# Patient Record
Sex: FEMALE | Race: ASIAN | NOT HISPANIC OR LATINO
[De-identification: names, ages, dates, MRNs, and addresses within clinical notes are randomized per-mention and may not be internally consistent; named-entity substitution may affect disease eponyms.]

---

## 2023-04-11 PROBLEM — Z00.00 ENCOUNTER FOR PREVENTIVE HEALTH EXAMINATION: Status: ACTIVE | Noted: 2023-04-11

## 2023-05-03 ENCOUNTER — APPOINTMENT (OUTPATIENT)
Dept: CARDIOTHORACIC SURGERY | Facility: CLINIC | Age: 73
End: 2023-05-03

## 2023-05-17 ENCOUNTER — RESULT REVIEW (OUTPATIENT)
Age: 73
End: 2023-05-17

## 2023-05-17 ENCOUNTER — NON-APPOINTMENT (OUTPATIENT)
Age: 73
End: 2023-05-17

## 2023-05-17 ENCOUNTER — APPOINTMENT (OUTPATIENT)
Dept: CT IMAGING | Facility: HOSPITAL | Age: 73
End: 2023-05-17
Payer: MEDICARE

## 2023-05-17 ENCOUNTER — OUTPATIENT (OUTPATIENT)
Dept: OUTPATIENT SERVICES | Facility: HOSPITAL | Age: 73
LOS: 1 days | End: 2023-05-17
Payer: MEDICARE

## 2023-05-17 ENCOUNTER — APPOINTMENT (OUTPATIENT)
Dept: CARDIOTHORACIC SURGERY | Facility: CLINIC | Age: 73
End: 2023-05-17
Payer: MEDICARE

## 2023-05-17 DIAGNOSIS — Z86.79 PERSONAL HISTORY OF OTHER DISEASES OF THE CIRCULATORY SYSTEM: ICD-10-CM

## 2023-05-17 DIAGNOSIS — Z87.09 PERSONAL HISTORY OF OTHER DISEASES OF THE RESPIRATORY SYSTEM: ICD-10-CM

## 2023-05-17 DIAGNOSIS — I06.9 RHEUMATIC AORTIC VALVE DISEASE, UNSPECIFIED: ICD-10-CM

## 2023-05-17 DIAGNOSIS — Z86.39 PERSONAL HISTORY OF OTHER ENDOCRINE, NUTRITIONAL AND METABOLIC DISEASE: ICD-10-CM

## 2023-05-17 DIAGNOSIS — I48.91 UNSPECIFIED ATRIAL FIBRILLATION: ICD-10-CM

## 2023-05-17 LAB — POCT ISTAT CREATININE: 0.5 MG/DL — SIGNIFICANT CHANGE UP (ref 0.5–1.3)

## 2023-05-17 PROCEDURE — 99215 OFFICE O/P EST HI 40 MIN: CPT

## 2023-05-17 PROCEDURE — 82565 ASSAY OF CREATININE: CPT

## 2023-05-17 PROCEDURE — 75572 CT HRT W/3D IMAGE: CPT | Mod: MH

## 2023-05-17 PROCEDURE — 75572 CT HRT W/3D IMAGE: CPT | Mod: 26,MH

## 2023-05-17 RX ORDER — CLOPIDOGREL BISULFATE 75 MG/1
75 TABLET, FILM COATED ORAL DAILY
Qty: 30 | Refills: 0 | Status: ACTIVE | COMMUNITY
Start: 2023-05-17 | End: 1900-01-01

## 2023-05-19 PROBLEM — Z86.79 HISTORY OF HYPERTENSION: Status: RESOLVED | Noted: 2023-05-19 | Resolved: 2023-05-19

## 2023-05-19 PROBLEM — Z86.79 HISTORY OF PERIPHERAL VASCULAR DISEASE: Status: RESOLVED | Noted: 2023-05-19 | Resolved: 2023-05-19

## 2023-05-19 PROBLEM — Z86.79 HISTORY OF CORONARY ARTERY DISEASE: Status: RESOLVED | Noted: 2023-05-19 | Resolved: 2023-05-19

## 2023-05-19 PROBLEM — I48.91 ATRIAL FIBRILLATION: Status: ACTIVE | Noted: 2023-05-17

## 2023-05-19 PROBLEM — Z87.09 HISTORY OF BRONCHIECTASIS: Status: RESOLVED | Noted: 2023-05-19 | Resolved: 2023-05-19

## 2023-05-19 PROBLEM — Z86.39 HISTORY OF HYPERLIPIDEMIA: Status: RESOLVED | Noted: 2023-05-19 | Resolved: 2023-05-19

## 2023-05-19 PROBLEM — Z86.79 HISTORY OF ATRIAL FIBRILLATION: Status: RESOLVED | Noted: 2023-05-19 | Resolved: 2023-05-19

## 2023-05-19 PROBLEM — I06.9 RHEUMATIC AORTIC DISEASE: Status: RESOLVED | Noted: 2023-05-19 | Resolved: 2023-05-19

## 2023-05-19 RX ORDER — ASPIRIN 81 MG
81 TABLET, DELAYED RELEASE (ENTERIC COATED) ORAL
Refills: 0 | Status: ACTIVE | COMMUNITY

## 2023-05-19 RX ORDER — PANTOPRAZOLE 40 MG/1
40 TABLET, DELAYED RELEASE ORAL
Refills: 0 | Status: ACTIVE | COMMUNITY

## 2023-05-19 RX ORDER — ATORVASTATIN CALCIUM 20 MG/1
20 TABLET, FILM COATED ORAL
Refills: 0 | Status: ACTIVE | COMMUNITY

## 2023-05-19 RX ORDER — CLINDAMYCIN HYDROCHLORIDE 300 MG/1
300 CAPSULE ORAL
Refills: 0 | Status: ACTIVE | COMMUNITY

## 2023-05-19 RX ORDER — QUETIAPINE 25 MG/1
25 TABLET, FILM COATED ORAL
Refills: 0 | Status: ACTIVE | COMMUNITY

## 2023-05-19 RX ORDER — AMLODIPINE BESYLATE 2.5 MG/1
2.5 TABLET ORAL
Refills: 0 | Status: ACTIVE | COMMUNITY

## 2023-05-19 RX ORDER — CLONAZEPAM 0.5 MG/1
0.5 TABLET ORAL
Refills: 0 | Status: ACTIVE | COMMUNITY

## 2023-05-19 RX ORDER — FLUTICASONE FUROATE AND VILANTEROL TRIFENATATE 100; 25 UG/1; UG/1
100-25 POWDER RESPIRATORY (INHALATION)
Refills: 0 | Status: ACTIVE | COMMUNITY

## 2023-05-19 RX ORDER — BUSPIRONE HYDROCHLORIDE 5 MG/1
5 TABLET ORAL
Refills: 0 | Status: ACTIVE | COMMUNITY

## 2023-05-19 NOTE — PHYSICAL EXAM
[Sclera] : the sclera and conjunctiva were normal [Neck Appearance] : the appearance of the neck was normal [Respiration, Rhythm And Depth] : normal respiratory rhythm and effort [Auscultation Breath Sounds / Voice Sounds] : lungs were clear to auscultation bilaterally [Apical Impulse] : the apical impulse was normal [Heart Sounds] : normal S1 and S2 [2+] : left 2+ [Abdomen Soft] : soft [Abdomen Tenderness] : non-tender [Abnormal Walk] : normal gait [Musculoskeletal - Swelling] : no joint swelling seen [Skin Color & Pigmentation] : normal skin color and pigmentation [] : no rash [Skin Lesions] : no skin lesions [Cranial Nerves] : cranial nerves 2-12 were intact [Deep Tendon Reflexes (DTR)] : deep tendon reflexes were 2+ and symmetric [Oriented To Time, Place, And Person] : oriented to person, place, and time [Fingers] :  capillary refill of the fingers was normal

## 2023-05-19 NOTE — HISTORY OF PRESENT ILLNESS
[FreeTextEntry1] : 73 y/o Female with PMHx of HTN, HLD, CAD s/p bioAVR/CABG with Dr. Mitul Ly (11/2018 SVG-RCA, Catalan Inspiris 21mm), hx of Endocarditis on lifelong penicillin, AFib (on Coumadin), AAA, PVD, presented with bronchiectasis worsening cough with hemoptysis on warfarin which has been stopped, referred by Dr. Mitul Gonzalez to Dr. Nubia Minor for pLAAo evaluation. \par \par Cantonese speaking,  was used. Patient underwent CT Heart LA protocol today. Reports no hemopytsis while coumadin is off. Denies CP, SOB, palpitations, orthopnea, LE edema.

## 2023-06-20 RX ORDER — CLOPIDOGREL BISULFATE 75 MG/1
75 TABLET, FILM COATED ORAL DAILY
Qty: 30 | Refills: 0 | Status: ACTIVE | COMMUNITY
Start: 2023-06-20 | End: 1900-01-01

## 2023-06-27 ENCOUNTER — NON-APPOINTMENT (OUTPATIENT)
Age: 73
End: 2023-06-27

## 2023-06-27 VITALS
HEART RATE: 80 BPM | DIASTOLIC BLOOD PRESSURE: 66 MMHG | TEMPERATURE: 97 F | RESPIRATION RATE: 16 BRPM | SYSTOLIC BLOOD PRESSURE: 144 MMHG | HEIGHT: 64.5 IN | OXYGEN SATURATION: 95 % | WEIGHT: 132.06 LBS

## 2023-06-27 NOTE — PATIENT PROFILE ADULT - FALL HARM RISK - HARM RISK INTERVENTIONS

## 2023-06-27 NOTE — PATIENT PROFILE ADULT - HEALTH LITERACY
Ideal Body Weight: 130 lbs; % Ideal [id number] no

## 2023-06-27 NOTE — PATIENT PROFILE ADULT - FALL HARM RISK - FALL HARM RISK
Other Winlevi Counseling:  I discussed with the patient the risks of topical clascoterone including but not limited to erythema, scaling, itching, and stinging. Patient voiced their understanding.

## 2023-06-27 NOTE — PATIENT PROFILE ADULT - PUBLIC BENEFITS
[facility] has tried to reach the patient to schedule 4 times, with no luck.      Please call to discuss.       no

## 2023-06-28 ENCOUNTER — TRANSCRIPTION ENCOUNTER (OUTPATIENT)
Age: 73
End: 2023-06-28

## 2023-06-28 ENCOUNTER — INPATIENT (INPATIENT)
Facility: HOSPITAL | Age: 73
LOS: 13 days | Discharge: HOME HEALTH | DRG: 273 | End: 2023-07-12
Attending: INTERNAL MEDICINE | Admitting: INTERNAL MEDICINE
Payer: MEDICARE

## 2023-06-28 DIAGNOSIS — Z41.9 ENCOUNTER FOR PROCEDURE FOR PURPOSES OTHER THAN REMEDYING HEALTH STATE, UNSPECIFIED: Chronic | ICD-10-CM

## 2023-06-28 LAB
ALBUMIN SERPL ELPH-MCNC: 3.3 G/DL — SIGNIFICANT CHANGE UP (ref 3.3–5)
ALBUMIN SERPL ELPH-MCNC: 4.1 G/DL — SIGNIFICANT CHANGE UP (ref 3.3–5)
ALP SERPL-CCNC: 106 U/L — SIGNIFICANT CHANGE UP (ref 40–120)
ALP SERPL-CCNC: 80 U/L — SIGNIFICANT CHANGE UP (ref 40–120)
ALT FLD-CCNC: 14 U/L — SIGNIFICANT CHANGE UP (ref 10–45)
ALT FLD-CCNC: 20 U/L — SIGNIFICANT CHANGE UP (ref 10–45)
ANION GAP SERPL CALC-SCNC: 6 MMOL/L — SIGNIFICANT CHANGE UP (ref 5–17)
ANION GAP SERPL CALC-SCNC: 9 MMOL/L — SIGNIFICANT CHANGE UP (ref 5–17)
ANION GAP SERPL CALC-SCNC: 9 MMOL/L — SIGNIFICANT CHANGE UP (ref 5–17)
APTT BLD: 33 SEC — SIGNIFICANT CHANGE UP (ref 27.5–35.5)
APTT BLD: 97.1 SEC — HIGH (ref 27.5–35.5)
APTT BLD: >200 SEC — CRITICAL HIGH (ref 27.5–35.5)
AST SERPL-CCNC: 16 U/L — SIGNIFICANT CHANGE UP (ref 10–40)
AST SERPL-CCNC: 28 U/L — SIGNIFICANT CHANGE UP (ref 10–40)
BASOPHILS # BLD AUTO: 0.04 K/UL — SIGNIFICANT CHANGE UP (ref 0–0.2)
BASOPHILS NFR BLD AUTO: 0.5 % — SIGNIFICANT CHANGE UP (ref 0–2)
BILIRUB SERPL-MCNC: 0.6 MG/DL — SIGNIFICANT CHANGE UP (ref 0.2–1.2)
BILIRUB SERPL-MCNC: 1 MG/DL — SIGNIFICANT CHANGE UP (ref 0.2–1.2)
BLD GP AB SCN SERPL QL: NEGATIVE — SIGNIFICANT CHANGE UP
BLD GP AB SCN SERPL QL: NEGATIVE — SIGNIFICANT CHANGE UP
BUN SERPL-MCNC: 11 MG/DL — SIGNIFICANT CHANGE UP (ref 7–23)
BUN SERPL-MCNC: 8 MG/DL — SIGNIFICANT CHANGE UP (ref 7–23)
BUN SERPL-MCNC: 8 MG/DL — SIGNIFICANT CHANGE UP (ref 7–23)
CALCIUM SERPL-MCNC: 8 MG/DL — LOW (ref 8.4–10.5)
CALCIUM SERPL-MCNC: 8.5 MG/DL — SIGNIFICANT CHANGE UP (ref 8.4–10.5)
CALCIUM SERPL-MCNC: 9.4 MG/DL — SIGNIFICANT CHANGE UP (ref 8.4–10.5)
CHLORIDE SERPL-SCNC: 102 MMOL/L — SIGNIFICANT CHANGE UP (ref 96–108)
CHLORIDE SERPL-SCNC: 110 MMOL/L — HIGH (ref 96–108)
CHLORIDE SERPL-SCNC: 112 MMOL/L — HIGH (ref 96–108)
CO2 SERPL-SCNC: 26 MMOL/L — SIGNIFICANT CHANGE UP (ref 22–31)
CO2 SERPL-SCNC: 28 MMOL/L — SIGNIFICANT CHANGE UP (ref 22–31)
CO2 SERPL-SCNC: 29 MMOL/L — SIGNIFICANT CHANGE UP (ref 22–31)
CREAT SERPL-MCNC: 0.42 MG/DL — LOW (ref 0.5–1.3)
CREAT SERPL-MCNC: 0.45 MG/DL — LOW (ref 0.5–1.3)
CREAT SERPL-MCNC: 0.53 MG/DL — SIGNIFICANT CHANGE UP (ref 0.5–1.3)
EGFR: 101 ML/MIN/1.73M2 — SIGNIFICANT CHANGE UP
EGFR: 103 ML/MIN/1.73M2 — SIGNIFICANT CHANGE UP
EGFR: 97 ML/MIN/1.73M2 — SIGNIFICANT CHANGE UP
EOSINOPHIL # BLD AUTO: 0.11 K/UL — SIGNIFICANT CHANGE UP (ref 0–0.5)
EOSINOPHIL NFR BLD AUTO: 1.5 % — SIGNIFICANT CHANGE UP (ref 0–6)
GAS PNL BLDA: SIGNIFICANT CHANGE UP
GLUCOSE SERPL-MCNC: 109 MG/DL — HIGH (ref 70–99)
GLUCOSE SERPL-MCNC: 129 MG/DL — HIGH (ref 70–99)
GLUCOSE SERPL-MCNC: 139 MG/DL — HIGH (ref 70–99)
HCT VFR BLD CALC: 36.1 % — SIGNIFICANT CHANGE UP (ref 34.5–45)
HCT VFR BLD CALC: 36.9 % — SIGNIFICANT CHANGE UP (ref 34.5–45)
HCT VFR BLD CALC: 43.7 % — SIGNIFICANT CHANGE UP (ref 34.5–45)
HGB BLD-MCNC: 11.8 G/DL — SIGNIFICANT CHANGE UP (ref 11.5–15.5)
HGB BLD-MCNC: 12.3 G/DL — SIGNIFICANT CHANGE UP (ref 11.5–15.5)
HGB BLD-MCNC: 14.4 G/DL — SIGNIFICANT CHANGE UP (ref 11.5–15.5)
IMM GRANULOCYTES NFR BLD AUTO: 0.3 % — SIGNIFICANT CHANGE UP (ref 0–0.9)
INR BLD: 1.11 — SIGNIFICANT CHANGE UP (ref 0.88–1.16)
INR BLD: 1.12 — SIGNIFICANT CHANGE UP (ref 0.88–1.16)
INR BLD: 1.2 — HIGH (ref 0.88–1.16)
LYMPHOCYTES # BLD AUTO: 2.18 K/UL — SIGNIFICANT CHANGE UP (ref 1–3.3)
LYMPHOCYTES # BLD AUTO: 28.8 % — SIGNIFICANT CHANGE UP (ref 13–44)
MAGNESIUM SERPL-MCNC: 1.7 MG/DL — SIGNIFICANT CHANGE UP (ref 1.6–2.6)
MAGNESIUM SERPL-MCNC: 2 MG/DL — SIGNIFICANT CHANGE UP (ref 1.6–2.6)
MCHC RBC-ENTMCNC: 29.9 PG — SIGNIFICANT CHANGE UP (ref 27–34)
MCHC RBC-ENTMCNC: 30 PG — SIGNIFICANT CHANGE UP (ref 27–34)
MCHC RBC-ENTMCNC: 30.1 PG — SIGNIFICANT CHANGE UP (ref 27–34)
MCHC RBC-ENTMCNC: 32.7 GM/DL — SIGNIFICANT CHANGE UP (ref 32–36)
MCHC RBC-ENTMCNC: 33 GM/DL — SIGNIFICANT CHANGE UP (ref 32–36)
MCHC RBC-ENTMCNC: 33.3 GM/DL — SIGNIFICANT CHANGE UP (ref 32–36)
MCV RBC AUTO: 89.6 FL — SIGNIFICANT CHANGE UP (ref 80–100)
MCV RBC AUTO: 91.4 FL — SIGNIFICANT CHANGE UP (ref 80–100)
MCV RBC AUTO: 91.9 FL — SIGNIFICANT CHANGE UP (ref 80–100)
MONOCYTES # BLD AUTO: 0.25 K/UL — SIGNIFICANT CHANGE UP (ref 0–0.9)
MONOCYTES NFR BLD AUTO: 3.3 % — SIGNIFICANT CHANGE UP (ref 2–14)
NEUTROPHILS # BLD AUTO: 4.96 K/UL — SIGNIFICANT CHANGE UP (ref 1.8–7.4)
NEUTROPHILS NFR BLD AUTO: 65.6 % — SIGNIFICANT CHANGE UP (ref 43–77)
NRBC # BLD: 0 /100 WBCS — SIGNIFICANT CHANGE UP (ref 0–0)
NT-PROBNP SERPL-SCNC: 544 PG/ML — HIGH (ref 0–300)
PHOSPHATE SERPL-MCNC: 3.9 MG/DL — SIGNIFICANT CHANGE UP (ref 2.5–4.5)
PLATELET # BLD AUTO: 138 K/UL — LOW (ref 150–400)
PLATELET # BLD AUTO: 171 K/UL — SIGNIFICANT CHANGE UP (ref 150–400)
PLATELET # BLD AUTO: 187 K/UL — SIGNIFICANT CHANGE UP (ref 150–400)
POTASSIUM SERPL-MCNC: 3.1 MMOL/L — LOW (ref 3.5–5.3)
POTASSIUM SERPL-MCNC: 3.4 MMOL/L — LOW (ref 3.5–5.3)
POTASSIUM SERPL-MCNC: 4 MMOL/L — SIGNIFICANT CHANGE UP (ref 3.5–5.3)
POTASSIUM SERPL-SCNC: 3.1 MMOL/L — LOW (ref 3.5–5.3)
POTASSIUM SERPL-SCNC: 3.4 MMOL/L — LOW (ref 3.5–5.3)
POTASSIUM SERPL-SCNC: 4 MMOL/L — SIGNIFICANT CHANGE UP (ref 3.5–5.3)
PROT SERPL-MCNC: 6.1 G/DL — SIGNIFICANT CHANGE UP (ref 6–8.3)
PROT SERPL-MCNC: 8.1 G/DL — SIGNIFICANT CHANGE UP (ref 6–8.3)
PROTHROM AB SERPL-ACNC: 13.2 SEC — SIGNIFICANT CHANGE UP (ref 10.5–13.4)
PROTHROM AB SERPL-ACNC: 13.4 SEC — SIGNIFICANT CHANGE UP (ref 10.5–13.4)
PROTHROM AB SERPL-ACNC: 14.3 SEC — HIGH (ref 10.5–13.4)
RBC # BLD: 3.93 M/UL — SIGNIFICANT CHANGE UP (ref 3.8–5.2)
RBC # BLD: 4.12 M/UL — SIGNIFICANT CHANGE UP (ref 3.8–5.2)
RBC # BLD: 4.78 M/UL — SIGNIFICANT CHANGE UP (ref 3.8–5.2)
RBC # FLD: 12.5 % — SIGNIFICANT CHANGE UP (ref 10.3–14.5)
RBC # FLD: 12.6 % — SIGNIFICANT CHANGE UP (ref 10.3–14.5)
RBC # FLD: 12.7 % — SIGNIFICANT CHANGE UP (ref 10.3–14.5)
RH IG SCN BLD-IMP: POSITIVE — SIGNIFICANT CHANGE UP
RH IG SCN BLD-IMP: POSITIVE — SIGNIFICANT CHANGE UP
SODIUM SERPL-SCNC: 140 MMOL/L — SIGNIFICANT CHANGE UP (ref 135–145)
SODIUM SERPL-SCNC: 144 MMOL/L — SIGNIFICANT CHANGE UP (ref 135–145)
SODIUM SERPL-SCNC: 147 MMOL/L — HIGH (ref 135–145)
WBC # BLD: 7.24 K/UL — SIGNIFICANT CHANGE UP (ref 3.8–10.5)
WBC # BLD: 7.56 K/UL — SIGNIFICANT CHANGE UP (ref 3.8–10.5)
WBC # BLD: 7.99 K/UL — SIGNIFICANT CHANGE UP (ref 3.8–10.5)
WBC # FLD AUTO: 7.24 K/UL — SIGNIFICANT CHANGE UP (ref 3.8–10.5)
WBC # FLD AUTO: 7.56 K/UL — SIGNIFICANT CHANGE UP (ref 3.8–10.5)
WBC # FLD AUTO: 7.99 K/UL — SIGNIFICANT CHANGE UP (ref 3.8–10.5)

## 2023-06-28 PROCEDURE — 33340 PERQ CLSR TCAT L ATR APNDGE: CPT | Mod: Q0

## 2023-06-28 PROCEDURE — 93010 ELECTROCARDIOGRAM REPORT: CPT

## 2023-06-28 PROCEDURE — 71045 X-RAY EXAM CHEST 1 VIEW: CPT | Mod: 26

## 2023-06-28 PROCEDURE — 99232 SBSQ HOSP IP/OBS MODERATE 35: CPT

## 2023-06-28 PROCEDURE — 93306 TTE W/DOPPLER COMPLETE: CPT | Mod: 26

## 2023-06-28 DEVICE — MICRO-INTRO 5F 0.018X40CM NITINOL SS TIP 7CM ECHOGENIC: Type: IMPLANTABLE DEVICE | Status: FUNCTIONAL

## 2023-06-28 DEVICE — SHEATH INTRODUCER TERUMO PINNACLE CORONARY 11FR X 10CM X 0.038" MINI WIRE: Type: IMPLANTABLE DEVICE | Status: FUNCTIONAL

## 2023-06-28 DEVICE — SYS ACCESS FXD WATCHMAN DBL: Type: IMPLANTABLE DEVICE | Status: FUNCTIONAL

## 2023-06-28 DEVICE — SYS VASCADE MVP VASCULAR CLOSURE 6-12F: Type: IMPLANTABLE DEVICE | Status: FUNCTIONAL

## 2023-06-28 DEVICE — SHEATH INTRODUCER TERUMO PINNACLE CORONARY 8FR X 10CM X 0.038" MINI WIRE: Type: IMPLANTABLE DEVICE | Status: FUNCTIONAL

## 2023-06-28 DEVICE — GWIRE GUID  0.035INX150CM: Type: IMPLANTABLE DEVICE | Status: FUNCTIONAL

## 2023-06-28 DEVICE — KIT VERSACROSS CONNECT LAAC ACCESS 230-P RF WIRE 85CM: Type: IMPLANTABLE DEVICE | Status: FUNCTIONAL

## 2023-06-28 DEVICE — CATH ANGIO SUP TRQ PLUS 5.2FRX100CM: Type: IMPLANTABLE DEVICE | Status: FUNCTIONAL

## 2023-06-28 DEVICE — IMPLANTABLE DEVICE: Type: IMPLANTABLE DEVICE | Status: FUNCTIONAL

## 2023-06-28 RX ORDER — CLONAZEPAM 1 MG
0.5 TABLET ORAL ONCE
Refills: 0 | Status: DISCONTINUED | OUTPATIENT
Start: 2023-06-28 | End: 2023-06-28

## 2023-06-28 RX ORDER — MEPERIDINE HYDROCHLORIDE 50 MG/ML
25 INJECTION INTRAMUSCULAR; INTRAVENOUS; SUBCUTANEOUS ONCE
Refills: 0 | Status: DISCONTINUED | OUTPATIENT
Start: 2023-06-28 | End: 2023-06-28

## 2023-06-28 RX ORDER — POTASSIUM CHLORIDE 20 MEQ
40 PACKET (EA) ORAL ONCE
Refills: 0 | Status: COMPLETED | OUTPATIENT
Start: 2023-06-28 | End: 2023-06-28

## 2023-06-28 RX ORDER — MAGNESIUM OXIDE 400 MG ORAL TABLET 241.3 MG
400 TABLET ORAL ONCE
Refills: 0 | Status: COMPLETED | OUTPATIENT
Start: 2023-06-28 | End: 2023-06-28

## 2023-06-28 RX ORDER — ALBUTEROL 90 UG/1
2 AEROSOL, METERED ORAL EVERY 6 HOURS
Refills: 0 | Status: DISCONTINUED | OUTPATIENT
Start: 2023-06-28 | End: 2023-06-29

## 2023-06-28 RX ORDER — CHLORHEXIDINE GLUCONATE 213 G/1000ML
5 SOLUTION TOPICAL
Refills: 0 | Status: DISCONTINUED | OUTPATIENT
Start: 2023-06-28 | End: 2023-07-06

## 2023-06-28 RX ORDER — PHENYLEPHRINE HYDROCHLORIDE 10 MG/ML
0.2 INJECTION INTRAVENOUS
Qty: 40 | Refills: 0 | Status: DISCONTINUED | OUTPATIENT
Start: 2023-06-28 | End: 2023-06-29

## 2023-06-28 RX ORDER — ASPIRIN/CALCIUM CARB/MAGNESIUM 324 MG
81 TABLET ORAL DAILY
Refills: 0 | Status: DISCONTINUED | OUTPATIENT
Start: 2023-06-29 | End: 2023-06-29

## 2023-06-28 RX ORDER — BUDESONIDE AND FORMOTEROL FUMARATE DIHYDRATE 160; 4.5 UG/1; UG/1
2 AEROSOL RESPIRATORY (INHALATION)
Refills: 0 | Status: DISCONTINUED | OUTPATIENT
Start: 2023-06-28 | End: 2023-07-12

## 2023-06-28 RX ORDER — WARFARIN SODIUM 2.5 MG/1
1 TABLET ORAL
Refills: 0 | DISCHARGE

## 2023-06-28 RX ORDER — POTASSIUM CHLORIDE 20 MEQ
10 PACKET (EA) ORAL ONCE
Refills: 0 | Status: COMPLETED | OUTPATIENT
Start: 2023-06-28 | End: 2023-06-28

## 2023-06-28 RX ORDER — FLUTICASONE FUROATE AND VILANTEROL TRIFENATATE 100; 25 UG/1; UG/1
0 POWDER RESPIRATORY (INHALATION)
Refills: 0 | DISCHARGE

## 2023-06-28 RX ORDER — SODIUM CHLORIDE 9 MG/ML
1000 INJECTION INTRAMUSCULAR; INTRAVENOUS; SUBCUTANEOUS
Refills: 0 | Status: DISCONTINUED | OUTPATIENT
Start: 2023-06-28 | End: 2023-06-30

## 2023-06-28 RX ORDER — PANTOPRAZOLE SODIUM 20 MG/1
40 TABLET, DELAYED RELEASE ORAL ONCE
Refills: 0 | Status: COMPLETED | OUTPATIENT
Start: 2023-06-28 | End: 2023-06-29

## 2023-06-28 RX ORDER — QUETIAPINE FUMARATE 200 MG/1
25 TABLET, FILM COATED ORAL DAILY
Refills: 0 | Status: DISCONTINUED | OUTPATIENT
Start: 2023-06-28 | End: 2023-07-12

## 2023-06-28 RX ORDER — CLOPIDOGREL BISULFATE 75 MG/1
75 TABLET, FILM COATED ORAL DAILY
Refills: 0 | Status: DISCONTINUED | OUTPATIENT
Start: 2023-06-29 | End: 2023-06-29

## 2023-06-28 RX ORDER — ALBUTEROL 90 UG/1
2 AEROSOL, METERED ORAL
Refills: 0 | DISCHARGE

## 2023-06-28 RX ORDER — CEFAZOLIN SODIUM 1 G
2000 VIAL (EA) INJECTION EVERY 8 HOURS
Refills: 0 | Status: COMPLETED | OUTPATIENT
Start: 2023-06-28 | End: 2023-06-30

## 2023-06-28 RX ORDER — ATORVASTATIN CALCIUM 80 MG/1
20 TABLET, FILM COATED ORAL AT BEDTIME
Refills: 0 | Status: DISCONTINUED | OUTPATIENT
Start: 2023-06-28 | End: 2023-07-12

## 2023-06-28 RX ORDER — POLYETHYLENE GLYCOL 3350 17 G/17G
17 POWDER, FOR SOLUTION ORAL DAILY
Refills: 0 | Status: DISCONTINUED | OUTPATIENT
Start: 2023-06-28 | End: 2023-07-12

## 2023-06-28 RX ORDER — HEPARIN SODIUM 5000 [USP'U]/ML
5000 INJECTION INTRAVENOUS; SUBCUTANEOUS EVERY 8 HOURS
Refills: 0 | Status: DISCONTINUED | OUTPATIENT
Start: 2023-06-28 | End: 2023-06-29

## 2023-06-28 RX ADMIN — Medication 0.5 MILLIGRAM(S): at 18:29

## 2023-06-28 RX ADMIN — POLYETHYLENE GLYCOL 3350 17 GRAM(S): 17 POWDER, FOR SOLUTION ORAL at 12:19

## 2023-06-28 RX ADMIN — QUETIAPINE FUMARATE 25 MILLIGRAM(S): 200 TABLET, FILM COATED ORAL at 12:19

## 2023-06-28 RX ADMIN — Medication 100 MILLIGRAM(S): at 16:35

## 2023-06-28 RX ADMIN — MAGNESIUM OXIDE 400 MG ORAL TABLET 400 MILLIGRAM(S): 241.3 TABLET ORAL at 12:19

## 2023-06-28 RX ADMIN — Medication 7.5 MILLIGRAM(S): at 21:52

## 2023-06-28 RX ADMIN — HEPARIN SODIUM 5000 UNIT(S): 5000 INJECTION INTRAVENOUS; SUBCUTANEOUS at 21:53

## 2023-06-28 RX ADMIN — Medication 100 MILLIEQUIVALENT(S): at 11:40

## 2023-06-28 RX ADMIN — ATORVASTATIN CALCIUM 20 MILLIGRAM(S): 80 TABLET, FILM COATED ORAL at 21:52

## 2023-06-28 RX ADMIN — Medication 40 MILLIEQUIVALENT(S): at 12:18

## 2023-06-28 RX ADMIN — Medication 40 MILLIEQUIVALENT(S): at 17:58

## 2023-06-28 RX ADMIN — PHENYLEPHRINE HYDROCHLORIDE 4.49 MICROGRAM(S)/KG/MIN: 10 INJECTION INTRAVENOUS at 19:21

## 2023-06-28 NOTE — PROGRESS NOTE ADULT - SUBJECTIVE AND OBJECTIVE BOX
Patient discussed on morning rounds with Dr. Cervantes    OPERATION & DATE: 6/28 watchman device    SUBJECTIVE ASSESSMENT: resting comfortably in bed. Pt with repeated episodes of hemoptysis, this is consistant with hempoptysis that she was having while on AC. No other pain or discomfort at this time.     VITAL SIGNS:  Vital Signs Last 24 Hrs  T(C): 36 (28 Jun 2023 16:59), Max: 36.5 (28 Jun 2023 13:40)  T(F): 96.8 (28 Jun 2023 16:59), Max: 97.7 (28 Jun 2023 13:40)  HR: 67 (28 Jun 2023 16:00) (50 - 81)  BP: 144/66 (28 Jun 2023 07:13) (144/66 - 144/66)  BP(mean): --  RR: 15 (28 Jun 2023 16:00) (14 - 16)  SpO2: 94% (28 Jun 2023 16:00) (91% - 99%)    Parameters below as of 28 Jun 2023 16:00  Patient On (Oxygen Delivery Method): room air      I&O's Detail    28 Jun 2023 07:01  -  28 Jun 2023 17:00  --------------------------------------------------------  IN:    IV PiggyBack: 150 mL    sodium chloride 0.9%: 20 mL  Total IN: 170 mL    OUT:    Intermittent Catheterization - Urethral (mL): 800 mL    Voided (mL): 900 mL  Total OUT: 1700 mL    Total NET: -1530 mL        CHEST TUBE:  none  LOUISE DRAIN:  none  EPICARDIAL WIRES: none  STITCHES: none  STAPLES: none  OBREGON: none  CENTRAL LINE: none  MIDLINE/PICC: none  WOUND VAC: none    PHYSICAL EXAM:  General: resting comfortably in bed in NAD  Neurological: AOx3. Motor skills grossly intact  Cardiovascular: Normal S1/S2. Regular rate/rhythm. No murmurs  Respiratory: Lungs CTA bilaterally. No wheezing or rales  Gastrointestinal: +BS in all 4 quadrants. Non-distended. Soft. Non-tender  Extremities: Strength 5/5 b/l upper/lower extremities. Sensation grossly intact upper/lower extremities. No edema. No calf tenderness.  Vascular: Radial 2+bilaterally, DP 2+ b/l  Incision Sites: NA  Groins: soft bilaterally, small amount of oozing from R groin    LABS:                        11.8   7.56  )-----------( 138      ( 28 Jun 2023 10:10 )             36.1     PT/INR - ( 28 Jun 2023 12:43 )   PT: 13.4 sec;   INR: 1.12          PTT - ( 28 Jun 2023 12:43 )  PTT:97.1 sec  06-28    144  |  110<H>  |  8   ----------------------------<  139<H>  3.1<L>   |  28  |  0.45<L>    Ca    8.0<L>      28 Jun 2023 10:10  Phos  3.9     06-28  Mg     1.7     06-28    TPro  6.1  /  Alb  3.3  /  TBili  0.6  /  DBili  x   /  AST  16  /  ALT  14  /  AlkPhos  80  06-28    Urinalysis Basic - ( 28 Jun 2023 10:10 )    Color: x / Appearance: x / SG: x / pH: x  Gluc: 139 mg/dL / Ketone: x  / Bili: x / Urobili: x   Blood: x / Protein: x / Nitrite: x   Leuk Esterase: x / RBC: x / WBC x   Sq Epi: x / Non Sq Epi: x / Bacteria: x      MEDICATIONS  (STANDING):  atorvastatin 20 milliGRAM(s) Oral at bedtime  budesonide  80 MICROgram(s)/formoterol 4.5 MICROgram(s) Inhaler 2 Puff(s) Inhalation two times a day  busPIRone 7.5 milliGRAM(s) Oral <User Schedule>  ceFAZolin   IVPB 2000 milliGRAM(s) IV Intermittent every 8 hours  chlorhexidine 0.12% Liquid 5 milliLiter(s) Oral Mucosa two times a day  heparin   Injectable 5000 Unit(s) SubCutaneous every 8 hours  pantoprazole    Tablet 40 milliGRAM(s) Oral once  polyethylene glycol 3350 17 Gram(s) Oral daily  QUEtiapine 25 milliGRAM(s) Oral daily  sodium chloride 0.9%. 1000 milliLiter(s) (10 mL/Hr) IV Continuous <Continuous>    MEDICATIONS  (PRN):  albuterol    90 MICROgram(s) HFA Inhaler 2 Puff(s) Inhalation every 6 hours PRN Shortness of Breath and/or Wheezing    RADIOLOGY & ADDITIONAL TESTS:

## 2023-06-28 NOTE — H&P ADULT - NSHPPHYSICALEXAM_GEN_ALL_CORE
Physical Exam  CONSTITUTIONAL:      Sitting comfortably in bed, NAD  NEURO:  AAOx3, no neuro deficits, CN grossly intact                   EYES:    WNL  ENMT:           WNL  CV:    S1S2, RRR, no mursmurs appreciated   RESPIRATORY:   CTA b/l, no w/r/r  GI: +BS, soft, nd/nd  : No smith, deferred  MUSKULOSKELETAL:   WWP, no edema, no calf tenderness, palpable peripheral pulses b/l   SKIN / BREAST:        WNL

## 2023-06-28 NOTE — H&P ADULT - NSICDXPASTMEDICALHX_GEN_ALL_CORE_FT
PAST MEDICAL HISTORY:  Atrial fibrillation     CAD (coronary artery disease)     History of bronchiectasis     History of endocarditis     History of hemoptysis     History of peripheral vascular disease     HTN (hypertension)     Hyperlipidemia     Rheumatic aortic disease

## 2023-06-28 NOTE — H&P ADULT - ASSESSMENT
73 y/o Female with PMHx of HTN, HLD, CAD s/p bioAVR/CABG with Dr. Mitul Ly (11/2018 SVG-RCA, Catalan Inspiris 21mm), hx of Endocarditis on lifelong penicillin, AFib (on Coumadin), AAA, PVD, presented with bronchiectasis worsening cough with hemoptysis on warfarin which has been stopped, referred by Dr. Mitul Gonzalez to Dr. Nubia Minor for pLAAo evaluation.     Admit under Dr. Minor  via same day surgery. Consent signed, placed on chart.  Risks/benefits reviewed, patient understands and agrees. T&S ordered and blood products placed on hold for OR.  To ICU   post-op.

## 2023-06-28 NOTE — BRIEF OPERATIVE NOTE - COMMENTS
Dr. Guardado was the first assistant for this case including but not limited to  vascular access, valve deployment, and closure device.     I was present for this procedure and participated as first assistant as described by the PA above, unless otherwise noted below

## 2023-06-28 NOTE — H&P ADULT - HISTORY OF PRESENT ILLNESS
71 y/o Female with PMHx of HTN, HLD, CAD s/p bioAVR/CABG with Dr. Mitul Ly (11/2018 SVG-RCA, Catalan Inspiris 21mm), hx of Endocarditis on lifelong penicillin, AFib, AAA, PVD, presented with bronchiectasis worsening cough with hemoptysis on warfarin which has been stopped, referred by Dr. Mitul Gonzalez to Dr. Nubia Minor for pLAAo evaluation. Cantonese speaking,  was used. Patient underwent CT Heart LA protocol today. Reports no hemopytsis while coumadin is off. Denies CP, SOB, palpitations, orthopnea, LE edema. Patient seen in same day holding area; Reports no changes to PMHx or medications since last seen by our team. Denies acute or current SOB, chest pain, palpitation, N/V/D, fever/chills, recent illness, or any other concerning symptoms.

## 2023-06-28 NOTE — BRIEF OPERATIVE NOTE - NSICDXBRIEFPROCEDURE_GEN_ALL_CORE_FT
PROCEDURES:  Occlusion, left atrial appendage 28-Jun-2023 08:43:26 Left atrial appendage occlusion with watchmen device Scottie Flores

## 2023-06-28 NOTE — BRIEF OPERATIVE NOTE - OPERATION/FINDINGS
Left atrial appendage occlusion with watchmen device Left atrial appendage occlusion with watchmen device    RCFV: 8 fr vascade

## 2023-06-28 NOTE — PRE-ANESTHESIA EVALUATION ADULT - BSA (M2)
PMD-None. Just moved from [place]. Pt c/o MS flare-up and cough for several weeks. States that she has an appt with a Neurologist on April 26. 1.65

## 2023-06-29 LAB
ALBUMIN SERPL ELPH-MCNC: 3.7 G/DL — SIGNIFICANT CHANGE UP (ref 3.3–5)
ALP SERPL-CCNC: 93 U/L — SIGNIFICANT CHANGE UP (ref 40–120)
ALT FLD-CCNC: 18 U/L — SIGNIFICANT CHANGE UP (ref 10–45)
ANION GAP SERPL CALC-SCNC: 11 MMOL/L — SIGNIFICANT CHANGE UP (ref 5–17)
APTT BLD: 35.5 SEC — SIGNIFICANT CHANGE UP (ref 27.5–35.5)
AST SERPL-CCNC: 29 U/L — SIGNIFICANT CHANGE UP (ref 10–40)
BASOPHILS # BLD AUTO: 0.03 K/UL — SIGNIFICANT CHANGE UP (ref 0–0.2)
BASOPHILS NFR BLD AUTO: 0.2 % — SIGNIFICANT CHANGE UP (ref 0–2)
BILIRUB SERPL-MCNC: 1.2 MG/DL — SIGNIFICANT CHANGE UP (ref 0.2–1.2)
BUN SERPL-MCNC: 8 MG/DL — SIGNIFICANT CHANGE UP (ref 7–23)
CALCIUM SERPL-MCNC: 8.2 MG/DL — LOW (ref 8.4–10.5)
CHLORIDE SERPL-SCNC: 104 MMOL/L — SIGNIFICANT CHANGE UP (ref 96–108)
CO2 SERPL-SCNC: 24 MMOL/L — SIGNIFICANT CHANGE UP (ref 22–31)
CREAT SERPL-MCNC: 0.48 MG/DL — LOW (ref 0.5–1.3)
EGFR: 99 ML/MIN/1.73M2 — SIGNIFICANT CHANGE UP
EOSINOPHIL # BLD AUTO: 0.02 K/UL — SIGNIFICANT CHANGE UP (ref 0–0.5)
EOSINOPHIL NFR BLD AUTO: 0.2 % — SIGNIFICANT CHANGE UP (ref 0–6)
GAS PNL BLDA: SIGNIFICANT CHANGE UP
GLUCOSE SERPL-MCNC: 121 MG/DL — HIGH (ref 70–99)
HCT VFR BLD CALC: 36.5 % — SIGNIFICANT CHANGE UP (ref 34.5–45)
HGB BLD-MCNC: 12.2 G/DL — SIGNIFICANT CHANGE UP (ref 11.5–15.5)
IMM GRANULOCYTES NFR BLD AUTO: 0.4 % — SIGNIFICANT CHANGE UP (ref 0–0.9)
INR BLD: 1.14 — SIGNIFICANT CHANGE UP (ref 0.88–1.16)
LYMPHOCYTES # BLD AUTO: 1.53 K/UL — SIGNIFICANT CHANGE UP (ref 1–3.3)
LYMPHOCYTES # BLD AUTO: 12.1 % — LOW (ref 13–44)
MAGNESIUM SERPL-MCNC: 2 MG/DL — SIGNIFICANT CHANGE UP (ref 1.6–2.6)
MCHC RBC-ENTMCNC: 30.2 PG — SIGNIFICANT CHANGE UP (ref 27–34)
MCHC RBC-ENTMCNC: 33.4 GM/DL — SIGNIFICANT CHANGE UP (ref 32–36)
MCV RBC AUTO: 90.3 FL — SIGNIFICANT CHANGE UP (ref 80–100)
MONOCYTES # BLD AUTO: 0.48 K/UL — SIGNIFICANT CHANGE UP (ref 0–0.9)
MONOCYTES NFR BLD AUTO: 3.8 % — SIGNIFICANT CHANGE UP (ref 2–14)
NEUTROPHILS # BLD AUTO: 10.56 K/UL — HIGH (ref 1.8–7.4)
NEUTROPHILS NFR BLD AUTO: 83.3 % — HIGH (ref 43–77)
NRBC # BLD: 0 /100 WBCS — SIGNIFICANT CHANGE UP (ref 0–0)
PLATELET # BLD AUTO: 167 K/UL — SIGNIFICANT CHANGE UP (ref 150–400)
POTASSIUM SERPL-MCNC: 3.7 MMOL/L — SIGNIFICANT CHANGE UP (ref 3.5–5.3)
POTASSIUM SERPL-SCNC: 3.7 MMOL/L — SIGNIFICANT CHANGE UP (ref 3.5–5.3)
PROT SERPL-MCNC: 7.4 G/DL — SIGNIFICANT CHANGE UP (ref 6–8.3)
PROTHROM AB SERPL-ACNC: 13.6 SEC — HIGH (ref 10.5–13.4)
RBC # BLD: 4.04 M/UL — SIGNIFICANT CHANGE UP (ref 3.8–5.2)
RBC # FLD: 12.7 % — SIGNIFICANT CHANGE UP (ref 10.3–14.5)
SODIUM SERPL-SCNC: 139 MMOL/L — SIGNIFICANT CHANGE UP (ref 135–145)
WBC # BLD: 12.67 K/UL — HIGH (ref 3.8–10.5)
WBC # FLD AUTO: 12.67 K/UL — HIGH (ref 3.8–10.5)

## 2023-06-29 PROCEDURE — 31624 DX BRONCHOSCOPE/LAVAGE: CPT | Mod: GC

## 2023-06-29 PROCEDURE — 93306 TTE W/DOPPLER COMPLETE: CPT | Mod: 26

## 2023-06-29 PROCEDURE — 99223 1ST HOSP IP/OBS HIGH 75: CPT | Mod: GC,25

## 2023-06-29 PROCEDURE — 71275 CT ANGIOGRAPHY CHEST: CPT | Mod: 26

## 2023-06-29 PROCEDURE — 71045 X-RAY EXAM CHEST 1 VIEW: CPT | Mod: 26,77

## 2023-06-29 PROCEDURE — 93355 ECHO TRANSESOPHAGEAL (TEE): CPT | Mod: 26

## 2023-06-29 PROCEDURE — 99291 CRITICAL CARE FIRST HOUR: CPT

## 2023-06-29 PROCEDURE — 99232 SBSQ HOSP IP/OBS MODERATE 35: CPT

## 2023-06-29 PROCEDURE — 71045 X-RAY EXAM CHEST 1 VIEW: CPT | Mod: 26

## 2023-06-29 RX ORDER — PROPOFOL 10 MG/ML
10 INJECTION, EMULSION INTRAVENOUS
Qty: 1000 | Refills: 0 | Status: DISCONTINUED | OUTPATIENT
Start: 2023-06-29 | End: 2023-06-29

## 2023-06-29 RX ORDER — POTASSIUM CHLORIDE 20 MEQ
40 PACKET (EA) ORAL ONCE
Refills: 0 | Status: COMPLETED | OUTPATIENT
Start: 2023-06-29 | End: 2023-06-29

## 2023-06-29 RX ORDER — FENTANYL CITRATE 50 UG/ML
100 INJECTION INTRAVENOUS ONCE
Refills: 0 | Status: DISCONTINUED | OUTPATIENT
Start: 2023-06-29 | End: 2023-06-29

## 2023-06-29 RX ORDER — ASPIRIN/CALCIUM CARB/MAGNESIUM 324 MG
81 TABLET ORAL EVERY 24 HOURS
Refills: 0 | Status: DISCONTINUED | OUTPATIENT
Start: 2023-06-30 | End: 2023-06-30

## 2023-06-29 RX ORDER — CHLORHEXIDINE GLUCONATE 213 G/1000ML
1 SOLUTION TOPICAL
Refills: 0 | Status: DISCONTINUED | OUTPATIENT
Start: 2023-06-29 | End: 2023-07-06

## 2023-06-29 RX ORDER — PANTOPRAZOLE SODIUM 20 MG/1
40 TABLET, DELAYED RELEASE ORAL
Refills: 0 | Status: DISCONTINUED | OUTPATIENT
Start: 2023-06-29 | End: 2023-07-03

## 2023-06-29 RX ORDER — PROPOFOL 10 MG/ML
9.99 INJECTION, EMULSION INTRAVENOUS
Qty: 1000 | Refills: 0 | Status: DISCONTINUED | OUTPATIENT
Start: 2023-06-29 | End: 2023-07-01

## 2023-06-29 RX ORDER — FENTANYL CITRATE 50 UG/ML
50 INJECTION INTRAVENOUS ONCE
Refills: 0 | Status: DISCONTINUED | OUTPATIENT
Start: 2023-06-29 | End: 2023-06-29

## 2023-06-29 RX ORDER — FENTANYL CITRATE 50 UG/ML
0.5 INJECTION INTRAVENOUS
Qty: 2500 | Refills: 0 | Status: DISCONTINUED | OUTPATIENT
Start: 2023-06-29 | End: 2023-06-30

## 2023-06-29 RX ORDER — CLOPIDOGREL BISULFATE 75 MG/1
75 TABLET, FILM COATED ORAL EVERY 24 HOURS
Refills: 0 | Status: DISCONTINUED | OUTPATIENT
Start: 2023-06-30 | End: 2023-06-30

## 2023-06-29 RX ORDER — ETOMIDATE 2 MG/ML
20 INJECTION INTRAVENOUS ONCE
Refills: 0 | Status: COMPLETED | OUTPATIENT
Start: 2023-06-29 | End: 2023-06-29

## 2023-06-29 RX ORDER — SUCCINYLCHOLINE CHLORIDE 100 MG/5ML
90 SYRINGE (ML) INTRAVENOUS ONCE
Refills: 0 | Status: COMPLETED | OUTPATIENT
Start: 2023-06-29 | End: 2023-06-29

## 2023-06-29 RX ORDER — CISATRACURIUM BESYLATE 2 MG/ML
3 INJECTION INTRAVENOUS
Qty: 200 | Refills: 0 | Status: DISCONTINUED | OUTPATIENT
Start: 2023-06-29 | End: 2023-07-02

## 2023-06-29 RX ORDER — TRANEXAMIC ACID 100 MG/ML
500 INJECTION, SOLUTION INTRAVENOUS EVERY 8 HOURS
Refills: 0 | Status: DISCONTINUED | OUTPATIENT
Start: 2023-06-29 | End: 2023-07-02

## 2023-06-29 RX ORDER — LIDOCAINE HYDROCHLORIDE AND EPINEPHRINE 10; 10 MG/ML; UG/ML
20 INJECTION, SOLUTION INFILTRATION; PERINEURAL ONCE
Refills: 0 | Status: COMPLETED | OUTPATIENT
Start: 2023-06-29 | End: 2023-06-29

## 2023-06-29 RX ORDER — NOREPINEPHRINE BITARTRATE/D5W 8 MG/250ML
0.05 PLASTIC BAG, INJECTION (ML) INTRAVENOUS
Qty: 8 | Refills: 0 | Status: DISCONTINUED | OUTPATIENT
Start: 2023-06-29 | End: 2023-07-02

## 2023-06-29 RX ORDER — CISATRACURIUM BESYLATE 2 MG/ML
20 INJECTION INTRAVENOUS ONCE
Refills: 0 | Status: COMPLETED | OUTPATIENT
Start: 2023-06-29 | End: 2023-06-29

## 2023-06-29 RX ORDER — MIDAZOLAM HYDROCHLORIDE 1 MG/ML
8 INJECTION, SOLUTION INTRAMUSCULAR; INTRAVENOUS ONCE
Refills: 0 | Status: DISCONTINUED | OUTPATIENT
Start: 2023-06-29 | End: 2023-06-29

## 2023-06-29 RX ORDER — IPRATROPIUM/ALBUTEROL SULFATE 18-103MCG
3 AEROSOL WITH ADAPTER (GRAM) INHALATION EVERY 6 HOURS
Refills: 0 | Status: DISCONTINUED | OUTPATIENT
Start: 2023-06-29 | End: 2023-07-12

## 2023-06-29 RX ADMIN — FENTANYL CITRATE 50 MICROGRAM(S): 50 INJECTION INTRAVENOUS at 18:30

## 2023-06-29 RX ADMIN — PROPOFOL 3.59 MICROGRAM(S)/KG/MIN: 10 INJECTION, EMULSION INTRAVENOUS at 18:20

## 2023-06-29 RX ADMIN — FENTANYL CITRATE 50 MICROGRAM(S): 50 INJECTION INTRAVENOUS at 18:40

## 2023-06-29 RX ADMIN — PANTOPRAZOLE SODIUM 40 MILLIGRAM(S): 20 TABLET, DELAYED RELEASE ORAL at 05:56

## 2023-06-29 RX ADMIN — Medication 100 MILLIGRAM(S): at 10:35

## 2023-06-29 RX ADMIN — FENTANYL CITRATE 50 MICROGRAM(S): 50 INJECTION INTRAVENOUS at 17:58

## 2023-06-29 RX ADMIN — Medication 100 MILLIGRAM(S): at 19:30

## 2023-06-29 RX ADMIN — FENTANYL CITRATE 50 MICROGRAM(S): 50 INJECTION INTRAVENOUS at 18:10

## 2023-06-29 RX ADMIN — ETOMIDATE 20 MILLIGRAM(S): 2 INJECTION INTRAVENOUS at 17:45

## 2023-06-29 RX ADMIN — Medication 90 MILLIGRAM(S): at 17:45

## 2023-06-29 RX ADMIN — Medication 3 MILLILITER(S): at 22:18

## 2023-06-29 RX ADMIN — HEPARIN SODIUM 5000 UNIT(S): 5000 INJECTION INTRAVENOUS; SUBCUTANEOUS at 05:56

## 2023-06-29 RX ADMIN — BUDESONIDE AND FORMOTEROL FUMARATE DIHYDRATE 2 PUFF(S): 160; 4.5 AEROSOL RESPIRATORY (INHALATION) at 07:27

## 2023-06-29 RX ADMIN — FENTANYL CITRATE 100 MICROGRAM(S): 50 INJECTION INTRAVENOUS at 18:15

## 2023-06-29 RX ADMIN — Medication 100 MILLIGRAM(S): at 05:56

## 2023-06-29 RX ADMIN — FENTANYL CITRATE 3 MICROGRAM(S)/KG/HR: 50 INJECTION INTRAVENOUS at 21:22

## 2023-06-29 RX ADMIN — FENTANYL CITRATE 100 MICROGRAM(S): 50 INJECTION INTRAVENOUS at 18:45

## 2023-06-29 RX ADMIN — Medication 40 MILLIEQUIVALENT(S): at 10:34

## 2023-06-29 RX ADMIN — CHLORHEXIDINE GLUCONATE 5 MILLILITER(S): 213 SOLUTION TOPICAL at 19:30

## 2023-06-29 RX ADMIN — LIDOCAINE HYDROCHLORIDE AND EPINEPHRINE 20 MILLILITER(S): 10; 10 INJECTION, SOLUTION INFILTRATION; PERINEURAL at 18:20

## 2023-06-29 RX ADMIN — Medication 5.62 MICROGRAM(S)/KG/MIN: at 18:20

## 2023-06-29 RX ADMIN — QUETIAPINE FUMARATE 25 MILLIGRAM(S): 200 TABLET, FILM COATED ORAL at 13:12

## 2023-06-29 RX ADMIN — CISATRACURIUM BESYLATE 10.8 MICROGRAM(S)/KG/MIN: 2 INJECTION INTRAVENOUS at 21:22

## 2023-06-29 RX ADMIN — CISATRACURIUM BESYLATE 20 MILLIGRAM(S): 2 INJECTION INTRAVENOUS at 18:23

## 2023-06-29 RX ADMIN — CHLORHEXIDINE GLUCONATE 5 MILLILITER(S): 213 SOLUTION TOPICAL at 05:55

## 2023-06-29 RX ADMIN — MIDAZOLAM HYDROCHLORIDE 8 MILLIGRAM(S): 1 INJECTION, SOLUTION INTRAMUSCULAR; INTRAVENOUS at 18:18

## 2023-06-29 NOTE — PROGRESS NOTE ADULT - SUBJECTIVE AND OBJECTIVE BOX
**INCOMPLETE NOTE    INTERVAL HPI/OVERNIGHT EVENTS:    OVERNIGHT: No overnight events.  SUBJECTIVE: Patient seen and examined at bedside.     ROS:  CV: Denies chest pain  Resp: Denies SOB  GI: Denies abdominal pain, constipation, diarrhea, nausea, vomiting  : Denies dysuria  ID: Denies fevers, chills  MSK: Denies joint pain     OBJECTIVE:    VITAL SIGNS:  ICU Vital Signs Last 24 Hrs  T(C): 35.9 (29 Jun 2023 16:58), Max: 36.8 (29 Jun 2023 01:01)  T(F): 96.7 (29 Jun 2023 16:58), Max: 98.3 (29 Jun 2023 01:01)  HR: 92 (29 Jun 2023 16:42) (81 - 96)  BP: 138/65 (29 Jun 2023 16:42) (125/73 - 138/65)  BP(mean): 93 (29 Jun 2023 16:42) (92 - 96)  ABP: 177/84 (29 Jun 2023 07:00) (120/53 - 177/84)  ABP(mean): 119 (29 Jun 2023 07:00) (76 - 119)  RR: 18 (29 Jun 2023 16:42) (15 - 20)  SpO2: 100% (29 Jun 2023 17:48) (93% - 100%)    O2 Parameters below as of 29 Jun 2023 16:42  Patient On (Oxygen Delivery Method): room air          Mode: AC/ CMV (Assist Control/ Continuous Mandatory Ventilation), RR (machine): 12, TV (machine): 400, FiO2: 100, PEEP: 5, ITime: 1, MAP: 7, PIP: 18    06-28 @ 07:01 - 06-29 @ 07:00  --------------------------------------------------------  IN: 753 mL / OUT: 2750 mL / NET: -1997 mL    06-29 @ 07:01  -  06-29 @ 18:00  --------------------------------------------------------  IN: 50 mL / OUT: 1020 mL / NET: -970 mL      CAPILLARY BLOOD GLUCOSE          PHYSICAL EXAM:  General: NAD, comfortable  HEENT: NCAT, PERRL, clear conjunctiva, no scleral icterus  Neck: supple, no JVD  Respiratory: CTA b/l, no wheezing, rhonchi, rales  Cardiovascular: RRR, normal S1S2, no M/R/G  Vascular: 2+ radial and DP pulses  Abdomen: soft, NT/ND, bowel sounds in all four quadrants, no palpable masses  Extremities: WWP, no clubbing, cyanosis, or edema  Skin: No rashes present  Neuro:     MEDICATIONS:  MEDICATIONS  (STANDING):  aspirin enteric coated 81 milliGRAM(s) Oral daily  atorvastatin 20 milliGRAM(s) Oral at bedtime  budesonide  80 MICROgram(s)/formoterol 4.5 MICROgram(s) Inhaler 2 Puff(s) Inhalation two times a day  busPIRone 7.5 milliGRAM(s) Oral <User Schedule>  ceFAZolin   IVPB 2000 milliGRAM(s) IV Intermittent every 8 hours  chlorhexidine 0.12% Liquid 5 milliLiter(s) Oral Mucosa two times a day  clopidogrel Tablet 75 milliGRAM(s) Oral daily  etomidate Injectable 20 milliGRAM(s) IV Push once  heparin   Injectable 5000 Unit(s) SubCutaneous every 8 hours  lidocaine 1%/epinephrine 1:100,000 Inj 20 milliLiter(s) Local Injection once  norepinephrine Infusion 0.05 MICROgram(s)/kG/Min (5.62 mL/Hr) IV Continuous <Continuous>  pantoprazole    Tablet 40 milliGRAM(s) Oral before breakfast  polyethylene glycol 3350 17 Gram(s) Oral daily  propofol Infusion 10 MICROgram(s)/kG/Min (3.59 mL/Hr) IV Continuous <Continuous>  QUEtiapine 25 milliGRAM(s) Oral daily  sodium chloride 0.9%. 1000 milliLiter(s) (10 mL/Hr) IV Continuous <Continuous>  succinylcholine Injectable 90 milliGRAM(s) IV Push once  tranexamic acid Injectable for Nebulization 500 milliGRAM(s) Inhalation every 8 hours    MEDICATIONS  (PRN):      ALLERGIES:  Allergies    Bactrim (Other)  Shrimp (Unknown)  sulfa drugs (Unknown)  Lobster (Unknown)  unknown (Ototoxicity)    Intolerances        LABS:                        12.2   12.67 )-----------( 167      ( 29 Jun 2023 02:23 )             36.5     06-29    139  |  104  |  8   ----------------------------<  121<H>  3.7   |  24  |  0.48<L>    Ca    8.2<L>      29 Jun 2023 02:23  Phos  3.9     06-28  Mg     2.0     06-29    TPro  7.4  /  Alb  3.7  /  TBili  1.2  /  DBili  x   /  AST  29  /  ALT  18  /  AlkPhos  93  06-29    PT/INR - ( 29 Jun 2023 02:23 )   PT: 13.6 sec;   INR: 1.14          PTT - ( 29 Jun 2023 02:23 )  PTT:35.5 sec  Urinalysis Basic - ( 29 Jun 2023 02:23 )    Color: x / Appearance: x / SG: x / pH: x  Gluc: 121 mg/dL / Ketone: x  / Bili: x / Urobili: x   Blood: x / Protein: x / Nitrite: x   Leuk Esterase: x / RBC: x / WBC x   Sq Epi: x / Non Sq Epi: x / Bacteria: x        RADIOLOGY & ADDITIONAL TESTS: Reviewed. ***Transfer from CT surgery --> MICU***    Hospital course: 73yo Cantonese-speaking F PMH HTN, HLD, CAD s/p bioprosthetic AVR and CABG (11/2018 SVG-RCA, AVR Catalan 21mm), endocarditis (on lifelong penicillin), AF (Warfarin), AAA, PVD, bronchiectasis, presented 6/28 w/ worsening cough w/ hemoptysis, now s/p ENT scope showing no bleed source. Also s/p Watchman and worsening hemoptysis and transferred to MICU. Intubated 6/29 and s/p bronch showing blood-filled R lung c/f bleeding bronchial artery.    SUBJECTIVE: Patient seen and examined at bedside. No acute complaints, intermittently coughing up small amounts of bloody sputum.    ROS:  CV: Denies chest pain  Respiratory: Denies SOB or difficulty breathing  GI: Denies abdominal pain, N/V/D    OBJECTIVE:    VITAL SIGNS:  ICU Vital Signs Last 24 Hrs  T(C): 35.9 (29 Jun 2023 16:58), Max: 36.8 (29 Jun 2023 01:01)  T(F): 96.7 (29 Jun 2023 16:58), Max: 98.3 (29 Jun 2023 01:01)  HR: 92 (29 Jun 2023 16:42) (81 - 96)  BP: 138/65 (29 Jun 2023 16:42) (125/73 - 138/65)  BP(mean): 93 (29 Jun 2023 16:42) (92 - 96)  ABP: 177/84 (29 Jun 2023 07:00) (120/53 - 177/84)  ABP(mean): 119 (29 Jun 2023 07:00) (76 - 119)  RR: 18 (29 Jun 2023 16:42) (15 - 20)  SpO2: 100% (29 Jun 2023 17:48) (93% - 100%)    O2 Parameters below as of 29 Jun 2023 16:42  Patient On (Oxygen Delivery Method): room air          Mode: AC/ CMV (Assist Control/ Continuous Mandatory Ventilation), RR (machine): 12, TV (machine): 400, FiO2: 100, PEEP: 5, ITime: 1, MAP: 7, PIP: 18    06-28 @ 07:01  -  06-29 @ 07:00  --------------------------------------------------------  IN: 753 mL / OUT: 2750 mL / NET: -1997 mL    06-29 @ 07:01 - 06-29 @ 18:00  --------------------------------------------------------  IN: 50 mL / OUT: 1020 mL / NET: -970 mL      CAPILLARY BLOOD GLUCOSE          PHYSICAL EXAM:  General: adult female lying in bed in no apparent distress; now intubated and sedated  HEENT: NCAT, clear conjunctiva, no scleral icterus  Neck: supple  Respiratory: diminished breath sounds RLL field  Cardiovascular: RRR, normal S1/S2, no murmurs appreciated  Vascular: 2+ radial and DP pulses b/l  Abdomen: normal bowel sounds, soft, NT/ND  Extremities: WWP  Neuro: AOx3 prior to intubation, currently intubated and sedated     MEDICATIONS:  MEDICATIONS  (STANDING):  aspirin enteric coated 81 milliGRAM(s) Oral daily  atorvastatin 20 milliGRAM(s) Oral at bedtime  budesonide  80 MICROgram(s)/formoterol 4.5 MICROgram(s) Inhaler 2 Puff(s) Inhalation two times a day  busPIRone 7.5 milliGRAM(s) Oral <User Schedule>  ceFAZolin   IVPB 2000 milliGRAM(s) IV Intermittent every 8 hours  chlorhexidine 0.12% Liquid 5 milliLiter(s) Oral Mucosa two times a day  clopidogrel Tablet 75 milliGRAM(s) Oral daily  etomidate Injectable 20 milliGRAM(s) IV Push once  heparin   Injectable 5000 Unit(s) SubCutaneous every 8 hours  lidocaine 1%/epinephrine 1:100,000 Inj 20 milliLiter(s) Local Injection once  norepinephrine Infusion 0.05 MICROgram(s)/kG/Min (5.62 mL/Hr) IV Continuous <Continuous>  pantoprazole    Tablet 40 milliGRAM(s) Oral before breakfast  polyethylene glycol 3350 17 Gram(s) Oral daily  propofol Infusion 10 MICROgram(s)/kG/Min (3.59 mL/Hr) IV Continuous <Continuous>  QUEtiapine 25 milliGRAM(s) Oral daily  sodium chloride 0.9%. 1000 milliLiter(s) (10 mL/Hr) IV Continuous <Continuous>  succinylcholine Injectable 90 milliGRAM(s) IV Push once  tranexamic acid Injectable for Nebulization 500 milliGRAM(s) Inhalation every 8 hours    MEDICATIONS  (PRN):      ALLERGIES:  Allergies    Bactrim (Other)  Shrimp (Unknown)  sulfa drugs (Unknown)  Lobster (Unknown)  unknown (Ototoxicity)    Intolerances        LABS:                        12.2   12.67 )-----------( 167      ( 29 Jun 2023 02:23 )             36.5     06-29    139  |  104  |  8   ----------------------------<  121<H>  3.7   |  24  |  0.48<L>    Ca    8.2<L>      29 Jun 2023 02:23  Phos  3.9     06-28  Mg     2.0     06-29    TPro  7.4  /  Alb  3.7  /  TBili  1.2  /  DBili  x   /  AST  29  /  ALT  18  /  AlkPhos  93  06-29    PT/INR - ( 29 Jun 2023 02:23 )   PT: 13.6 sec;   INR: 1.14          PTT - ( 29 Jun 2023 02:23 )  PTT:35.5 sec  Urinalysis Basic - ( 29 Jun 2023 02:23 )    Color: x / Appearance: x / SG: x / pH: x  Gluc: 121 mg/dL / Ketone: x  / Bili: x / Urobili: x   Blood: x / Protein: x / Nitrite: x   Leuk Esterase: x / RBC: x / WBC x   Sq Epi: x / Non Sq Epi: x / Bacteria: x        RADIOLOGY & ADDITIONAL TESTS: Reviewed. ***Transfer from CT surgery --> MICU***    Hospital course: 71yo Cantonese-speaking F PMH HTN, HLD, CAD s/p bioprosthetic AVR and CABG (11/2018 SVG-RCA, AVR Catalan 21mm), endocarditis (on lifelong penicillin), AF (Warfarin), AAA, PVD, bronchiectasis, presented 6/28 w/ worsening cough w/ hemoptysis, now s/p ENT scope showing no bleed source. Also s/p Watchman and worsening hemoptysis and transferred to MICU. Intubated 6/29 and s/p bronch showing blood-filled R lung c/f bleeding bronchial artery.    SUBJECTIVE: Patient seen and examined at bedside. No acute complaints, intermittently coughing up small amounts of bloody sputum.    ROS:  CV: Denies chest pain  Respiratory: Denies SOB or difficulty breathing  GI: Denies abdominal pain, N/V/D    OBJECTIVE:    VITAL SIGNS:  ICU Vital Signs Last 24 Hrs  T(C): 35.9 (29 Jun 2023 16:58), Max: 36.8 (29 Jun 2023 01:01)  T(F): 96.7 (29 Jun 2023 16:58), Max: 98.3 (29 Jun 2023 01:01)  HR: 92 (29 Jun 2023 16:42) (81 - 96)  BP: 138/65 (29 Jun 2023 16:42) (125/73 - 138/65)  BP(mean): 93 (29 Jun 2023 16:42) (92 - 96)  ABP: 177/84 (29 Jun 2023 07:00) (120/53 - 177/84)  ABP(mean): 119 (29 Jun 2023 07:00) (76 - 119)  RR: 18 (29 Jun 2023 16:42) (15 - 20)  SpO2: 100% (29 Jun 2023 17:48) (93% - 100%)    O2 Parameters below as of 29 Jun 2023 16:42  Patient On (Oxygen Delivery Method): room air          Mode: AC/ CMV (Assist Control/ Continuous Mandatory Ventilation), RR (machine): 12, TV (machine): 400, FiO2: 100, PEEP: 5, ITime: 1, MAP: 7, PIP: 18    06-28 @ 07:01  -  06-29 @ 07:00  --------------------------------------------------------  IN: 753 mL / OUT: 2750 mL / NET: -1997 mL    06-29 @ 07:01 - 06-29 @ 18:00  --------------------------------------------------------  IN: 50 mL / OUT: 1020 mL / NET: -970 mL      CAPILLARY BLOOD GLUCOSE          PHYSICAL EXAM:  General: adult female lying in bed in no apparent distress; now intubated and sedated  HEENT: NCAT, clear conjunctiva, no scleral icterus  Neck: supple  Respiratory: diminished breath sounds RLL field  Cardiovascular: RRR, normal S1/S2, no murmurs appreciated  Vascular: 2+ radial and DP pulses b/l  Abdomen: normal bowel sounds, soft, NT/ND  Extremities: WWP  Neuro: AOx3 prior to intubation, currently intubated and sedated     MEDICATIONS:  MEDICATIONS  (STANDING):  aspirin enteric coated 81 milliGRAM(s) Oral daily  atorvastatin 20 milliGRAM(s) Oral at bedtime  budesonide  80 MICROgram(s)/formoterol 4.5 MICROgram(s) Inhaler 2 Puff(s) Inhalation two times a day  busPIRone 7.5 milliGRAM(s) Oral <User Schedule>  ceFAZolin   IVPB 2000 milliGRAM(s) IV Intermittent every 8 hours  chlorhexidine 0.12% Liquid 5 milliLiter(s) Oral Mucosa two times a day  clopidogrel Tablet 75 milliGRAM(s) Oral daily  etomidate Injectable 20 milliGRAM(s) IV Push once  heparin   Injectable 5000 Unit(s) SubCutaneous every 8 hours  lidocaine 1%/epinephrine 1:100,000 Inj 20 milliLiter(s) Local Injection once  norepinephrine Infusion 0.05 MICROgram(s)/kG/Min (5.62 mL/Hr) IV Continuous <Continuous>  pantoprazole    Tablet 40 milliGRAM(s) Oral before breakfast  polyethylene glycol 3350 17 Gram(s) Oral daily  propofol Infusion 10 MICROgram(s)/kG/Min (3.59 mL/Hr) IV Continuous <Continuous>  QUEtiapine 25 milliGRAM(s) Oral daily  sodium chloride 0.9%. 1000 milliLiter(s) (10 mL/Hr) IV Continuous <Continuous>  succinylcholine Injectable 90 milliGRAM(s) IV Push once  tranexamic acid Injectable for Nebulization 500 milliGRAM(s) Inhalation every 8 hours    MEDICATIONS  (PRN):      ALLERGIES:  Allergies    Bactrim (Other)  Shrimp (Unknown)  sulfa drugs (Unknown)  Lobster (Unknown)  unknown (Ototoxicity)    Intolerances        LABS:                        12.2   12.67 )-----------( 167      ( 29 Jun 2023 02:23 )             36.5     06-29    139  |  104  |  8   ----------------------------<  121<H>  3.7   |  24  |  0.48<L>    Ca    8.2<L>      29 Jun 2023 02:23  Phos  3.9     06-28  Mg     2.0     06-29    TPro  7.4  /  Alb  3.7  /  TBili  1.2  /  DBili  x   /  AST  29  /  ALT  18  /  AlkPhos  93  06-29    PT/INR - ( 29 Jun 2023 02:23 )   PT: 13.6 sec;   INR: 1.14          PTT - ( 29 Jun 2023 02:23 )  PTT:35.5 sec  Urinalysis Basic - ( 29 Jun 2023 02:23 )    Color: x / Appearance: x / SG: x / pH: x  Gluc: 121 mg/dL / Ketone: x  / Bili: x / Urobili: x   Blood: x / Protein: x / Nitrite: x   Leuk Esterase: x / RBC: x / WBC x   Sq Epi: x / Non Sq Epi: x / Bacteria: x        RADIOLOGY & ADDITIONAL TESTS: Reviewed.

## 2023-06-29 NOTE — AIRWAY PLACEMENT NOTE ADULT - BLADE USED:
No current facility-administered medications for this visit. Current Outpatient Medications on File Prior to Visit   Medication Sig Dispense Refill    ranitidine (ZANTAC) 150 MG tablet Take 1 tablet by mouth 2 times daily 60 tablet 3     No current facility-administered medications on file prior to visit. Allergies   Allergen Reactions    Penicillins Rash       Do you take any herbs or supplements that were not prescribed by a doctor? no  Are you taking calcium supplements? not applicable  Are you taking aspirin daily? not applicable    Review of Systems  Do you have pain that bothers you in your daily life? no  Review of Systems   Constitutional: Negative for fever and unexpected weight change. HENT: Negative for ear pain, congestion, sore throat and rhinorrhea. Eyes: Negative for itching and visual disturbance. Respiratory: Negative for cough and shortness of breath. Cardiovascular: Negative for chest pain and leg swelling. Gastrointestinal: Negative for diarrhea, constipation and blood in stool. Endocrine: Negative for polydipsia and polyuria. Genitourinary: Negative for dysuria and hematuria. Musculoskeletal: Negative for back pain and gait problem. Skin: Negative for color change and rash. Positive for superficial scratch at her right arm. Neurological: Negative for dizziness and headaches. Psychiatric/Behavioral: Negative for confusion and agitation. Objective:   HENT:   BP (!) 102/50   Pulse 64   Temp 97.6 °F (36.4 °C) (Oral)   Resp 14   Ht 5' 4\" (1.626 m)   Wt 116 lb (52.6 kg)   SpO2 100%   BMI 19.91 kg/m²   Constitutional: Alert and oriented. Well-nourished. No distress. Head: Normocephalic and atraumatic. Right Ear: External ear normal. TM: no bulging, erythema or fluid seen. Left Ear: External ear normal. TM: no bulging, erythema or fluid seen. Nose: Nose normal.   Mouth/Throat: Oropharynx is clear and moist. teeth in good repair.   Eyes: Pupils are or other dangerous activities under the influence; availability of treatment for abuse. --Sexuality: Discussed sexually transmitted diseases, partner selection, use of condoms, avoidance of unintended pregnancy  and contraceptive alternatives. --Injury prevention: Discussed safety belts, safety helmets, smoke detector, smoking near bedding or upholstery. --Dental health: Discussed importance of regular tooth brushing, flossing, and dental visits. --After hours service discussed with patient    Cass Castillo received counseling on the following healthy behaviors: nutrition and exercise  Reviewed prior labs and health maintenance  Continue current medications, diet and exercise. Discussed use, benefit, and side effects of prescribed medications. Barriers to medication compliance addressed. Patient given educational materials - see patient instructions  Was a self-tracking handout given in paper form or via "[employer]t? No: .    Requested Prescriptions      No prescriptions requested or ordered in this encounter       All patient questions answered. Patient voiced understanding. Quality Measures    Body mass index is 19.91 kg/m². Normal. Weight control planned discussed daily exercise regimen and Healthy diet and regular exercise. BP: (!) 102/50 Blood pressure is low. Treatment plan consists of No treatment change needed.     No results found for: LDLCALC, LDLCHOLESTEROL, LDLDIRECT (goal LDL reduction with dx if diabetes is 50% LDL reduction)      PHQ Scores 7/31/2019 7/30/2018 7/25/2017   PHQ2 Score 1 0 0   PHQ9 Score 1 0 0     Interpretation of Total Score Depression Severity: 1-4 = Minimal depression, 5-9 = Mild depression, 10-14 = Moderate depression, 15-19 = Moderately severe depression, 20-27 = Severe depression     Follow up in one year     (Please note that portions of this note were completed with a voice recognition program. Efforts were made to edit the dictations but occasionally words are mis-transcribed.) Glide S4

## 2023-06-29 NOTE — PROGRESS NOTE ADULT - ASSESSMENT
73yo Cantonese-speaking F PMH HTN, HLD, CAD s/p bioAVR/CABG (11/2018 SVG-RCA, Catalan Inspiris 21mm), endocarditis (on lifelong penicillin), AF on warfarin, AAA, PVD, bronchiectasis, presented 6/28 w/ worsening cough w/ hemoptysis, now s/p ENT scope showing no bleed source. Also s/p Watchman 6/28 and worsening hemoptysis, transferred to MICU 6/29. Intubated 6/29 and s/p bronch showing blood-filled R lung c/f bleeding bronchial artery.    Neuro:  #orientation  AOx3 prior to sedation  Now intubated and sedated after bronchoscopy showed blood-filled R lung, rest of plan for lungs as below.  - on propofol, fentanyl gtt overnight    Cardiovascular:   #CAD s/p CABG  Takes ASA 81mg qd and Atorvastatin 20mg qhs at home.  - holding while intubated and given hemoptysis and concern for active bleeding bronchial artery  - resume AC once bleed embolized/stabilized  - resume Atorvastatin once extubated    #AF  #S/p AVR  #S/p Watchman  Known h/o AF, takes Warfarin at home. Not on any rate or rhythm-control agents at home. S/p watchman placement 6/28 w/ CTS.  - currently in NSR  - holding while intubated and given hemoptysis and concern for active bleeding bronchial artery    Respiratory  #hemoptysis  Presented w/ complaints of hemoptysis, s/p scope w/ ENT showing no active bleeding site. Per pt has worsened over past 1-2 days in the hospital. Now s/p intubation and bronchoscopy showing blood in R lung concerning for bleeding bronchial artery in RLL.  - ordered CTA chest to eval for active bleed, follow up read and call IR re possible intervention  - tranexamic acid nebs q6h standing  - no suctioning given risk of worsening bleeding  - consider repeat CBC ~8-10pm to eval Hgb given repeated hemoptysis and blood visualized during bronchoscopy    #COPD  #Bronchiectasis  Known, uses Albuterol 90mcg/inh 2 puffs q4h PRN and Breo Ellipta 100/25 mcg/INH at home  - holding breathing treatments for now given pt is intubated, resume once extubated    GI  No active issues    Renal  No active issues, stable renal function    Endocrine  No active issues    Hematologic  #hemoptysis  Source possibly RLL bronchial artery seen through bronchoscopy, pending CTA to confirm. Hgb stable.  - follow up repeat CBC    ID  No active infections, new leukocytosis however may be reactive 2/2 hemoptysis vs recent procedure.  - trend WBCs, send infectious work up if concern for new infection    Psych  #depression/anxiety  Takes Buspirone 7.5mg qd and Seroquel 25mg qd at home.  - holding while intubated, continue meds once extubated    PPX  F: none  E: replenish PRN  N: NPO for now while intubated, then dash/tlc diet  GI ppx: protonix 40mg IV qd while intubated  DVT ppx: holding pharmacologic ppx for now given hemoptysis; SCDs  Code status: FULL CODE  Dispo: [facility]-->[facility] 71yo Cantonese-speaking F PMH HTN, HLD, CAD s/p bioAVR/CABG (11/2018 SVG-RCA, Catalan Inspiris 21mm), endocarditis (lifelong penicillin), AF on warfarin, AAA, PVD, bronchiectasis, presented 6/28 w/ worsening cough w/ hemoptysis, now s/p ENT scope showing no bleed source. Also s/p Watchman 6/28 and worsening hemoptysis, transferred to MICU 6/29. Intubated 6/29 and s/p bronch showing blood-filled R lung c/f bleeding bronchial artery.    Neuro:  #orientation  AOx3 prior to sedation  Now intubated and sedated after bronchoscopy showed blood-filled R lung, rest of plan for lungs as below.  - on propofol, fentanyl, nimbex gtt overnight    Cardiovascular:   #CAD s/p CABG  Known, takes ASA 81mg qd and Atorvastatin 20mg qhs at home.  - per CTS on ASA 81mg qd and Plavix 75mg qd while inpatient  - place NGT and continue ASA + Plavix  - resume Atorvastatin once extubated    #AF  #S/p AVR  #S/p Watchman  Known h/o AF, takes Warfarin at home. Not on any rate or rhythm-control agents at home. S/p watchman placement 6/28 w/ CTS.  - currently in NSR  - per CTS on ASA 81mg qd and Plavix 75mg qd while inpatient  - place NGT and continue ASA + Plavix    Respiratory:  #hemoptysis  Presented w/ complaints of hemoptysis, s/p scope w/ ENT showing no active bleeding site. Per pt has worsened over past 1-2 days in the hospital. Now s/p intubation and bronchoscopy showing blood in R lung concerning for bleeding bronchial artery in RLL.  - ordered CTA chest to eval for active bleed, follow up read and call IR re possible intervention  - tranexamic acid nebs q8h standing  - no suctioning given risk of worsening bleeding  - repeat bronchoscopy in AM    #COPD  #Bronchiectasis  Known, uses Albuterol 90mcg/inh 2 puffs q4h PRN and Breo Ellipta 100/25 mcg/INH at home  - holding breathing treatments for now given pt is intubated, resume once extubated    GI:  No active issues    Renal:  No active issues, stable renal function    Endocrine:  No active issues    Hematologic:  #hemoptysis  Source possibly RLL bronchial artery seen through bronchoscopy, pending CTA to confirm. Hgb stable.  - plan re hemoptysis as above under pulm    ID:  #endocarditis  Known chronic endocarditis, on life long Penicillin   - Cefazolin 2g q8h x5 doses (from OR after Watchman)  - plan to resume penicillin 4mil u q6h on 6/30 after last dose of cefazolin    #leukocytosis  New leukocytosis after Watchman procedure and w/ hemoptysis, therefore may be reactive 2/2 given no other signs of new infection.  - trend WBCs, send infectious work up if concern for new infection    Psych:  #depression/anxiety  Takes Buspirone 7.5mg qd and Seroquel 25mg qd at home.  - holding while intubated, continue meds once extubated or once NGT placed    PPX  F: none  E: replenish PRN  N: NPO for now while intubated, then dash/tlc diet  GI ppx: protonix 40mg IV qd while intubated  DVT ppx: holding pharmacologic ppx for now given hemoptysis; SCDs  Code status: FULL CODE  Dispo: [facility]-->MICU

## 2023-06-29 NOTE — PROGRESS NOTE ADULT - ATTENDING COMMENTS
HTN, CAD s/p AVR/CABG, bronchiectasis, AF s/p Watchman procedure c/b severe hemoptysis  physical as above  intubated by us for bronchoscopy  CTA of chest for possible bronchial artery embolization  RASS to -5 with paralysis

## 2023-06-29 NOTE — CONSULT NOTE ADULT - ASSESSMENT
73 y/o Female with PMHx of HTN, HLD, CAD s/p bioAVR/CABG with Dr. Mitul Ly (11/2018 SVG-RCA, Catalan Inspiris 21mm), hx of Endocarditis on lifelong penicillin, AFib, AAA, PVD, presented with bronchiectasis presented for watchmen procedure. Pulmonary consulted for hemoptysis.     #Hemoptysis   #?Bronchiectasis     Seen and examined at bedside. She is having persistent hemoptysis despite being off AC without an ENT source identified - previously with hsitory of epistaxis, but no evidence of this on bedside scope by ENT today. No history to suggest GI source as her hematocrit has been stable - typically bleeds with a pulmonary/bronchial origin do not have exsanguination and this is consistent with that history thus far. SHe has a questionable history of bronchiectasis - on her scan, it is not overly impressive; but this can commonly be a cause of bronchial bleeding. There is oropharyngeal trauma on exam (possible as a combination of trauma from intubation and from YARIEL), but no active bleeding in the oropharynx. Given persistent hemoptysis with clots, she warrants bronchoscopic evaluation.     Recommend:   - transfer to medical ICU for intubation and bronchoscopic evaluation; consented and placed in chart   - standing TXA nebs for next 48 hrs q8h   - cough suppression until she has an airway secured   - will perform BAL for evaluation of BRENNA or other infectious causes of her bleeding  71 y/o Female with PMHx of HTN, HLD, CAD s/p bioAVR/CABG with Dr. Mitul Ly (11/2018 SVG-RCA, Catalan Inspiris 21mm), hx of Endocarditis on lifelong penicillin, AFib, AAA, PVD, presented with bronchiectasis presented for watchmen procedure. Pulmonary consulted for hemoptysis.     #Hemoptysis   #?Bronchiectasis     Seen and examined at bedside. She is having persistent hemoptysis despite being off AC without an ENT source identified - previously with hsitory of epistaxis, but no evidence of this on bedside scope by ENT today. No history to suggest GI source as her hematocrit has been stable - typically bleeds with a pulmonary/bronchial origin do not have exsanguination and this is consistent with that history thus far. SHe has a questionable history of bronchiectasis - on her scan, it is not overly impressive; but this can commonly be a cause of bronchial bleeding. There is oropharyngeal trauma on exam (possible as a combination of trauma from intubation and from YARIEL), but no active bleeding in the oropharynx. Given persistent hemoptysis with clots, she warrants bronchoscopic evaluation.     Recommend:   - transfer to medical ICU for intubation and bronchoscopic evaluation; consented and placed in chart   - standing TXA nebs for next 48 hrs q8h   - cough suppression until she has an airway secured   - will perform BAL for evaluation of BRENNA or other infectious causes of her bleeding     UPDATE:   Large volume bleeding seen in airways, localized to RLL. No focal source for tamponade or local control. Recommend paralysis with deep sedation for next 24 hours. NO SUCTIONING THROUGH ETT. CTA for possible IR evaluation.     S/E/D with Dr. Carter

## 2023-06-29 NOTE — PROGRESS NOTE ADULT - ASSESSMENT
73 y/o Female with PMHx of HTN, HLD, CAD s/p bioAVR/CABG with Dr. Mitul Ly (11/2018 SVG-RCA, Catalan Inspiris 21mm), hx of Endocarditis on lifelong penicillin, AFib, AAA, PVD, presented with bronchiectasis worsening cough with hemoptysis on warfarin which has been stopped, referred by Dr. Mitul Gonzalez to Dr. Nubia Minor for pLAAo evaluation. Cantonese speaking,  was used. Patient underwent CT Heart LA protocol today. Reports no hemopytsis while coumadin is off. Denies CP, SOB, palpitations, orthopnea, LE edema. Patient seen in same day holding area; Reports no changes to PMHx or medications since last seen by our team. Pt underwent watchman device placement 6/28. Immediately post op developed some bradycardia and hypotension which resolved after she emerged from anesthesia. Pt developed some hemoptysis post op, likely related to elevated PTT. POD 1 pt still with hemoptysis today, which she states has gotten worse since yesterday, coags now WNL. Pulm made aware, awaiting reccs.     Plan:    Neurovascular:   -Pain well controlled with current regimen. PRN's: tylenol  -c/w buspar and seroquel    Cardiovascular:   -Hemodynamically stable.   -Monitor: BP, HR, tele  -hx afib    -asa/plavix for now given hemoptysis  -HLD    -c/w lipitor    Respiratory:   -Oxygenating well on room air  -Encourage continued use of IS 10x/hr and frequent ambulation  -CXR: WNL    GI:  -GI PPX: protonix  -PO Diet  -Bowel Regimen: miralax    Renal / :  -Continue to monitor renal function: BUN/Cr 8/.48  -Monitor I/O's daily     Endocrine:    -No hx of DM or thyroid dx    Hematologic:  -CBC: H/H- 12.2/36.5  -Coagulation Panel.    ID:  -CBC: WBC- 12.67  -Continue to observe for SIRS/Sepsis Syndrome.    Prophylaxis:  -DVT prophylaxis held for now given hemoptysis  -Continue with SCD's b/l while patient is at rest     Disposition:  -Discharge home once patient is medically ready

## 2023-06-29 NOTE — PHYSICAL THERAPY INITIAL EVALUATION ADULT - GAIT DEVIATIONS NOTED, PT EVAL
steady, no loss of balance, 5 feet without assistive device with contact guard, slightly unsteady, no loss of balance - reported her cane is with security

## 2023-06-29 NOTE — CONSULT NOTE ADULT - ATTENDING COMMENTS
Massive hemoptysis in setting of a/c and dual antiplatelets. Etiology unclear but possible intubation versus YARIEL trauma. Bronchoscopy with RLL bleeding which was stabilized with cold saline, and suction. Needs a CTA. Will need a repeat bronch in AM. Rest of plan as per above.

## 2023-06-29 NOTE — CONSULT NOTE ADULT - ASSESSMENT
-------------------------------  ASSESSMENT/PLAN:    IMPRESSION: GERMÁN GUPTA  is a 72y Female with HTN, HLD, CAD s/p bioAVR/CABG with Dr. Mitul Ly (11/2018 SVG-RCA, Catalan Inspiris 21mm), hx of Endocarditis on lifelong penicillin, AFib, AAA, PVD, bronchiectasis, with history of hemoptysis and epistaxis while on coumadin, s/p watchman's procedure yesterday, now with recurrence of hemoptysis. On physical exam, soft palate with bruising likely 2/2 intubation but without active bleeding. Scope exam notable for pooling of blood in R piriform, without active accumulation of blood. Source of hemoptysis unlikely to be in upper airway.    RECOMMENDATIONS:  - Recommend further evaluation by pulmonary (more likely given her history of bronchiectasis) and potentially GI for other etiology of hemoptysis  - ENT to sign off, please reconsult as needed    ---  Thank you for the kind referral and for allowing me to share in the care of GERMÁN GUPTA If you have any questions, please do not hesitate to contact me.     Sincerely,  Vaishnavi Arnett  06-29-23 @ 15:48       -------------------------------  ASSESSMENT/PLAN:    IMPRESSION: GERMÁN GUPTA  is a 72y Female with HTN, HLD, CAD s/p bioAVR/CABG with Dr. Mitul Ly (11/2018 SVG-RCA, Catalan Inspiris 21mm), hx of Endocarditis on lifelong penicillin, AFib, AAA, PVD, bronchiectasis, with history of hemoptysis and epistaxis while on coumadin, s/p watchman's procedure yesterday, now with recurrence of hemoptysis. On physical exam, soft palate with bruising likely 2/2 intubation but without active bleeding. Scope exam notable for pooling of blood in R piriform, without active accumulation of blood. Source of hemoptysis unlikely to be in upper airway. She is currently breathing comfortably on room air and protecting her airway.    RECOMMENDATIONS:  - Recommend further evaluation by pulmonary (more likely given her history of bronchiectasis) and potentially GI for other etiology of hemoptysis  - ENT to sign off, please reconsult as needed    ---  Thank you for the kind referral and for allowing me to share in the care of GERMÁN GUPTA If you have any questions, please do not hesitate to contact me.     Sincerely,  Vaishnavi Arnett  06-29-23 @ 15:48       -------------------------------  ASSESSMENT/PLAN:    IMPRESSION: GERMÁN GUPTA  is a 72y Female with HTN, HLD, CAD s/p bioAVR/CABG with Dr. Mitul Ly (11/2018 SVG-RCA, Catalan Inspiris 21mm), hx of Endocarditis on lifelong penicillin, AFib, AAA, PVD, bronchiectasis, with history of hemoptysis and epistaxis while on coumadin, s/p watchman's procedure yesterday, now with recurrence of hemoptysis. On physical exam, soft palate with bruising likely 2/2 intubation but without active bleeding. Scope exam notable for pooling of blood in R piriform, without active accumulation of blood. Source of hemoptysis unlikely to be in upper airway. She is currently breathing comfortably on room air and hemodynamically stable.    RECOMMENDATIONS:  - Recommend further evaluation by pulmonary (more likely given her history of bronchiectasis) and potentially GI for other etiology of hemoptysis  - ENT to sign off, please reconsult as needed    ---  Thank you for the kind referral and for allowing me to share in the care of GERMÁN GUPTA If you have any questions, please do not hesitate to contact me.     Sincerely,  Vaishnavi Arnett  06-29-23 @ 15:48

## 2023-06-29 NOTE — CONSULT NOTE ADULT - SUBJECTIVE AND OBJECTIVE BOX
OTOLARYNGOLOGY (ENT) CONSULTATION NOTE    PATIENT: GERMÁN GUPTA     MRN: [id number]       : 46  DATE OF ADMISSION:23  DATE OF SERVICE:  23 @ 15:48    CHIEF COMPLAINT: hemopytsis    HISTORY OF PRESENT ILLNESS:  GERMÁN GUPTA  is a 72y Female with PMHx of HTN, HLD, CAD s/p bioAVR/CABG with Dr. Mitul Ly (2018 SVG-RCA, Catalan Inspiris 21mm), hx of Endocarditis on lifelong penicillin, AFib, AAA, PVD, with history of hemoptysis and epistaxis while on coumadin, presented for watchman's procedure yesterday. She had recurrence of hemoptysis following procedure, which is consistent with the hemoptysis she had prior to procedure. She is coughing up blood and blood tinged sputum intermittently. She denies any recent epistaxis. She reports history of epistaxis while on coumadin that required cauterization twice, most recently in March. She reports it primarily occurred from her left nostril. She states this has stopped since she stopped coumadin. She denies shortness of breath or chest pain currently. She is currently on aspirin and plavix.       PAST MEDICAL HISTORY:  Atrial fibrillation    History of bronchiectasis    CAD (coronary artery disease)    Hyperlipidemia    HTN (hypertension)    History of peripheral vascular disease    Rheumatic aortic disease    History of endocarditis    History of hemoptysis        CURRENT MEDICATIONS   pantoprazole    Tablet 40 Oral  tranexamic acid Injectable for Nebulization 500 Inhalation      HOME MEDICATIONS:  Albuterol (Eqv-ProAir HFA) 90 mcg/inh inhalation aerosol  amLODIPine 2.5 mg oral tablet  aspirin 81 mg oral tablet  breo ellipta  busPIRone 5 mg oral tablet  clonazePAM 0.5 mg oral tablet  Lipitor 20 mg oral tablet  pantoprazole 40 mg oral delayed release tablet  Seroquel 25 mg oral tablet      ALLERGIES:  Bactrim (Other)  Shrimp (Unknown)  sulfa drugs (Unknown)  Lobster (Unknown)  unknown (Ototoxicity)    SOCIAL HISTORY: Pertinent included in HPI   FAMILY HISTORY      SURGICAL HISTORY:  Elective surgery    Elective surgery        REVIEW OF SYSTEMS: The patient was asked and responded to a review of systems regarding the following symptoms: constitutional, eye, ears, nose, mouth, throat, cardiovascular, respiratory. Pertinent factors have been included in the HPI.       PHYSICAL EXAM:  ENT EXAM-   Constitutional: Sitting up in bed, no hoarseness.     Head:  normocephalic, atraumatic.   Ears:  Bilateral pinna WNL.  Nose:  Septum intact, midline. Inferior turbinates normal bilateral. No bleeding of anterior septum appreciated.  OC/OP:  Tonsils absent. Poor dentition. Moist mucous membranes. Ecchymosis over uvula and bilateral soft palate, bilateral tonsillar pillars. No active bleeding appreciated. No bleeding appreciated in oropharynx. Floor of mouth, buccal mucosa normal.  Neck:  Trachea midline.  Thyroid, parotid and submandibular glands normal.  Lymph:  No cervical adenopathy.  Facial Plastics:   MULTISYSTEM EXAM-  Neuro/Psych:  A&O x 3.  Mood stable.  Cranial nerves: 2-12 grossly intact bilaterally.  Eyes:  EOMI, no nystagmus.  Pulm:  No dyspnea, non-labored breathing  Cardiovascular: Carotid pulses 2+ bilaterally.  No peripheral edema.  Skin:  No rash or lesions on exposed skin of head/neck        LARYNGOSCOPY EXAM:   Pre-procedure diagnosis: hemoptysis  Post-procedure diagnosis: hemoptysis    Verbal consent was obtained from patient prior to procedure.  Flexible laryngoscopy was performed and revealed the following: No bleeding appreciated throughout right nasal cavity. No bleeding in nasopharynx. Base of tongue symmetric and without bleeding. Epiglottis, both aryepiglottic folds and both false vocal folds were normal. True vocal folds were fully mobile and without lesions. Pooled blood in R piriform sinus, volume not actively increasing.     The patient tolerated the procedure well.        Vital Signs:  T(C): 36.3 (23 @ 14:33), Max: 36.8 (23 @ 01:01)  HR: 88 (23 @ 12:43) (67 - 96)  BP: 129/67 (23 @ 12:43) (125/73 - 133/70)  RR: 18 (23 @ 12:43) (15 - 20)  SpO2: 96% (23 @ 12:43) (93% - 97%)                        12.2   12.67 )-----------( 167      ( 2023 02:23 )             36.5        139  |  104  |  8   ----------------------------<  121<H>  3.7   |  24  |  0.48<L>    Ca    8.2<L>      2023 02:23  Phos  3.9       Mg     2.0         TPro  7.4  /  Alb  3.7  /  TBili  1.2  /  DBili  x   /  AST  29  /  ALT  18  /  AlkPhos  93  -   PT/INR - ( 2023 02:23 )   PT: 13.6 sec;   INR: 1.14          PTT - ( 2023 02:23 )  PTT:35.5 [id number]    MICROBIOLOGY:      PATHOLOGY:

## 2023-06-29 NOTE — PHYSICAL THERAPY INITIAL EVALUATION ADULT - ADDITIONAL COMMENTS
ambulates with a cane. Independent with dressing, home health aid assists with showering. home health aid 3.5 hours x 5 days, 4 hours on Sat/Sun. Patient has a shower chair and 1 grab bar.

## 2023-06-29 NOTE — PROGRESS NOTE ADULT - SUBJECTIVE AND OBJECTIVE BOX
Patient discussed on morning rounds with Dr. Guardado    OPERATION & DATE: 6/28 watchman device placement    SUBJECTIVE ASSESSMENT: pt with continued hemoptysis, per pt she feels that the hemoptysis has gotten worse since yesterday.     VITAL SIGNS:  Vital Signs Last 24 Hrs  T(C): 36.6 (29 Jun 2023 05:01), Max: 36.8 (29 Jun 2023 01:01)  T(F): 97.8 (29 Jun 2023 05:01), Max: 98.3 (29 Jun 2023 01:01)  HR: 88 (29 Jun 2023 12:43) (67 - 96)  BP: 129/67 (29 Jun 2023 12:43) (125/73 - 133/70)  BP(mean): 92 (29 Jun 2023 12:43) (92 - 96)  RR: 18 (29 Jun 2023 12:43) (15 - 20)  SpO2: 96% (29 Jun 2023 12:43) (93% - 97%)    Parameters below as of 29 Jun 2023 12:43  Patient On (Oxygen Delivery Method): room air      I&O's Detail    28 Jun 2023 07:01  -  29 Jun 2023 07:00  --------------------------------------------------------  IN:    IV PiggyBack: 200 mL    Oral Fluid: 360 mL    Phenylephrine: 18 mL    sodium chloride 0.9%: 175 mL  Total IN: 753 mL    OUT:    Intermittent Catheterization - Urethral (mL): 800 mL    Voided (mL): 1950 mL  Total OUT: 2750 mL    Total NET: -1997 mL      29 Jun 2023 07:01  -  29 Jun 2023 14:05  --------------------------------------------------------  IN:    sodium chloride 0.9%: 50 mL  Total IN: 50 mL    OUT:    Other (mL): 20 mL    Voided (mL): 800 mL  Total OUT: 820 mL    Total NET: -770 mL        CHEST TUBE: none   LOUISE DRAIN:  none  EPICARDIAL WIRES: none  STITCHES: none  STAPLES: tonan  OBREGON: none  CENTRAL LINE: none  MIDLINE/PICC: none  WOUND VAC: none    PHYSICAL EXAM:  General: resting comfortably in chair in NAD  Neurological: AOx3. Motor skills grossly intact  Cardiovascular: Normal S1/S2. Regular rate/rhythm. No murmurs  Respiratory: Lungs CTA bilaterally. No wheezing or rales  Gastrointestinal: +BS in all 4 quadrants. Non-distended. Soft. Non-tender  Extremities: Strength 5/5 b/l upper/lower extremities. Sensation grossly intact upper/lower extremities. No edema. No calf tenderness.  Vascular: Radial 2+bilaterally, DP 2+ b/l  Incision Sites: NA, groins soft      LABS:                        12.2   12.67 )-----------( 167      ( 29 Jun 2023 02:23 )             36.5     PT/INR - ( 29 Jun 2023 02:23 )   PT: 13.6 sec;   INR: 1.14          PTT - ( 29 Jun 2023 02:23 )  PTT:35.5 sec  06-29    139  |  104  |  8   ----------------------------<  121<H>  3.7   |  24  |  0.48<L>    Ca    8.2<L>      29 Jun 2023 02:23  Phos  3.9     06-28  Mg     2.0     06-29    TPro  7.4  /  Alb  3.7  /  TBili  1.2  /  DBili  x   /  AST  29  /  ALT  18  /  AlkPhos  93  06-29    Urinalysis Basic - ( 29 Jun 2023 02:23 )    Color: x / Appearance: x / SG: x / pH: x  Gluc: 121 mg/dL / Ketone: x  / Bili: x / Urobili: x   Blood: x / Protein: x / Nitrite: x   Leuk Esterase: x / RBC: x / WBC x   Sq Epi: x / Non Sq Epi: x / Bacteria: x      MEDICATIONS  (STANDING):  aspirin enteric coated 81 milliGRAM(s) Oral daily  atorvastatin 20 milliGRAM(s) Oral at bedtime  budesonide  80 MICROgram(s)/formoterol 4.5 MICROgram(s) Inhaler 2 Puff(s) Inhalation two times a day  busPIRone 7.5 milliGRAM(s) Oral <User Schedule>  ceFAZolin   IVPB 2000 milliGRAM(s) IV Intermittent every 8 hours  chlorhexidine 0.12% Liquid 5 milliLiter(s) Oral Mucosa two times a day  clopidogrel Tablet 75 milliGRAM(s) Oral daily  heparin   Injectable 5000 Unit(s) SubCutaneous every 8 hours  pantoprazole    Tablet 40 milliGRAM(s) Oral before breakfast  polyethylene glycol 3350 17 Gram(s) Oral daily  QUEtiapine 25 milliGRAM(s) Oral daily  sodium chloride 0.9%. 1000 milliLiter(s) (10 mL/Hr) IV Continuous <Continuous>    MEDICATIONS  (PRN):  albuterol    90 MICROgram(s) HFA Inhaler 2 Puff(s) Inhalation every 6 hours PRN Shortness of Breath and/or Wheezing    RADIOLOGY & ADDITIONAL TESTS:

## 2023-06-29 NOTE — CONSULT NOTE ADULT - SUBJECTIVE AND OBJECTIVE BOX
PULMONARY SERVICE INITIAL CONSULT NOTE    HPI:  73 y/o Female with PMHx of HTN, HLD, CAD s/p bioAVR/CABG with Dr. Mitul Ly (11/2018 SVG-RCA, Catalan Inspiris 21mm), hx of Endocarditis on lifelong penicillin, AFib, AAA, PVD, presented with bronchiectasis worsening cough with hemoptysis on warfarin which has been stopped, referred by Dr. Mitul Gonzalez to Dr. Nubia Minor for pLAAo evaluation. Cantonese speaking,  was used. Patient underwent CT Heart LA protocol today. Reports no hemopytsis while coumadin is off. Denies CP, SOB, palpitations, orthopnea, LE edema. Patient seen in same day holding area; Reports no changes to PMHx or medications since last seen by our team. Denies acute or current SOB, chest pain, palpitation, N/V/D, fever/chills, recent illness, or any other concerning symptoms.    (28 Jun 2023 08:32)    Additional pulmonary history: HPI as above. Pulmonary consulted for hemoptysis. Seen and examined at bedside. She had recurrence of hemoptysis following procedure, which is consistent with the hemoptysis she had prior to procedure. She is coughing up blood and blood tinged sputum intermittently. She denies any recent epistaxis. She reports history of epistaxis while on coumadin that required cauterization twice, most recently in March. She reports it primarily occurred from her left nostril. She states this has stopped since she stopped coumadin. She denies shortness of breath or chest pain currently. She is currently on aspirin and plavix. No history of GI bleeds or GI evaluation - on lab review, her HH is stable. Visibly coughing up blood with clots. Never smoker.     REVIEW OF SYSTEMS:  Constitutional: No fever, weight loss or fatigue  Eyes: No eye pain, visual disturbances, or discharge  ENMT:  No difficulty hearing, tinnitus, vertigo; No sinus or throat pain  Neck: No pain, stiffness or neck swelling  Respiratory: see HPI  Cardiovascular: No chest pain, palpitations, dizziness or leg swelling  Gastrointestinal: No abdominal or epigastric pain. No nausea, vomiting or hematemesis; No diarrhea or constipation. No melena or hematochezia.  Genitourinary: No dysuria, frequency, hematuria or incontinence  Neurological: No headaches, memory loss, loss of strength, numbness or tremors  Skin: No itching, burning, rashes or lesions   Lymph Nodes: No enlarged glands  Endocrine: No heat or cold intolerance; No hair loss  Musculoskeletal: No joint pain or swelling; No muscle, back or extremity pain  Psychiatric: No depression, anxiety, mood swings or difficulty sleeping  Heme/Lymph: No easy bruising or bleeding gums  Allergy and Immunologic: No hives or eczema    PAST MEDICAL & SURGICAL HISTORY:  Atrial fibrillation      History of bronchiectasis      CAD (coronary artery disease)      Hyperlipidemia      HTN (hypertension)      History of peripheral vascular disease      Rheumatic aortic disease      History of endocarditis      History of hemoptysis      Elective surgery  bioAVR/CABG      Elective surgery  SVG-RCA 11/2018          FAMILY HISTORY:      SOCIAL HISTORY:  Smoking Status: [ ] Current, [ ] Former, [ x ] Never  Pack Years:    MEDICATIONS:  Pulmonary:  albuterol    90 MICROgram(s) HFA Inhaler 2 Puff(s) Inhalation every 6 hours PRN  budesonide  80 MICROgram(s)/formoterol 4.5 MICROgram(s) Inhaler 2 Puff(s) Inhalation two times a day    Antimicrobials:  ceFAZolin   IVPB 2000 milliGRAM(s) IV Intermittent every 8 hours    Anticoagulants:  aspirin enteric coated 81 milliGRAM(s) Oral daily  clopidogrel Tablet 75 milliGRAM(s) Oral daily  heparin   Injectable 5000 Unit(s) SubCutaneous every 8 hours  tranexamic acid Injectable for Nebulization 500 milliGRAM(s) Inhalation every 8 hours    Onc:    GI/:  pantoprazole    Tablet 40 milliGRAM(s) Oral before breakfast  polyethylene glycol 3350 17 Gram(s) Oral daily    Endocrine:  atorvastatin 20 milliGRAM(s) Oral at bedtime    Cardiac:  norepinephrine Infusion 0.05 MICROgram(s)/kG/Min IV Continuous <Continuous>    Other Medications:  busPIRone 7.5 milliGRAM(s) Oral <User Schedule>  chlorhexidine 0.12% Liquid 5 milliLiter(s) Oral Mucosa two times a day  etomidate Injectable 20 milliGRAM(s) IV Push once  lidocaine 1%/epinephrine 1:100,000 Inj 20 milliLiter(s) Local Injection once  propofol Infusion 10 MICROgram(s)/kG/Min IV Continuous <Continuous>  QUEtiapine 25 milliGRAM(s) Oral daily  sodium chloride 0.9%. 1000 milliLiter(s) IV Continuous <Continuous>  succinylcholine Injectable 90 milliGRAM(s) IV Push once      Allergies    Bactrim (Other)  Shrimp (Unknown)  sulfa drugs (Unknown)  Lobster (Unknown)  unknown (Ototoxicity)    Intolerances        Vital Signs Last 24 Hrs  T(C): 35.9 (29 Jun 2023 16:58), Max: 36.8 (29 Jun 2023 01:01)  T(F): 96.7 (29 Jun 2023 16:58), Max: 98.3 (29 Jun 2023 01:01)  HR: 92 (29 Jun 2023 16:42) (77 - 96)  BP: 138/65 (29 Jun 2023 16:42) (125/73 - 138/65)  BP(mean): 93 (29 Jun 2023 16:42) (92 - 96)  RR: 18 (29 Jun 2023 16:42) (15 - 20)  SpO2: 96% (29 Jun 2023 16:42) (93% - 97%)    Parameters below as of 29 Jun 2023 16:42  Patient On (Oxygen Delivery Method): room air        06-28 @ 07:01 - 06-29 @ 07:00  --------------------------------------------------------  IN: 753 mL / OUT: 2750 mL / NET: -1997 mL    06-29 @ 07:01 - 06-29 @ 17:33  --------------------------------------------------------  IN: 50 mL / OUT: 1020 mL / NET: -970 mL          PHYSICAL EXAM:  Constitutional: well-appearing  Head: NC/AT  EENT: PERRL, anicteric sclera; oropharynx clear, MMM; posterior oropharynx with spot hemorrhages, no active bleeding seen in oral cavity   Neck: supple, no appreciable JVD  Respiratory: CTA B/L; no W/R/R  Cardiovascular: +S1/S2, RRR; sternotomy scars   Gastrointestinal: soft, NT/ND  Extremities: WWP; no edema, clubbing or cyanosis  Vascular: 2+ radial pulses B/L  Neurological: awake and alert; PACHECO    LABS:  ABG - ( 29 Jun 2023 02:23 )  pH, Arterial: 7.45  pH, Blood: x     /  pCO2: 35    /  pO2: 79    / HCO3: 24    / Base Excess: 0.7   /  SaO2: 97.8                CBC Full  -  ( 29 Jun 2023 02:23 )  WBC Count : 12.67 K/uL  RBC Count : 4.04 M/uL  Hemoglobin : 12.2 g/dL  Hematocrit : 36.5 %  Platelet Count - Automated : 167 K/uL  Mean Cell Volume : 90.3 fl  Mean Cell Hemoglobin : 30.2 pg  Mean Cell Hemoglobin Concentration : 33.4 gm/dL  Auto Neutrophil # : 10.56 K/uL  Auto Lymphocyte # : 1.53 K/uL  Auto Monocyte # : 0.48 K/uL  Auto Eosinophil # : 0.02 K/uL  Auto Basophil # : 0.03 K/uL  Auto Neutrophil % : 83.3 %  Auto Lymphocyte % : 12.1 %  Auto Monocyte % : 3.8 %  Auto Eosinophil % : 0.2 %  Auto Basophil % : 0.2 %    06-29    139  |  104  |  8   ----------------------------<  121<H>  3.7   |  24  |  0.48<L>    Ca    8.2<L>      29 Jun 2023 02:23  Phos  3.9     06-28  Mg     2.0     06-29    TPro  7.4  /  Alb  3.7  /  TBili  1.2  /  DBili  x   /  AST  29  /  ALT  18  /  AlkPhos  93  06-29    PT/INR - ( 29 Jun 2023 02:23 )   PT: 13.6 sec;   INR: 1.14          PTT - ( 29 Jun 2023 02:23 )  PTT:35.5 sec      Urinalysis Basic - ( 29 Jun 2023 02:23 )    Color: x / Appearance: x / SG: x / pH: x  Gluc: 121 mg/dL / Ketone: x  / Bili: x / Urobili: x   Blood: x / Protein: x / Nitrite: x   Leuk Esterase: x / RBC: x / WBC x   Sq Epi: x / Non Sq Epi: x / Bacteria: x                RADIOLOGY & ADDITIONAL STUDIES: PULMONARY SERVICE INITIAL CONSULT NOTE    HPI:  73 y/o Female with PMHx of HTN, HLD, CAD s/p bioAVR/CABG with Dr. Mitul Ly (11/2018 SVG-RCA, Catalan Inspiris 21mm), hx of Endocarditis on lifelong penicillin, AFib, AAA, PVD, presented with bronchiectasis worsening cough with hemoptysis on warfarin which has been stopped, referred by Dr. Mitul Gonzalez to Dr. Nubia Minor for pLAAo evaluation. Cantonese speaking,  was used. Patient underwent CT Heart LA protocol today. Reports no hemopytsis while coumadin is off. Denies CP, SOB, palpitations, orthopnea, LE edema. Patient seen in same day holding area; Reports no changes to PMHx or medications since last seen by our team. Denies acute or current SOB, chest pain, palpitation, N/V/D, fever/chills, recent illness, or any other concerning symptoms.    (28 Jun 2023 08:32)    Additional pulmonary history: HPI as above. Pulmonary consulted for hemoptysis. Seen and examined at bedside. She had recurrence of hemoptysis following procedure, which is consistent with the hemoptysis she had prior to procedure. She is coughing up blood and blood tinged sputum intermittently. She denies any recent epistaxis. She reports history of epistaxis while on coumadin that required cauterization twice, most recently in March. She reports it primarily occurred from her left nostril. She states this has stopped since she stopped coumadin. She denies shortness of breath or chest pain currently. No dripping in the back of her throat or her nose. She is currently on aspirin and plavix. No history of GI bleeds or GI evaluation - on lab review, her HH is stable. Visibly coughing up blood with clots. Never smoker. No hx of TB or lung cancer. No hx of lung cancer in the family. Reports when hemoptysis occured precviosly CT scan of her chest showed "chronic bronchitis."    REVIEW OF SYSTEMS:  Constitutional: No fever, weight loss or fatigue  Eyes: No eye pain, visual disturbances, or discharge  ENMT:  No difficulty hearing, tinnitus, vertigo; No sinus or throat pain  Neck: No pain, stiffness or neck swelling  Respiratory: see HPI  Cardiovascular: No chest pain, palpitations, dizziness or leg swelling  Gastrointestinal: No abdominal or epigastric pain. No nausea, vomiting or hematemesis; No diarrhea or constipation. No melena or hematochezia.  Genitourinary: No dysuria, frequency, hematuria or incontinence  Neurological: No headaches, memory loss, loss of strength, numbness or tremors  Skin: No itching, burning, rashes or lesions   Lymph Nodes: No enlarged glands  Endocrine: No heat or cold intolerance; No hair loss  Musculoskeletal: No joint pain or swelling; No muscle, back or extremity pain  Psychiatric: No depression, anxiety, mood swings or difficulty sleeping  Heme/Lymph: No easy bruising or bleeding gums  Allergy and Immunologic: No hives or eczema    PAST MEDICAL & SURGICAL HISTORY:  Atrial fibrillation      History of bronchiectasis      CAD (coronary artery disease)      Hyperlipidemia      HTN (hypertension)      History of peripheral vascular disease      Rheumatic aortic disease      History of endocarditis      History of hemoptysis      Elective surgery  bioAVR/CABG      Elective surgery  SVG-RCA 11/2018          FAMILY HISTORY:      SOCIAL HISTORY:  Smoking Status: [ ] Current, [ ] Former, [ x ] Never  Pack Years:    MEDICATIONS:  Pulmonary:  albuterol    90 MICROgram(s) HFA Inhaler 2 Puff(s) Inhalation every 6 hours PRN  budesonide  80 MICROgram(s)/formoterol 4.5 MICROgram(s) Inhaler 2 Puff(s) Inhalation two times a day    Antimicrobials:  ceFAZolin   IVPB 2000 milliGRAM(s) IV Intermittent every 8 hours    Anticoagulants:  aspirin enteric coated 81 milliGRAM(s) Oral daily  clopidogrel Tablet 75 milliGRAM(s) Oral daily  heparin   Injectable 5000 Unit(s) SubCutaneous every 8 hours  tranexamic acid Injectable for Nebulization 500 milliGRAM(s) Inhalation every 8 hours    Onc:    GI/:  pantoprazole    Tablet 40 milliGRAM(s) Oral before breakfast  polyethylene glycol 3350 17 Gram(s) Oral daily    Endocrine:  atorvastatin 20 milliGRAM(s) Oral at bedtime    Cardiac:  norepinephrine Infusion 0.05 MICROgram(s)/kG/Min IV Continuous <Continuous>    Other Medications:  busPIRone 7.5 milliGRAM(s) Oral <User Schedule>  chlorhexidine 0.12% Liquid 5 milliLiter(s) Oral Mucosa two times a day  etomidate Injectable 20 milliGRAM(s) IV Push once  lidocaine 1%/epinephrine 1:100,000 Inj 20 milliLiter(s) Local Injection once  propofol Infusion 10 MICROgram(s)/kG/Min IV Continuous <Continuous>  QUEtiapine 25 milliGRAM(s) Oral daily  sodium chloride 0.9%. 1000 milliLiter(s) IV Continuous <Continuous>  succinylcholine Injectable 90 milliGRAM(s) IV Push once      Allergies    Bactrim (Other)  Shrimp (Unknown)  sulfa drugs (Unknown)  Lobster (Unknown)  unknown (Ototoxicity)    Intolerances        Vital Signs Last 24 Hrs  T(C): 35.9 (29 Jun 2023 16:58), Max: 36.8 (29 Jun 2023 01:01)  T(F): 96.7 (29 Jun 2023 16:58), Max: 98.3 (29 Jun 2023 01:01)  HR: 92 (29 Jun 2023 16:42) (77 - 96)  BP: 138/65 (29 Jun 2023 16:42) (125/73 - 138/65)  BP(mean): 93 (29 Jun 2023 16:42) (92 - 96)  RR: 18 (29 Jun 2023 16:42) (15 - 20)  SpO2: 96% (29 Jun 2023 16:42) (93% - 97%)    Parameters below as of 29 Jun 2023 16:42  Patient On (Oxygen Delivery Method): room air        06-28 @ 07:01 - 06-29 @ 07:00  --------------------------------------------------------  IN: 753 mL / OUT: 2750 mL / NET: -1997 mL    06-29 @ 07:01  -  06-29 @ 17:33  --------------------------------------------------------  IN: 50 mL / OUT: 1020 mL / NET: -970 mL          PHYSICAL EXAM:  Constitutional: well-appearing  Head: NC/AT  EENT: PERRL, anicteric sclera; oropharynx clear, MMM; posterior oropharynx with spot hemorrhages, no active bleeding seen in oral cavity   Neck: supple, no appreciable JVD  Respiratory: CTA B/L; no W/R/R  Cardiovascular: +S1/S2, RRR; sternotomy scars   Gastrointestinal: soft, NT/ND  Extremities: WWP; no edema, clubbing or cyanosis  Vascular: 2+ radial pulses B/L  Neurological: awake and alert; PACHECO    LABS:  ABG - ( 29 Jun 2023 02:23 )  pH, Arterial: 7.45  pH, Blood: x     /  pCO2: 35    /  pO2: 79    / HCO3: 24    / Base Excess: 0.7   /  SaO2: 97.8                CBC Full  -  ( 29 Jun 2023 02:23 )  WBC Count : 12.67 K/uL  RBC Count : 4.04 M/uL  Hemoglobin : 12.2 g/dL  Hematocrit : 36.5 %  Platelet Count - Automated : 167 K/uL  Mean Cell Volume : 90.3 fl  Mean Cell Hemoglobin : 30.2 pg  Mean Cell Hemoglobin Concentration : 33.4 gm/dL  Auto Neutrophil # : 10.56 K/uL  Auto Lymphocyte # : 1.53 K/uL  Auto Monocyte # : 0.48 K/uL  Auto Eosinophil # : 0.02 K/uL  Auto Basophil # : 0.03 K/uL  Auto Neutrophil % : 83.3 %  Auto Lymphocyte % : 12.1 %  Auto Monocyte % : 3.8 %  Auto Eosinophil % : 0.2 %  Auto Basophil % : 0.2 %    06-29    139  |  104  |  8   ----------------------------<  121<H>  3.7   |  24  |  0.48<L>    Ca    8.2<L>      29 Jun 2023 02:23  Phos  3.9     06-28  Mg     2.0     06-29    TPro  7.4  /  Alb  3.7  /  TBili  1.2  /  DBili  x   /  AST  29  /  ALT  18  /  AlkPhos  93  06-29    PT/INR - ( 29 Jun 2023 02:23 )   PT: 13.6 sec;   INR: 1.14          PTT - ( 29 Jun 2023 02:23 )  PTT:35.5 sec      Urinalysis Basic - ( 29 Jun 2023 02:23 )    Color: x / Appearance: x / SG: x / pH: x  Gluc: 121 mg/dL / Ketone: x  / Bili: x / Urobili: x   Blood: x / Protein: x / Nitrite: x   Leuk Esterase: x / RBC: x / WBC x   Sq Epi: x / Non Sq Epi: x / Bacteria: x                RADIOLOGY & ADDITIONAL STUDIES:

## 2023-06-30 ENCOUNTER — TRANSCRIPTION ENCOUNTER (OUTPATIENT)
Age: 73
End: 2023-06-30

## 2023-06-30 LAB
ALBUMIN SERPL ELPH-MCNC: 3.3 G/DL — SIGNIFICANT CHANGE UP (ref 3.3–5)
ALP SERPL-CCNC: 77 U/L — SIGNIFICANT CHANGE UP (ref 40–120)
ALT FLD-CCNC: 11 U/L — SIGNIFICANT CHANGE UP (ref 10–45)
ANION GAP SERPL CALC-SCNC: 11 MMOL/L — SIGNIFICANT CHANGE UP (ref 5–17)
APPEARANCE UR: CLEAR — SIGNIFICANT CHANGE UP
AST SERPL-CCNC: 27 U/L — SIGNIFICANT CHANGE UP (ref 10–40)
B PERT IGG+IGM PNL SER: SIGNIFICANT CHANGE UP
BACTERIA # UR AUTO: PRESENT /HPF
BASE EXCESS BLDA CALC-SCNC: -1.8 MMOL/L — SIGNIFICANT CHANGE UP (ref -2–3)
BILIRUB SERPL-MCNC: 1.2 MG/DL — SIGNIFICANT CHANGE UP (ref 0.2–1.2)
BILIRUB UR-MCNC: ABNORMAL
BLD GP AB SCN SERPL QL: NEGATIVE — SIGNIFICANT CHANGE UP
BUN SERPL-MCNC: 11 MG/DL — SIGNIFICANT CHANGE UP (ref 7–23)
CALCIUM SERPL-MCNC: 7.8 MG/DL — LOW (ref 8.4–10.5)
CHLORIDE SERPL-SCNC: 104 MMOL/L — SIGNIFICANT CHANGE UP (ref 96–108)
CO2 BLDA-SCNC: 24 MMOL/L — SIGNIFICANT CHANGE UP (ref 19–24)
CO2 SERPL-SCNC: 24 MMOL/L — SIGNIFICANT CHANGE UP (ref 22–31)
COD CRY URNS QL: ABNORMAL /HPF
COLOR FLD: SIGNIFICANT CHANGE UP
COLOR SPEC: YELLOW — SIGNIFICANT CHANGE UP
COMMENT - FLUIDS: SIGNIFICANT CHANGE UP
CREAT ?TM UR-MCNC: 186 MG/DL — SIGNIFICANT CHANGE UP
CREAT SERPL-MCNC: 0.58 MG/DL — SIGNIFICANT CHANGE UP (ref 0.5–1.3)
DIFF PNL FLD: NEGATIVE — SIGNIFICANT CHANGE UP
EGFR: 95 ML/MIN/1.73M2 — SIGNIFICANT CHANGE UP
EOSINOPHIL # FLD: 1 % — SIGNIFICANT CHANGE UP
EPI CELLS # UR: SIGNIFICANT CHANGE UP /HPF (ref 0–5)
FLUID INTAKE SUBSTANCE CLASS: SIGNIFICANT CHANGE UP
GAS PNL BLDA: SIGNIFICANT CHANGE UP
GAS PNL BLDA: SIGNIFICANT CHANGE UP
GLUCOSE SERPL-MCNC: 142 MG/DL — HIGH (ref 70–99)
GLUCOSE UR QL: NEGATIVE — SIGNIFICANT CHANGE UP
GRAM STN FLD: SIGNIFICANT CHANGE UP
GRAM STN FLD: SIGNIFICANT CHANGE UP
GRAN CASTS # UR COMP ASSIST: ABNORMAL /LPF
HCO3 BLDA-SCNC: 23 MMOL/L — SIGNIFICANT CHANGE UP (ref 21–28)
HCT VFR BLD CALC: 34 % — LOW (ref 34.5–45)
HGB BLD-MCNC: 11.1 G/DL — LOW (ref 11.5–15.5)
KETONES UR-MCNC: ABNORMAL MG/DL
LACTATE SERPL-SCNC: 1 MMOL/L — SIGNIFICANT CHANGE UP (ref 0.5–2)
LEUKOCYTE ESTERASE UR-ACNC: NEGATIVE — SIGNIFICANT CHANGE UP
LYMPHOCYTES # FLD: 6 % — SIGNIFICANT CHANGE UP
MCHC RBC-ENTMCNC: 30.2 PG — SIGNIFICANT CHANGE UP (ref 27–34)
MCHC RBC-ENTMCNC: 32.6 GM/DL — SIGNIFICANT CHANGE UP (ref 32–36)
MCV RBC AUTO: 92.6 FL — SIGNIFICANT CHANGE UP (ref 80–100)
MONOS+MACROS # FLD: 8 % — SIGNIFICANT CHANGE UP
MRSA PCR RESULT.: NEGATIVE — SIGNIFICANT CHANGE UP
NEUTROPHILS-BODY FLUID: 85 % — SIGNIFICANT CHANGE UP
NITRITE UR-MCNC: NEGATIVE — SIGNIFICANT CHANGE UP
NRBC # BLD: 0 /100 WBCS — SIGNIFICANT CHANGE UP (ref 0–0)
OSMOLALITY UR: 765 MOSM/KG — SIGNIFICANT CHANGE UP (ref 300–900)
PCO2 BLDA: 39 MMHG — SIGNIFICANT CHANGE UP (ref 32–45)
PH BLDA: 7.38 — SIGNIFICANT CHANGE UP (ref 7.35–7.45)
PH UR: 5 — SIGNIFICANT CHANGE UP (ref 5–8)
PLATELET # BLD AUTO: 166 K/UL — SIGNIFICANT CHANGE UP (ref 150–400)
PO2 BLDA: 132 MMHG — HIGH (ref 83–108)
POTASSIUM SERPL-MCNC: 4 MMOL/L — SIGNIFICANT CHANGE UP (ref 3.5–5.3)
POTASSIUM SERPL-SCNC: 4 MMOL/L — SIGNIFICANT CHANGE UP (ref 3.5–5.3)
PROCALCITONIN SERPL-MCNC: 0.12 NG/ML — HIGH (ref 0.02–0.1)
PROT ?TM UR-MCNC: 170 MG/DL — HIGH (ref 0–12)
PROT SERPL-MCNC: 6.8 G/DL — SIGNIFICANT CHANGE UP (ref 6–8.3)
PROT UR-MCNC: 30 MG/DL
PROT/CREAT UR-RTO: 0.9 RATIO — HIGH (ref 0–0.2)
RBC # BLD: 3.67 M/UL — LOW (ref 3.8–5.2)
RBC # FLD: 13.4 % — SIGNIFICANT CHANGE UP (ref 10.3–14.5)
RBC CASTS # UR COMP ASSIST: < 5 /HPF — SIGNIFICANT CHANGE UP
RCV VOL RI: 8900 /UL — HIGH (ref 0–0)
RH IG SCN BLD-IMP: POSITIVE — SIGNIFICANT CHANGE UP
S AUREUS DNA NOSE QL NAA+PROBE: NEGATIVE — SIGNIFICANT CHANGE UP
SAO2 % BLDA: 100 % — HIGH (ref 94–98)
SODIUM SERPL-SCNC: 139 MMOL/L — SIGNIFICANT CHANGE UP (ref 135–145)
SODIUM UR-SCNC: <20 MMOL/L — SIGNIFICANT CHANGE UP
SP GR SPEC: >=1.03 — SIGNIFICANT CHANGE UP (ref 1–1.03)
SPECIMEN SOURCE FLD: SIGNIFICANT CHANGE UP
SPECIMEN SOURCE: SIGNIFICANT CHANGE UP
SPECIMEN SOURCE: SIGNIFICANT CHANGE UP
TOTAL NUCLEATED CELL COUNT, BODY FLUID: 6350 /UL — SIGNIFICANT CHANGE UP
TUBE TYPE: SIGNIFICANT CHANGE UP
UROBILINOGEN FLD QL: 0.2 E.U./DL — SIGNIFICANT CHANGE UP
UUN UR-MCNC: 941 MG/DL — SIGNIFICANT CHANGE UP
WBC # BLD: 16.61 K/UL — HIGH (ref 3.8–10.5)
WBC # FLD AUTO: 16.61 K/UL — HIGH (ref 3.8–10.5)
WBC UR QL: < 5 /HPF — SIGNIFICANT CHANGE UP

## 2023-06-30 PROCEDURE — 99233 SBSQ HOSP IP/OBS HIGH 50: CPT | Mod: GC,25

## 2023-06-30 PROCEDURE — 99291 CRITICAL CARE FIRST HOUR: CPT

## 2023-06-30 PROCEDURE — 36600 WITHDRAWAL OF ARTERIAL BLOOD: CPT

## 2023-06-30 PROCEDURE — 71045 X-RAY EXAM CHEST 1 VIEW: CPT | Mod: 26

## 2023-06-30 PROCEDURE — 43752 NASAL/OROGASTRIC W/TUBE PLMT: CPT

## 2023-06-30 PROCEDURE — 99222 1ST HOSP IP/OBS MODERATE 55: CPT

## 2023-06-30 PROCEDURE — 71045 X-RAY EXAM CHEST 1 VIEW: CPT | Mod: 26,77

## 2023-06-30 PROCEDURE — 31624 DX BRONCHOSCOPE/LAVAGE: CPT | Mod: GC

## 2023-06-30 RX ORDER — CLOPIDOGREL BISULFATE 75 MG/1
75 TABLET, FILM COATED ORAL DAILY
Refills: 0 | Status: DISCONTINUED | OUTPATIENT
Start: 2023-07-01 | End: 2023-07-12

## 2023-06-30 RX ORDER — ACETAMINOPHEN 500 MG
1000 TABLET ORAL ONCE
Refills: 0 | Status: COMPLETED | OUTPATIENT
Start: 2023-06-30 | End: 2023-06-30

## 2023-06-30 RX ORDER — ACETAMINOPHEN 500 MG
650 TABLET ORAL ONCE
Refills: 0 | Status: DISCONTINUED | OUTPATIENT
Start: 2023-06-30 | End: 2023-06-30

## 2023-06-30 RX ORDER — ASPIRIN/CALCIUM CARB/MAGNESIUM 324 MG
81 TABLET ORAL EVERY 24 HOURS
Refills: 0 | Status: DISCONTINUED | OUTPATIENT
Start: 2023-07-01 | End: 2023-07-12

## 2023-06-30 RX ORDER — PIPERACILLIN AND TAZOBACTAM 4; .5 G/20ML; G/20ML
4.5 INJECTION, POWDER, LYOPHILIZED, FOR SOLUTION INTRAVENOUS EVERY 8 HOURS
Refills: 0 | Status: COMPLETED | OUTPATIENT
Start: 2023-06-30 | End: 2023-07-07

## 2023-06-30 RX ORDER — ASPIRIN/CALCIUM CARB/MAGNESIUM 324 MG
81 TABLET ORAL ONCE
Refills: 0 | Status: COMPLETED | OUTPATIENT
Start: 2023-06-30 | End: 2023-06-30

## 2023-06-30 RX ORDER — FENTANYL CITRATE 50 UG/ML
0.5 INJECTION INTRAVENOUS
Qty: 2500 | Refills: 0 | Status: DISCONTINUED | OUTPATIENT
Start: 2023-06-30 | End: 2023-06-30

## 2023-06-30 RX ORDER — SODIUM CHLORIDE 9 MG/ML
1000 INJECTION, SOLUTION INTRAVENOUS
Refills: 0 | Status: DISCONTINUED | OUTPATIENT
Start: 2023-06-30 | End: 2023-07-02

## 2023-06-30 RX ORDER — FENTANYL CITRATE 50 UG/ML
1 INJECTION INTRAVENOUS
Qty: 2500 | Refills: 0 | Status: DISCONTINUED | OUTPATIENT
Start: 2023-06-30 | End: 2023-07-01

## 2023-06-30 RX ORDER — CLOPIDOGREL BISULFATE 75 MG/1
75 TABLET, FILM COATED ORAL ONCE
Refills: 0 | Status: COMPLETED | OUTPATIENT
Start: 2023-06-30 | End: 2023-06-30

## 2023-06-30 RX ORDER — SODIUM CHLORIDE 9 MG/ML
1000 INJECTION, SOLUTION INTRAVENOUS ONCE
Refills: 0 | Status: COMPLETED | OUTPATIENT
Start: 2023-06-30 | End: 2023-06-30

## 2023-06-30 RX ORDER — PIPERACILLIN AND TAZOBACTAM 4; .5 G/20ML; G/20ML
4.5 INJECTION, POWDER, LYOPHILIZED, FOR SOLUTION INTRAVENOUS ONCE
Refills: 0 | Status: COMPLETED | OUTPATIENT
Start: 2023-06-30 | End: 2023-06-30

## 2023-06-30 RX ORDER — SODIUM CHLORIDE 9 MG/ML
1000 INJECTION INTRAMUSCULAR; INTRAVENOUS; SUBCUTANEOUS ONCE
Refills: 0 | Status: COMPLETED | OUTPATIENT
Start: 2023-06-30 | End: 2023-06-30

## 2023-06-30 RX ADMIN — Medication 3 MILLILITER(S): at 21:57

## 2023-06-30 RX ADMIN — CLOPIDOGREL BISULFATE 75 MILLIGRAM(S): 75 TABLET, FILM COATED ORAL at 19:34

## 2023-06-30 RX ADMIN — Medication 5.62 MICROGRAM(S)/KG/MIN: at 11:08

## 2023-06-30 RX ADMIN — SODIUM CHLORIDE 1000 MILLILITER(S): 9 INJECTION INTRAMUSCULAR; INTRAVENOUS; SUBCUTANEOUS at 17:42

## 2023-06-30 RX ADMIN — SODIUM CHLORIDE 75 MILLILITER(S): 9 INJECTION, SOLUTION INTRAVENOUS at 21:52

## 2023-06-30 RX ADMIN — CHLORHEXIDINE GLUCONATE 5 MILLILITER(S): 213 SOLUTION TOPICAL at 06:01

## 2023-06-30 RX ADMIN — Medication 5.62 MICROGRAM(S)/KG/MIN: at 22:03

## 2023-06-30 RX ADMIN — Medication 81 MILLIGRAM(S): at 19:34

## 2023-06-30 RX ADMIN — TRANEXAMIC ACID 500 MILLIGRAM(S): 100 INJECTION, SOLUTION INTRAVENOUS at 16:32

## 2023-06-30 RX ADMIN — PROPOFOL 3.59 MICROGRAM(S)/KG/MIN: 10 INJECTION, EMULSION INTRAVENOUS at 00:12

## 2023-06-30 RX ADMIN — CHLORHEXIDINE GLUCONATE 5 MILLILITER(S): 213 SOLUTION TOPICAL at 17:42

## 2023-06-30 RX ADMIN — TRANEXAMIC ACID 500 MILLIGRAM(S): 100 INJECTION, SOLUTION INTRAVENOUS at 21:58

## 2023-06-30 RX ADMIN — Medication 1000 MILLIGRAM(S): at 06:13

## 2023-06-30 RX ADMIN — PROPOFOL 3.59 MICROGRAM(S)/KG/MIN: 10 INJECTION, EMULSION INTRAVENOUS at 01:12

## 2023-06-30 RX ADMIN — Medication 650 MILLIGRAM(S): at 14:50

## 2023-06-30 RX ADMIN — PIPERACILLIN AND TAZOBACTAM 25 GRAM(S): 4; .5 INJECTION, POWDER, LYOPHILIZED, FOR SOLUTION INTRAVENOUS at 21:52

## 2023-06-30 RX ADMIN — CHLORHEXIDINE GLUCONATE 1 APPLICATION(S): 213 SOLUTION TOPICAL at 06:02

## 2023-06-30 RX ADMIN — Medication 400 MILLIGRAM(S): at 06:01

## 2023-06-30 RX ADMIN — PROPOFOL 3.59 MICROGRAM(S)/KG/MIN: 10 INJECTION, EMULSION INTRAVENOUS at 11:08

## 2023-06-30 RX ADMIN — Medication 3 MILLILITER(S): at 11:35

## 2023-06-30 RX ADMIN — ATORVASTATIN CALCIUM 20 MILLIGRAM(S): 80 TABLET, FILM COATED ORAL at 21:52

## 2023-06-30 RX ADMIN — POLYETHYLENE GLYCOL 3350 17 GRAM(S): 17 POWDER, FOR SOLUTION ORAL at 12:37

## 2023-06-30 RX ADMIN — Medication 100 MILLIGRAM(S): at 02:44

## 2023-06-30 RX ADMIN — PROPOFOL 3.59 MICROGRAM(S)/KG/MIN: 10 INJECTION, EMULSION INTRAVENOUS at 17:42

## 2023-06-30 RX ADMIN — TRANEXAMIC ACID 500 MILLIGRAM(S): 100 INJECTION, SOLUTION INTRAVENOUS at 06:19

## 2023-06-30 RX ADMIN — Medication 3 MILLILITER(S): at 05:36

## 2023-06-30 RX ADMIN — TRANEXAMIC ACID 500 MILLIGRAM(S): 100 INJECTION, SOLUTION INTRAVENOUS at 00:00

## 2023-06-30 RX ADMIN — PIPERACILLIN AND TAZOBACTAM 200 GRAM(S): 4; .5 INJECTION, POWDER, LYOPHILIZED, FOR SOLUTION INTRAVENOUS at 12:36

## 2023-06-30 RX ADMIN — PROPOFOL 3.59 MICROGRAM(S)/KG/MIN: 10 INJECTION, EMULSION INTRAVENOUS at 06:23

## 2023-06-30 RX ADMIN — PROPOFOL 3.59 MICROGRAM(S)/KG/MIN: 10 INJECTION, EMULSION INTRAVENOUS at 23:49

## 2023-06-30 RX ADMIN — Medication 3 MILLILITER(S): at 17:30

## 2023-06-30 RX ADMIN — SODIUM CHLORIDE 1000 MILLILITER(S): 9 INJECTION, SOLUTION INTRAVENOUS at 21:09

## 2023-06-30 RX ADMIN — PIPERACILLIN AND TAZOBACTAM 25 GRAM(S): 4; .5 INJECTION, POWDER, LYOPHILIZED, FOR SOLUTION INTRAVENOUS at 14:22

## 2023-06-30 RX ADMIN — CISATRACURIUM BESYLATE 10.8 MICROGRAM(S)/KG/MIN: 2 INJECTION INTRAVENOUS at 06:35

## 2023-06-30 NOTE — PROGRESS NOTE ADULT - ASSESSMENT
· Assessment    71yo Cantonese-speaking F PMH HTN, HLD, CAD s/p bioAVR/CABG (11/2018 SVG-RCA, Catalan Inspiris 21mm), endocarditis (lifelong penicillin), AF on warfarin, AAA, PVD, bronchiectasis, presented 6/28 w/ worsening cough w/ hemoptysis, now s/p ENT scope showing no bleed source. Also s/p Watchman 6/28 and worsening hemoptysis, transferred to MICU 6/29. Intubated 6/29 and s/p bronch showing blood-filled R lung c/f bleeding bronchial artery.    Neuro:  #orientation  AOx3 prior to sedation  Now intubated and sedated after bronchoscopy showed blood-filled R lung, rest of plan for lungs as below.  - on propofol, fentanyl, nimbex gtt overnight    Cardiovascular:   #CAD s/p CABG  Known, takes ASA 81mg qd and Atorvastatin 20mg qhs at home.  - per CTS on ASA 81mg qd and Plavix 75mg qd while inpatient  - place NGT and continue ASA + Plavix  - resume Atorvastatin once extubated    #AF  #S/p AVR  #S/p Watchman  Known h/o AF, takes Warfarin at home. Not on any rate or rhythm-control agents at home. S/p watchman placement 6/28 w/ CTS.  - currently in NSR  - per CTS on ASA 81mg qd and Plavix 75mg qd while inpatient  - place NGT and continue ASA + Plavix    Respiratory:  #hemoptysis  Presented w/ complaints of hemoptysis, s/p scope w/ ENT showing no active bleeding site. Per pt has worsened over past 1-2 days in the hospital. Now s/p intubation and bronchoscopy showing blood in R lung concerning for bleeding bronchial artery in RLL.  - ordered CTA chest to eval for active bleed, follow up read and call IR re possible intervention  - tranexamic acid nebs q8h standing  - no suctioning given risk of worsening bleeding  - repeat bronchoscopy in AM    #COPD  #Bronchiectasis  Known, uses Albuterol 90mcg/inh 2 puffs q4h PRN and Breo Ellipta 100/25 mcg/INH at home  - holding breathing treatments for now given pt is intubated, resume once extubated    GI:  No active issues    Renal:  No active issues, stable renal function    Endocrine:  No active issues    Hematologic:  #hemoptysis  Source possibly RLL bronchial artery seen through bronchoscopy, pending CTA to confirm. Hgb stable.  - plan re hemoptysis as above under pulm    ID:  #endocarditis  Known chronic endocarditis, on life long Penicillin   - Cefazolin 2g q8h x5 doses (from OR after Watchman)  - plan to resume penicillin 4mil u q6h on 6/30 after last dose of cefazolin    #leukocytosis  New leukocytosis after Watchman procedure and w/ hemoptysis, therefore may be reactive 2/2 given no other signs of new infection.  - trend WBCs, send infectious work up if concern for new infection    Psych:  #depression/anxiety  Takes Buspirone 7.5mg qd and Seroquel 25mg qd at home.  - holding while intubated, continue meds once extubated or once NGT placed    PPX  F: none  E: replenish PRN  N: NPO for now while intubated, then dash/tlc diet  GI ppx: protonix 40mg IV qd while intubated  DVT ppx: holding pharmacologic ppx for now given hemoptysis; SCDs  Code status: FULL CODE  Dispo: 9[facility]-->MICU       · Assessment  73yo Cantonese-speaking F PMH HTN, HLD, CAD s/p bioAVR/CABG (11/2018 SVG-RCA, Catalan Inspiris 21mm), endocarditis (lifelong penicillin), AF on warfarin, AAA, PVD, bronchiectasis, presented 6/28 w/ worsening cough w/ hemoptysis, now s/p ENT scope showing no bleed source. Also s/p Watchman 6/28 and worsening hemoptysis, transferred to MICU 6/29. Intubated 6/29 and s/p bronch showing blood-filled R lung.    Neuro:  #orientation  AOx3 prior to sedation  Now intubated and sedated after bronchoscopy showed blood-filled R lung, rest of plan for lungs as below.  - on propofol, fentanyl, nimbex     Cardiovascular:   #CAD s/p CABG  Known, takes ASA 81mg qd and Atorvastatin 20mg qhs at home.  - per CTS on ASA 81mg qd and Plavix 75mg qd while inpatient  - NGT today and continue ASA + Plavix  - resume Atorvastatin once extubated    #AF  #S/p AVR  #S/p Watchman  Known h/o AF, takes Warfarin at home. Not on any rate or rhythm-control agents at home. S/p watchman placement 6/28 w/ CTS.  - currently in NSR  - per CTS on ASA 81mg qd and Plavix 75mg qd while inpatient  - place NGT and continue ASA + Plavix    Respiratory:  #hemoptysis  Presented w/ complaints of hemoptysis, s/p scope w/ ENT showing no active bleeding site. Per pt has worsened over past 1-2 days in the hospital. Now s/p intubation and bronchoscopy 6/29 showing blood in R lung concerning for bleeding bronchial artery in RLL.  - No active bleed on CTA chest, no IR intervention at this time   - tranexamic acid nebs q8h standing  - no suctioning given risk of worsening bleeding  - bronchoscopy repeated this morning: no active bleed visualized, RUL, RML, RLL revealed copious amounts of mucopurulent secretions which were suctioned and sent for bronchial wash. Slight oozing on left side.    #COPD  #Bronchiectasis  Known, uses Albuterol 90mcg/inh 2 puffs q4h PRN and Breo Ellipta 100/25 mcg/INH at home  - holding breathing treatments for now given pt is intubated, resume once extubated  - Bleeding resolved on repeat bronchoscopy this AM. Copious mucopurulent secretions on right side were aspirated and sent to lab for culture and further analysis. Per pulm, patients symptoms warrants treatment for possible bronchiectasis exacerbation as etiology to her heomoptysis.    Plan per pulm recs:   - continue sedation and paralysis for next 24 hours   - repeat bronchoscopy tomorrow 7/1; if no evidence of bleeding then, cease paralytics and wean sedation   - c/w TXA nebs q8 hrs for total of 48 hours   - start antibiotics with antipseudomonal activity for bronchiectasis exacerbation  - avoid suctioning through ETT   - call pulmonary STAT for any changes     GI:  No active issues    Renal:  No active issues, stable renal function    Endocrine:  No active issues    Hematologic:  #hemoptysis  - no bleed seen on CTA. Hgb stable.  - follow pulm recs    ID:  #endocarditis  Known chronic endocarditis, on life long Penicillin   - Cefazolin 2g q8h x5 doses (from OR after Watchman)  - started on Zosyn today    #leukocytosis  New leukocytosis after Watchman procedure and w/ hemoptysis, therefore may be reactive 2/2 given no other signs of new infection.  - WBC today 16.60, continue to trend  - f/u cultures  - will start antipseudomonal abx    Psych:  #depression/anxiety  Takes Buspirone 7.5mg qd and Seroquel 25mg qd at home.  - continue meds once extubated or once NGT placed    PPX  F: none  E: replenish PRN  N: NPO for now while intubated, then dash/tlc diet  GI ppx: protonix 40mg IV qd while intubated  DVT ppx: holding pharmacologic ppx for now given hemoptysis; SCDs  Code status: FULL CODE  Dispo: 9[facility]-->MICU

## 2023-06-30 NOTE — PROGRESS NOTE ADULT - SUBJECTIVE AND OBJECTIVE BOX
Patient discussed on morning rounds with Dr. Cervantes    OPERATION & DATE: Watchman device 6/28    HOSPITAL COURSE    71 y/o Female with PMHx of HTN, HLD, CAD s/p bioAVR/CABG with Dr. Mitul Ly (11/2018 SVG-RCA, Catalan Inspiris 21mm), hx of Endocarditis on lifelong penicillin, AFib, AAA, PVD, presented with bronchiectasis worsening cough with hemoptysis on warfarin which has been stopped, referred by Dr. Mitul Gonzalez to Dr. Nubia Minor for pLAAo evaluation. Cantonese speaking,  was used. Patient underwent CT Heart LA protocol today. Reports no hemopytsis while coumadin is off. Denies CP, SOB, palpitations, orthopnea, LE edema. Patient seen in same day holding area; Reports no changes to PMHx or medications since last seen by our team. Pt underwent watchman device placement 6/28. Immediately post op developed some bradycardia and hypotension which resolved after she emerged from anesthesia. Pt developed some hemoptysis post op, likely related to elevated PTT. POD 1 pt still with hemoptysis today, which she states has gotten worse since yesterday, coags now WNL. Seen by pulm who borught pt to MICU for bronch. Pt with blood in lungs, IR unable to embolize. CTA chest did not reveal source of bleed.     VITAL SIGNS:  Vital Signs Last 24 Hrs  T(C): 38.6 (30 Jun 2023 14:36), Max: 38.8 (30 Jun 2023 05:00  T(F): 101.5 (30 Jun 2023 14:36), Max: 101.8 (30 Jun 2023 05:00)  HR: 77 (30 Jun 2023 14:00) (47 - 104)  BP: 106/52 (30 Jun 2023 14:00) (89/53 - 230/100)  BP(mean): 75 (30 Jun 2023 14:00) (66 - 142)  RR: 12 (30 Jun 2023 14:00) (12 - 39)  SpO2: 99% (30 Jun 2023 14:00) (96% - 100%)    Parameters below as of 30 Jun 2023 14:00  Patient On (Oxygen Delivery Method): ventilator    O2 Concentration (%): 35  I&O's Detail    29 Jun 2023 07:01  -  30 Jun 2023 07:00  --------------------------------------------------------  IN:    Cisatracurium: 131.8 mL    FentaNYL: 180 mL    Norepinephrine: 81.9 mL    Propofol: 99 mL    Propofol: 128 mL    sodium chloride 0.9%: 50 mL  Total IN: 670.7 mL    OUT:    Intermittent Catheterization - Urethral (mL): 600 mL    Other (mL): 20 mL    Voided (mL): 1000 mL  Total OUT: 1620 mL    Total NET: -949.3 mL      30 Jun 2023 07:01  -  30 Jun 2023 14:50  --------------------------------------------------------  IN:    Cisatracurium: 107 mL    Enteral Tube Flush: 120 mL    FentaNYL: 41.9 mL    FentaNYL: 24 mL    IV PiggyBack: 150 mL    Norepinephrine: 76.1 mL    Propofol: 102.4 mL  Total IN: 621.4 mL    OUT:    Indwelling Catheter - Urethral (mL): 365 mL  Total OUT: 365 mL    Total NET: 256.4 mL      PHYSICAL EXAM:  General: comfortable in stretcher, intubated   Neurological: AOx3. Motor skills grossly intact  Cardiovascular: Normal S1/S2. Regular rate/rhythm. No murmurs  Respiratory: Lungs CTA bilaterally. No wheezing or rales  Gastrointestinal: +BS in all 4 quadrants. Non-distended. Soft. Non-tender  Extremities: Strength 5/5 b/l upper/lower extremities. Sensation grossly intact upper/lower extremities. No edema. No calf tenderness.  Vascular: Radial 2+bilaterally, DP 2+ b/l  Incision Sites: NA, groins soft, no longer oozing      LABS:                        11.1   16.61 )-----------( 166      ( 30 Jun 2023 05:30 )             34.0     PT/INR - ( 29 Jun 2023 02:23 )   PT: 13.6 sec;   INR: 1.14          PTT - ( 29 Jun 2023 02:23 )  PTT:35.5 sec  06-30    139  |  104  |  11  ----------------------------<  142<H>  4.0   |  24  |  0.58    Ca    7.8<L>      30 Jun 2023 05:21  Mg     2.0     06-29    TPro  6.8  /  Alb  3.3  /  TBili  1.2  /  DBili  x   /  AST  27  /  ALT  11  /  AlkPhos  77  06-30    Urinalysis Basic - ( 30 Jun 2023 05:21 )    Color: x / Appearance: x / SG: x / pH: x  Gluc: 142 mg/dL / Ketone: x  / Bili: x / Urobili: x   Blood: x / Protein: x / Nitrite: x   Leuk Esterase: x / RBC: x / WBC x   Sq Epi: x / Non Sq Epi: x / Bacteria: x      MEDICATIONS  (STANDING):  acetaminophen     Tablet .. 650 milliGRAM(s) Oral once  albuterol/ipratropium for Nebulization 3 milliLiter(s) Nebulizer every 6 hours  aspirin enteric coated 81 milliGRAM(s) Oral every 24 hours  atorvastatin 20 milliGRAM(s) Oral at bedtime  budesonide  80 MICROgram(s)/formoterol 4.5 MICROgram(s) Inhaler 2 Puff(s) Inhalation two times a day  busPIRone 7.5 milliGRAM(s) Oral <User Schedule>  chlorhexidine 0.12% Liquid 5 milliLiter(s) Oral Mucosa two times a day  chlorhexidine 2% Cloths 1 Application(s) Topical <User Schedule>  cisatracurium Infusion 3 MICROgram(s)/kG/Min (10.8 mL/Hr) IV Continuous <Continuous>  clopidogrel Tablet 75 milliGRAM(s) Oral every 24 hours  fentaNYL   Infusion 1 MICROgram(s)/kG/Hr (5.99 mL/Hr) IV Continuous <Continuous>  norepinephrine Infusion 0.05 MICROgram(s)/kG/Min (5.62 mL/Hr) IV Continuous <Continuous>  pantoprazole    Tablet 40 milliGRAM(s) Oral before breakfast  piperacillin/tazobactam IVPB.. 4.5 Gram(s) IV Intermittent every 8 hours  polyethylene glycol 3350 17 Gram(s) Oral daily  propofol Infusion 9.989 MICROgram(s)/kG/Min (3.59 mL/Hr) IV Continuous <Continuous>  QUEtiapine 25 milliGRAM(s) Oral daily  tranexamic acid Injectable for Nebulization 500 milliGRAM(s) Inhalation every 8 hours    MEDICATIONS  (PRN):    RADIOLOGY & ADDITIONAL TESTS:    < from: CT Angio Chest Aorta w/wo IV Cont (06.29.23 @ 23:06) >  IMPRESSION:  1.   No obvious vascular abnormality seen to explain hemoptysis.  2.   Patchy infiltrate in posterior right upper lobe, with reticulonodular  infiltrate in lateral right upper lobe.  3.   Scattered areas of tree-in-bud bronchiolitic changes in both lungs.  4.   Right middle lobe and lingular atelectasis with bronchiectasis.  5.   A 5 mm pulmonary nodule in the left upper lobe. For patients at low   risk  (minimal or absent history of smoking and of other known risk factors), no  routine follow-up is indicated. For patients at high risk (history of   smoking  or of other known risk factors), consider optional CT Chest at 12 months.  (Reference: Teri)  6. Ectasia of the ascending aorta.. No aortic dissection    < end of copied text >

## 2023-06-30 NOTE — DIETITIAN INITIAL EVALUATION ADULT - PERTINENT LABORATORY DATA
06-30    139  |  104  |  11  ----------------------------<  142<H>  4.0   |  24  |  0.58    Ca    7.8<L>      30 Jun 2023 05:21  Mg     2.0     06-29    TPro  6.8  /  Alb  3.3  /  TBili  1.2  /  DBili  x   /  AST  27  /  ALT  11  /  AlkPhos  77  06-30

## 2023-06-30 NOTE — PROGRESS NOTE ADULT - ATTENDING COMMENTS
HTN, CAD with h/o CABG/AVR, bronchiectasis, s/p Watchman device for AF c/b severe hemoptysis  physical as above  CTA negative  on repeat bronchoscopy bleeding is less  continue MV with sedation to RASS -4 to -5 and paralytic  restart ASA/plavix despite risk of bleeding because of device  continue bronchodilators  start empiric zosyn and follow sputum cultures

## 2023-06-30 NOTE — PROGRESS NOTE ADULT - SUBJECTIVE AND OBJECTIVE BOX
Patient is a 72y old  Female who presents with a chief complaint of Watchmen procedure (30 Jun 2023 11:00)      INTERVAL HPI/OVERNIGHT EVENTS:   No overnight events   Afebrile, hemodynamically stable     ICU Vital Signs Last 24 Hrs  T(C): 37.7 (30 Jun 2023 11:42), Max: 38.8 (30 Jun 2023 05:00)  T(F): 99.9 (30 Jun 2023 11:42), Max: 101.8 (30 Jun 2023 05:00)  HR: 70 (30 Jun 2023 12:00) (47 - 104)  BP: 117/57 (30 Jun 2023 12:00) (89/53 - 230/100)  BP(mean): 81 (30 Jun 2023 12:00) (66 - 142)  ABP: --  ABP(mean): --  RR: 12 (30 Jun 2023 12:00) (12 - 39)  SpO2: 100% (30 Jun 2023 12:00) (96% - 100%)    O2 Parameters below as of 30 Jun 2023 12:00  Patient On (Oxygen Delivery Method): ventilator    O2 Concentration (%): 35      I&O's Summary    29 Jun 2023 07:01  -  30 Jun 2023 07:00  --------------------------------------------------------  IN: 670.7 mL / OUT: 1620 mL / NET: -949.3 mL    30 Jun 2023 07:01  -  30 Jun 2023 12:18  --------------------------------------------------------  IN: 157.2 mL / OUT: 290 mL / NET: -132.8 mL      Mode: AC/ CMV (Assist Control/ Continuous Mandatory Ventilation)  RR (machine): 12  TV (machine): 400  FiO2: 35  PEEP: 5  ITime: 1  MAP: 8  PIP: 20      LABS:                        11.1   16.61 )-----------( 166      ( 30 Jun 2023 05:30 )             34.0     06-30    139  |  104  |  11  ----------------------------<  142<H>  4.0   |  24  |  0.58    Ca    7.8<L>      30 Jun 2023 05:21  Mg     2.0     06-29    TPro  6.8  /  Alb  3.3  /  TBili  1.2  /  DBili  x   /  AST  27  /  ALT  11  /  AlkPhos  77  06-30    PT/INR - ( 29 Jun 2023 02:23 )   PT: 13.6 sec;   INR: 1.14          PTT - ( 29 Jun 2023 02:23 )  PTT:35.5 sec  Urinalysis Basic - ( 30 Jun 2023 05:21 )    Color: x / Appearance: x / SG: x / pH: x  Gluc: 142 mg/dL / Ketone: x  / Bili: x / Urobili: x   Blood: x / Protein: x / Nitrite: x   Leuk Esterase: x / RBC: x / WBC x   Sq Epi: x / Non Sq Epi: x / Bacteria: x      CAPILLARY BLOOD GLUCOSE        ABG - ( 30 Jun 2023 00:41 )  pH, Arterial: 7.43  pH, Blood: x     /  pCO2: 38    /  pO2: 113   / HCO3: 25    / Base Excess: 1.0   /  SaO2: 99.4                RADIOLOGY & ADDITIONAL TESTS:    Consultant(s) Notes Reviewed:  [x ] YES  [ ] NO    MEDICATIONS  (STANDING):  albuterol/ipratropium for Nebulization 3 milliLiter(s) Nebulizer every 6 hours  aspirin enteric coated 81 milliGRAM(s) Oral every 24 hours  atorvastatin 20 milliGRAM(s) Oral at bedtime  budesonide  80 MICROgram(s)/formoterol 4.5 MICROgram(s) Inhaler 2 Puff(s) Inhalation two times a day  busPIRone 7.5 milliGRAM(s) Oral <User Schedule>  chlorhexidine 0.12% Liquid 5 milliLiter(s) Oral Mucosa two times a day  chlorhexidine 2% Cloths 1 Application(s) Topical <User Schedule>  cisatracurium Infusion 3 MICROgram(s)/kG/Min (10.8 mL/Hr) IV Continuous <Continuous>  clopidogrel Tablet 75 milliGRAM(s) Oral every 24 hours  fentaNYL   Infusion 1 MICROgram(s)/kG/Hr (5.99 mL/Hr) IV Continuous <Continuous>  norepinephrine Infusion 0.05 MICROgram(s)/kG/Min (5.62 mL/Hr) IV Continuous <Continuous>  pantoprazole    Tablet 40 milliGRAM(s) Oral before breakfast  piperacillin/tazobactam IVPB. 4.5 Gram(s) IV Intermittent once  piperacillin/tazobactam IVPB.- 4.5 Gram(s) IV Intermittent once  piperacillin/tazobactam IVPB.. 4.5 Gram(s) IV Intermittent every 8 hours  polyethylene glycol 3350 17 Gram(s) Oral daily  propofol Infusion 9.989 MICROgram(s)/kG/Min (3.59 mL/Hr) IV Continuous <Continuous>  QUEtiapine 25 milliGRAM(s) Oral daily  tranexamic acid Injectable for Nebulization 500 milliGRAM(s) Inhalation every 8 hours    MEDICATIONS  (PRN):      PHYSICAL EXAM:  GENERAL:   HEAD:  Atraumatic, Normocephalic  EYES: EOMI, PERRLA, conjunctiva and sclera clear  NECK: Supple, No JVD, Normal thyroid, no enlarged nodes  NERVOUS SYSTEM:  Alert & Awake.   CHEST/LUNG: B/L good air entry; No rales, rhonchi, or wheezing  HEART: S1S2 normal, no S3, Regular rate and rhythm; No murmurs  ABDOMEN: Soft, Nontender, Nondistended; Bowel sounds present  EXTREMITIES:  2+ Peripheral Pulses, No clubbing, cyanosis, or edema  LYMPH: No lymphadenopathy noted  SKIN: No rashes or lesions    Care Discussed with Consultants/Other Providers [ x] YES  [ ] NO Patient is a 72y old  Female who presents with a chief complaint of hemoptysis after Watchmen procedure (30 Jun 2023 11:00)    Overnight: Tmax 101/8 this AM, blood cultures sent and given IV tylenol. 600 cc UOP via straight cath.    ICU Vital Signs Last 24 Hrs  T(C): 37.7 (30 Jun 2023 11:42), Max: 38.8 (30 Jun 2023 05:00)  T(F): 99.9 (30 Jun 2023 11:42), Max: 101.8 (30 Jun 2023 05:00)  HR: 70 (30 Jun 2023 12:00) (47 - 104)  BP: 117/57 (30 Jun 2023 12:00) (89/53 - 230/100)  BP(mean): 81 (30 Jun 2023 12:00) (66 - 142)  ABP: --  ABP(mean): --  RR: 12 (30 Jun 2023 12:00) (12 - 39)  SpO2: 100% (30 Jun 2023 12:00) (96% - 100%)    O2 Parameters below as of 30 Jun 2023 12:00  Patient On (Oxygen Delivery Method): ventilator    O2 Concentration (%): 35    I&O's Summary    29 Jun 2023 07:01  -  30 Jun 2023 07:00  --------------------------------------------------------  IN: 670.7 mL / OUT: 1620 mL / NET: -949.3 mL    30 Jun 2023 07:01  -  30 Jun 2023 12:18  --------------------------------------------------------  IN: 157.2 mL / OUT: 290 mL / NET: -132.8 mL      Mode: AC/ CMV (Assist Control/ Continuous Mandatory Ventilation)  RR (machine): 12  TV (machine): 400  FiO2: 35  PEEP: 5  ITime: 1  MAP: 8  PIP: 20    LABS:                        11.1   16.61 )-----------( 166      ( 30 Jun 2023 05:30 )             34.0     06-30    139  |  104  |  11  ----------------------------<  142<H>  4.0   |  24  |  0.58    Ca    7.8<L>      30 Jun 2023 05:21  Mg     2.0     06-29    TPro  6.8  /  Alb  3.3  /  TBili  1.2  /  DBili  x   /  AST  27  /  ALT  11  /  AlkPhos  77  06-30    PT/INR - ( 29 Jun 2023 02:23 )   PT: 13.6 sec;   INR: 1.14          PTT - ( 29 Jun 2023 02:23 )  PTT:35.5 sec  Urinalysis Basic - ( 30 Jun 2023 05:21 )    Color: x / Appearance: x / SG: x / pH: x  Gluc: 142 mg/dL / Ketone: x  / Bili: x / Urobili: x   Blood: x / Protein: x / Nitrite: x   Leuk Esterase: x / RBC: x / WBC x   Sq Epi: x / Non Sq Epi: x / Bacteria: x    CAPILLARY BLOOD GLUCOSE    ABG - ( 30 Jun 2023 00:41 )  pH, Arterial: 7.43  pH, Blood: x     /  pCO2: 38    /  pO2: 113   / HCO3: 25    / Base Excess: 1.0   /  SaO2: 99.4      RADIOLOGY & ADDITIONAL TESTS:    Consultant(s) Notes Reviewed:  [x ] YES  [ ] NO    MEDICATIONS  (STANDING):  albuterol/ipratropium for Nebulization 3 milliLiter(s) Nebulizer every 6 hours  aspirin enteric coated 81 milliGRAM(s) Oral every 24 hours  atorvastatin 20 milliGRAM(s) Oral at bedtime  budesonide  80 MICROgram(s)/formoterol 4.5 MICROgram(s) Inhaler 2 Puff(s) Inhalation two times a day  busPIRone 7.5 milliGRAM(s) Oral <User Schedule>  chlorhexidine 0.12% Liquid 5 milliLiter(s) Oral Mucosa two times a day  chlorhexidine 2% Cloths 1 Application(s) Topical <User Schedule>  cisatracurium Infusion 3 MICROgram(s)/kG/Min (10.8 mL/Hr) IV Continuous <Continuous>  clopidogrel Tablet 75 milliGRAM(s) Oral every 24 hours  fentaNYL   Infusion 1 MICROgram(s)/kG/Hr (5.99 mL/Hr) IV Continuous <Continuous>  norepinephrine Infusion 0.05 MICROgram(s)/kG/Min (5.62 mL/Hr) IV Continuous <Continuous>  pantoprazole    Tablet 40 milliGRAM(s) Oral before breakfast  piperacillin/tazobactam IVPB. 4.5 Gram(s) IV Intermittent once  piperacillin/tazobactam IVPB.- 4.5 Gram(s) IV Intermittent once  piperacillin/tazobactam IVPB.. 4.5 Gram(s) IV Intermittent every 8 hours  polyethylene glycol 3350 17 Gram(s) Oral daily  propofol Infusion 9.989 MICROgram(s)/kG/Min (3.59 mL/Hr) IV Continuous <Continuous>  QUEtiapine 25 milliGRAM(s) Oral daily  tranexamic acid Injectable for Nebulization 500 milliGRAM(s) Inhalation every 8 hours    MEDICATIONS  (PRN):    PHYSICAL EXAM:  GENERAL: Sedated, intubated  HEAD:  Atraumatic, Normocephalic  EYES: pinpoint pupils  NECK: Supple, No JVD, no enlarged nodes  NERVOUS SYSTEM:  intubated, sedated and paralyzed   CHEST/LUNG: decreased breath sounds in RLL; No rales, rhonchi, or wheezing  HEART: Prominent S2; Systolic murmur in 2nd Left ICS; murmur in 5th Right ICS  ABDOMEN: Soft, Nontender, Nondistended; Bowel sounds present  EXTREMITIES:  2+ Peripheral Pulses, No clubbing, cyanosis, or edema  LYMPH: No lymphadenopathy noted  SKIN: No rashes or lesions    Care Discussed with Consultants/Other Providers [ x] YES  [ ] NO

## 2023-06-30 NOTE — DISCHARGE NOTE NURSING/CASE MANAGEMENT/SOCIAL WORK - PATIENT PORTAL LINK FT
You can access the FollowMyHealth Patient Portal offered by [facility] by registering at the following website: [email or web address]. By joining [facility]’s FollowMyHealth portal, you will also be able to view your health information using other applications (apps) compatible with our system.

## 2023-06-30 NOTE — PROCEDURE NOTE - NSICDXPROCEDURE_GEN_ALL_CORE_FT
PROCEDURES:  Arterial puncture 30-Jun-2023 00:43:27  Mamadou Heller  
PROCEDURES:  Insertion, nasogastric tube 30-Jun-2023 11:02:55  Gerald Machuca

## 2023-06-30 NOTE — CONSULT NOTE ADULT - SUBJECTIVE AND OBJECTIVE BOX
pt is intubated, sedated, unable to provide history  history from chart     73 y/o Female with PMHx of HTN, HLD, CAD s/p bioAVR/CABG with Dr. Mitul Ly (11/2018 SVG-RCA, Catalan Inspiris 21mm), hx of Endocarditis on lifelong penicillin, AFib, AAA, PVD, presented with bronchiectasis worsening cough with hemoptysis on warfarin which has been stopped, referred by Dr. Mitul Gonzalez to Dr. Nubia Minor for pLAAo evaluation. Patient underwent CT Heart LA protocol . Reports no hemopytsis while coumadin is off. Pt underwent watchman device placement 6/28. Immediately post op developed some bradycardia and hypotension which resolved after she emerged from anesthesia. Pt developed some hemoptysis post op, likely related to elevated PTT. POD 1 pt still with hemoptysis - which she states has gotten worse, coags now WNL. Seen by pulm who borught pt to MICU for bronch. Pt with blood in lungs, IR unable to embolize. CTA chest did not reveal source of bleed-intubated, sedated, started on antibiotics coverage for pseudomonas ( bronchiectasis )   pt seen in micu  intubated, sedated, paralysed, on pressor for hypotension.    PAST MEDICAL & SURGICAL HISTORY:  Atrial fibrillation      History of bronchiectasis      CAD (coronary artery disease)      Hyperlipidemia      HTN (hypertension)      History of peripheral vascular disease      Rheumatic aortic disease      History of endocarditis      History of hemoptysis      Elective surgery  bioAVR/CABG      Elective surgery  SVG-RCA 11/2018        Home Medications:  Albuterol (Eqv-ProAir HFA) 90 mcg/inh inhalation aerosol: 2 inhaled 4 times a day as needed for  shortness of breath and/or wheezing (28 Jun 2023 07:34)  amLODIPine 2.5 mg oral tablet: 1 orally (28 Jun 2023 07:13)  aspirin 81 mg oral tablet: 1 orally once a day (28 Jun 2023 07:13)  breo ellipta: 100-25 mcg/INH aepb (28 Jun 2023 07:13)  busPIRone 5 mg oral tablet: 1.5 tab(s) orally (28 Jun 2023 07:34)  clonazePAM 0.5 mg oral tablet: 1 orally (28 Jun 2023 07:13)  Lipitor 20 mg oral tablet: 1 orally once a day (28 Jun 2023 07:34)  pantoprazole 40 mg oral delayed release tablet: 1 orally once a day (28 Jun 2023 07:34)  Seroquel 25 mg oral tablet: 1 orally (28 Jun 2023 07:34)    Allergies    Bactrim (Other)  Shrimp (Unknown)  sulfa drugs (Unknown)  Lobster (Unknown)  unknown (Ototoxicity)    Intolerances      FAMILY HISTORY: unable to obtain    Social History: unable to obtain      REVIEW OF SYSTEMS:  12 points system reviewed all negative except in [place].    Diet, NPO (06-29-23 @ 19:42) [Active]      CURRENT MEDICATIONS:   acetaminophen     Tablet .. 650 milliGRAM(s) Oral once  albuterol/ipratropium for Nebulization 3 milliLiter(s) Nebulizer every 6 hours  aspirin enteric coated 81 milliGRAM(s) Oral every 24 hours  atorvastatin 20 milliGRAM(s) Oral at bedtime  budesonide  80 MICROgram(s)/formoterol 4.5 MICROgram(s) Inhaler 2 Puff(s) Inhalation two times a day  busPIRone 7.5 milliGRAM(s) Oral <User Schedule>  chlorhexidine 0.12% Liquid 5 milliLiter(s) Oral Mucosa two times a day  chlorhexidine 2% Cloths 1 Application(s) Topical <User Schedule>  cisatracurium Infusion 3 MICROgram(s)/kG/Min IV Continuous <Continuous>  clopidogrel Tablet 75 milliGRAM(s) Oral every 24 hours  fentaNYL   Infusion 1 MICROgram(s)/kG/Hr IV Continuous <Continuous>  norepinephrine Infusion 0.05 MICROgram(s)/kG/Min IV Continuous <Continuous>  pantoprazole    Tablet 40 milliGRAM(s) Oral before breakfast  piperacillin/tazobactam IVPB.. 4.5 Gram(s) IV Intermittent every 8 hours  polyethylene glycol 3350 17 Gram(s) Oral daily  propofol Infusion 9.989 MICROgram(s)/kG/Min IV Continuous <Continuous>  QUEtiapine 25 milliGRAM(s) Oral daily  tranexamic acid Injectable for Nebulization 500 milliGRAM(s) Inhalation every 8 hours      VITAL SIGNS, INS/OUTS (last 24 hours):  Vital Signs Last 24 Hrs  T(C): 38.9 (30 Jun 2023 16:00), Max: 38.9 (30 Jun 2023 16:00)  T(F): 102 (30 Jun 2023 16:00), Max: 102 (30 Jun 2023 16:00)  HR: 68 (30 Jun 2023 17:29) (47 - 90)  BP: 97/49 (30 Jun 2023 17:00) (75/39 - 185/85)  BP(mean): 70 (30 Jun 2023 17:00) (52 - 122)  RR: 12 (30 Jun 2023 17:00) (12 - 39)  SpO2: 99% (30 Jun 2023 17:29) (97% - 100%)    Parameters below as of 30 Jun 2023 17:29  Patient On (Oxygen Delivery Method): ventilator      I&O's Summary    29 Jun 2023 07:01  -  30 Jun 2023 07:00  --------------------------------------------------------  IN: 670.7 mL / OUT: 1620 mL / NET: -949.3 mL    30 Jun 2023 07:01  -  30 Jun 2023 17:52  --------------------------------------------------------  IN: 877.1 mL / OUT: 425 mL / NET: 452.1 mL        PHYSICAL EXAM:  General exam: intubated, full sedation.  CVS : IRIR  Pulm: transmitted sound from vent.  Abd:  BS present, soft, nt, not distended.  no edema noted on lower ext.    BASIC LABS:                        11.1   16.61 )-----------( 166      ( 30 Jun 2023 05:30 )             34.0     06-30    139  |  104  |  11  ----------------------------<  142<H>  4.0   |  24  |  0.58    Ca    7.8<L>      30 Jun 2023 05:21  Mg     2.0     06-29    TPro  6.8  /  Alb  3.3  /  TBili  1.2  /  DBili  x   /  AST  27  /  ALT  11  /  AlkPhos  77  06-30    PT/INR - ( 29 Jun 2023 02:23 )   PT: 13.6 sec;   INR: 1.14          PTT - ( 29 Jun 2023 02:23 )  PTT:35.5 sec  Urinalysis Basic - ( 30 Jun 2023 05:21 )    Color: x / Appearance: x / SG: x / pH: x  Gluc: 142 mg/dL / Ketone: x  / Bili: x / Urobili: x   Blood: x / Protein: x / Nitrite: x   Leuk Esterase: x / RBC: x / WBC x   Sq Epi: x / Non Sq Epi: x / Bacteria: x      CAPILLARY BLOOD GLUCOSE          OTHER LABS:        MICRODATA:    Culture - Bronchial (collected 29 Jun 2023 08:42)  Source: .Bronchial Left lung bronchial wash  Gram Stain (30 Jun 2023 16:54):    No epithelial cells seen    Moderate WBC's    Few Gram Negative Rods    Culture - Bronchial (collected 29 Jun 2023 08:42)  Source: .Bronchial RML BAL  Gram Stain (30 Jun 2023 16:55):    No epithelial cells seen    Moderate WBC's    Few-moderate Gram Negative Rods      #Diet - Diet, NPO (06-29-23 @ 19:42) [Active]        #DVT PPx -

## 2023-06-30 NOTE — DISCHARGE NOTE NURSING/CASE MANAGEMENT/SOCIAL WORK - NSDCPEFALRISK_GEN_ALL_CORE
For information on Fall & Injury Prevention, visit: [email or web address] OR  [email or web address] OR  https://www.cdc.gov/steadi/patient.html

## 2023-06-30 NOTE — PROGRESS NOTE ADULT - SUBJECTIVE AND OBJECTIVE BOX
PULMONARY CONSULT SERVICE FOLLOW-UP NOTE    INTERVAL HPI:  Reviewed chart and overnight events; patient seen and examined at bedside. Underwent CTA last night which did not reveal any active bleed, no intervention performed by IR. Febrile to nearly 102, pan cultured. No issues with oxygenation or vent settings overnight. Unable to obtain ROS. For repeat inspectional bronchoscopy this morning.     MEDICATIONS:  Pulmonary:  albuterol/ipratropium for Nebulization 3 milliLiter(s) Nebulizer every 6 hours  budesonide  80 MICROgram(s)/formoterol 4.5 MICROgram(s) Inhaler 2 Puff(s) Inhalation two times a day    Antimicrobials:  piperacillin/tazobactam IVPB. 4.5 Gram(s) IV Intermittent once  piperacillin/tazobactam IVPB.- 4.5 Gram(s) IV Intermittent once  piperacillin/tazobactam IVPB.. 4.5 Gram(s) IV Intermittent every 8 hours    Anticoagulants:  aspirin enteric coated 81 milliGRAM(s) Oral every 24 hours  clopidogrel Tablet 75 milliGRAM(s) Oral every 24 hours  tranexamic acid Injectable for Nebulization 500 milliGRAM(s) Inhalation every 8 hours    Cardiac:  norepinephrine Infusion 0.05 MICROgram(s)/kG/Min IV Continuous <Continuous>      Allergies    Bactrim (Other)  Shrimp (Unknown)  sulfa drugs (Unknown)  Lobster (Unknown)  unknown (Ototoxicity)    Intolerances        Vital Signs Last 24 Hrs  T(C): 38.8 (30 Jun 2023 05:00), Max: 38.8 (30 Jun 2023 05:00)  T(F): 101.8 (30 Jun 2023 05:00), Max: 101.8 (30 Jun 2023 05:00)  HR: 63 (30 Jun 2023 10:00) (47 - 104)  BP: 95/49 (30 Jun 2023 10:00) (89/53 - 230/100)  BP(mean): 70 (30 Jun 2023 10:00) (66 - 142)  RR: 12 (30 Jun 2023 10:00) (12 - 39)  SpO2: 98% (30 Jun 2023 10:00) (96% - 100%)    Parameters below as of 30 Jun 2023 10:00  Patient On (Oxygen Delivery Method): ventilator    O2 Concentration (%): 35    06-29 @ 07:01  -  06-30 @ 07:00  --------------------------------------------------------  IN: 670.7 mL / OUT: 1620 mL / NET: -949.3 mL    06-30 @ 07:01  -  06-30 @ 11:01  --------------------------------------------------------  IN: 157.2 mL / OUT: 290 mL / NET: -132.8 mL      Mode: AC/ CMV (Assist Control/ Continuous Mandatory Ventilation)  RR (machine): 12  TV (machine): 400  FiO2: 35  PEEP: 5  ITime: 1  MAP: 8  PIP: 20      PHYSICAL EXAM:  Constitutional: intubated, sedated and paralyzed  HEENT: NC/AT; PERRL, anicteric sclera; MMM  Neck: supple  Cardiovascular: +S1/S2, RRR; sternotomy scar   Respiratory: CTA B/L; no W/R/R  Gastrointestinal: soft, NT/ND  Extremities: WWP; no edema, clubbing or cyanosis  Vascular: 2+ radial pulses B/L  Neurological: intubated, sedated and paralyzed    LABS:  ABG - ( 30 Jun 2023 00:41 )  pH, Arterial: 7.43  pH, Blood: x     /  pCO2: 38    /  pO2: 113   / HCO3: 25    / Base Excess: 1.0   /  SaO2: 99.4                CBC Full  -  ( 30 Jun 2023 05:30 )  WBC Count : 16.61 K/uL  RBC Count : 3.67 M/uL  Hemoglobin : 11.1 g/dL  Hematocrit : 34.0 %  Platelet Count - Automated : 166 K/uL  Mean Cell Volume : 92.6 fl  Mean Cell Hemoglobin : 30.2 pg  Mean Cell Hemoglobin Concentration : 32.6 gm/dL  Auto Neutrophil # : x  Auto Lymphocyte # : x  Auto Monocyte # : x  Auto Eosinophil # : x  Auto Basophil # : x  Auto Neutrophil % : x  Auto Lymphocyte % : x  Auto Monocyte % : x  Auto Eosinophil % : x  Auto Basophil % : x    06-30    139  |  104  |  11  ----------------------------<  142<H>  4.0   |  24  |  0.58    Ca    7.8<L>      30 Jun 2023 05:21  Mg     2.0     06-29    TPro  6.8  /  Alb  3.3  /  TBili  1.2  /  DBili  x   /  AST  27  /  ALT  11  /  AlkPhos  77  06-30    PT/INR - ( 29 Jun 2023 02:23 )   PT: 13.6 sec;   INR: 1.14          PTT - ( 29 Jun 2023 02:23 )  PTT:35.5 sec      Urinalysis Basic - ( 30 Jun 2023 05:21 )    Color: x / Appearance: x / SG: x / pH: x  Gluc: 142 mg/dL / Ketone: x  / Bili: x / Urobili: x   Blood: x / Protein: x / Nitrite: x   Leuk Esterase: x / RBC: x / WBC x   Sq Epi: x / Non Sq Epi: x / Bacteria: x                RADIOLOGY & ADDITIONAL STUDIES:

## 2023-06-30 NOTE — PROGRESS NOTE ADULT - ASSESSMENT
73 y/o Female with PMHx of HTN, HLD, CAD s/p bioAVR/CABG with Dr. Mitul Ly (11/2018 SVG-RCA, Catalan Inspiris 21mm), hx of Endocarditis on lifelong penicillin, AFib, AAA, PVD, presented with bronchiectasis presented for watchmen procedure. Pulmonary consulted for hemoptysis.     #Hemoptysis   #?Bronchiectasis     Seen and examined at bedside. She is having persistent hemoptysis despite being off AC without an ENT source identified - previously with hsitory of epistaxis, but no evidence of this on bedside scope by ENT today. No history to suggest GI source as her hematocrit has been stable - typically bleeds with a pulmonary/bronchial origin do not have exsanguination and this is consistent with that history thus far. SHe has a questionable history of bronchiectasis - on her scan, it is not overly impressive; but this can commonly be a cause of bronchial bleeding. There is oropharyngeal trauma on exam (possible as a combination of trauma from intubation and from YARIEL), but no active bleeding in the oropharynx. Given persistent hemoptysis with clots, she warrants bronchoscopic evaluation.     CTA without evidence of active bleeding. On repeat inspectional bronchoscopy this AM, her bleeding has resolved. SHe had copious mucopurulent secretions which were therapeutically aspirated and sent to the lab for culture and further analysis. This finding, in addition to her new fever overnight as well as her bronchiectasis, warrants treatment for possible bronchiectasis exacerbation as etiology to her heomoptysis. Would require drugs with anti pseudomonal activity given bronchiectasis.    Recommend:   - would keep her under deep sedation and paralysis for next 24 hours   - repeat inspectional bronchoscopy tomorrow 7/1; if no evidence of bleeding then, would cease paralytics and start to wean sedation   - c/w TXA nebs q8 hrs for total of 48 hours   - would start antibiotics with antipseudomonal activity   - avoid suctioning through ETT   - please call pulmonary STAT for any changes     S/E/D with Dr. Marie

## 2023-06-30 NOTE — PROCEDURE NOTE - NSBRONCHFINDINGS_GEN_A_CORE_FT
Airway evaluation revealed Sharp Anais. SUKHJINDER and LLL evaluation revealed old blood which was suctioned - on examination after suctioning no oozing or active bleeding visualized. RUL, RML, RLL revealed copious amounts of bright red blood. After suctioning, continued oozing noted coming from RLL along medial and anterior subsegments. No endobronchial lesions or active vessel identified. Cold saline and epinephrine with lidocaine injected into RLL. ETT noted to be in good position. Bronchoscope then withdrawn from ETT.
Airway evaluation revealed Sharp Anais. SUKHJINDER and LLL evaluation revealed old blood in the LLL, posterior subsegment, which was suctioned - on examination after suctioning, slight oozing visualized. RUL, RML, RLL revealed copious amounts of mucopurulent secretions which were suctioned and sent for bronchial wash. No active bleeding identified from right side. BAL performed of RML with 40 cc of cold saline and 15 cc return. Cold saline and epinephrine with lidocaine injected into RLL and area of visualized oozing in LLL. ETT noted to be in good position. Bronchoscope then withdrawn from ETT.

## 2023-06-30 NOTE — PROCEDURE NOTE - NSICDXIRPREOP_GEN_A_CORE_FT
PRE-OP DIAGNOSIS:  Major hemoptysis 29-Jun-2023 19:43:40  Sudheer Champion  
PRE-OP DIAGNOSIS:  Major hemoptysis 29-Jun-2023 19:43:40  Sudheer Champion

## 2023-06-30 NOTE — PROCEDURE NOTE - NSBRONCHHISTORY_GEN_A_CORE_FT
73 y/o Female with PMHx of HTN, HLD, CAD s/p bioAVR/CABG with Dr. Mitul Ly (11/2018 SVG-RCA, Catalan Inspiris 21mm), hx of Endocarditis on lifelong penicillin, AFib, AAA, PVD, presented with bronchiectasis worsening cough with hemoptysis on warfarin which has been stopped, referred by Dr. Mitul Gonzalez to Dr. Nubia Minor for pLAAo evaluation. Cantonese speaking,  was used. Patient underwent CT Heart LA protocol today. Reports no hemopytsis while coumadin is off. Denies CP, SOB, palpitations, orthopnea, LE edema. Patient seen in same day holding area; Reports no changes to PMHx or medications since last seen by our team. Pt underwent watchman device placement 6/28. Immediately post op developed some bradycardia and hypotension which resolved after she emerged from anesthesia. Pt developed some hemoptysis post op, likely related to elevated PTT. POD 1 pt still with hemoptysis today, which she states has gotten worse since yesterday, coags now WNL. Pulmonary called for evaluation of massive hemoptysis. Underwent bronchoscopy 6/29 with evidence of massive hemoptysis. For repeat inspection bronchoscopy today
71 y/o Female with PMHx of HTN, HLD, CAD s/p bioAVR/CABG with Dr. Mitul Ly (11/2018 SVG-RCA, Catalan Inspiris 21mm), hx of Endocarditis on lifelong penicillin, AFib, AAA, PVD, presented with bronchiectasis worsening cough with hemoptysis on warfarin which has been stopped, referred by Dr. Mitul Gonzalez to Dr. Nubia Minor for pLAAo evaluation. Cantonese speaking,  was used. Patient underwent CT Heart LA protocol today. Reports no hemopytsis while coumadin is off. Denies CP, SOB, palpitations, orthopnea, LE edema. Patient seen in same day holding area; Reports no changes to PMHx or medications since last seen by our team. Pt underwent watchman device placement 6/28. Immediately post op developed some bradycardia and hypotension which resolved after she emerged from anesthesia. Pt developed some hemoptysis post op, likely related to elevated PTT. POD 1 pt still with hemoptysis today, which she states has gotten worse since yesterday, coags now WNL. Pulmonary called for evaluation of massive hemoptysis

## 2023-06-30 NOTE — DIETITIAN INITIAL EVALUATION ADULT - OTHER INFO
1 y/o Female with PMHx of HTN, HLD, CAD s/p bioAVR/CABG with Dr. Mitul Ly (11/2018 SVG-RCA, Catalan Inspiris 21mm), hx of Endocarditis on lifelong penicillin, AFib (on Coumadin), AAA, PVD, presented with bronchiectasis worsening cough with hemoptysis on warfarin which has been stopped, referred by Dr. Mitul Gonzalez to Dr. Nubia Minor for pLAAo evaluation. Admit under Dr. Minor  via same day surgery. Consent signed, placed on chart.  Risks/benefits reviewed, patient understands and agrees. T&S ordered and blood products placed on hold for OR.  To ICU   post-op.     Pt care discussed in IDT rounds. Rx and labs reviewed. Pt intubated and sedated at time of assessment; vent to VC-AC, MAP 77, fentanyl gtt, norepinephrine gtt, and propofol @14.4 ml/hr (380 kcal/day). Pt is s/p watchman procedure 6/28; pt stepped up to MICU post-operatively. Bronchoscopy showing blood in R lung yesterday, L lung today. Plan to place Dobhoff tube and initiate enteral nutrition support; see recs below. No other reports GI distress or further nutritional concerns at this time. RDN will continue to reassess, intervene, and monitor as appropriate.     Pain: 0 per chart review   GI: Abdomen non-distended/non-tender, +BS x4, last bowel movement PTA  Skin: Post-surgical incision, no edema noted

## 2023-06-30 NOTE — PROCEDURE NOTE - NSICDXIRPOSTOP_GEN_A_CORE_FT
POST-OP DIAGNOSIS:  Major hemoptysis 29-Jun-2023 19:43:50  Sudheer Champion  
POST-OP DIAGNOSIS:  Major hemoptysis 29-Jun-2023 19:43:50  Sudheer Champion

## 2023-06-30 NOTE — DIETITIAN INITIAL EVALUATION ADULT - PERTINENT MEDS FT
MEDICATIONS  (STANDING):  acetaminophen     Tablet .. 650 milliGRAM(s) Oral once  albuterol/ipratropium for Nebulization 3 milliLiter(s) Nebulizer every 6 hours  aspirin enteric coated 81 milliGRAM(s) Oral every 24 hours  atorvastatin 20 milliGRAM(s) Oral at bedtime  budesonide  80 MICROgram(s)/formoterol 4.5 MICROgram(s) Inhaler 2 Puff(s) Inhalation two times a day  busPIRone 7.5 milliGRAM(s) Oral <User Schedule>  chlorhexidine 0.12% Liquid 5 milliLiter(s) Oral Mucosa two times a day  chlorhexidine 2% Cloths 1 Application(s) Topical <User Schedule>  cisatracurium Infusion 3 MICROgram(s)/kG/Min (10.8 mL/Hr) IV Continuous <Continuous>  clopidogrel Tablet 75 milliGRAM(s) Oral every 24 hours  fentaNYL   Infusion 1 MICROgram(s)/kG/Hr (5.99 mL/Hr) IV Continuous <Continuous>  norepinephrine Infusion 0.05 MICROgram(s)/kG/Min (5.62 mL/Hr) IV Continuous <Continuous>  pantoprazole    Tablet 40 milliGRAM(s) Oral before breakfast  piperacillin/tazobactam IVPB.. 4.5 Gram(s) IV Intermittent every 8 hours  polyethylene glycol 3350 17 Gram(s) Oral daily  propofol Infusion 9.989 MICROgram(s)/kG/Min (3.59 mL/Hr) IV Continuous <Continuous>  QUEtiapine 25 milliGRAM(s) Oral daily  tranexamic acid Injectable for Nebulization 500 milliGRAM(s) Inhalation every 8 hours    MEDICATIONS  (PRN):

## 2023-06-30 NOTE — PROGRESS NOTE ADULT - ASSESSMENT
71 y/o Female with PMHx of HTN, HLD, CAD s/p bioAVR/CABG with Dr. Mitul Ly (11/2018 SVG-RCA, Catalan Inspiris 21mm), hx of Endocarditis on lifelong penicillin, AFib, AAA, PVD, presented with bronchiectasis worsening cough with hemoptysis on warfarin which has been stopped, referred by Dr. Mitul Gonzalez to Dr. Nubia Minor for pLAAo evaluation. Cantonese speaking,  was used. Patient underwent CT Heart LA protocol today. Reports no hemopytsis while coumadin is off. Denies CP, SOB, palpitations, orthopnea, LE edema. Patient seen in same day holding area; Reports no changes to PMHx or medications since last seen by our team. Pt underwent watchman device placement 6/28. Immediately post op developed some bradycardia and hypotension which resolved after she emerged from anesthesia. Pt developed some hemoptysis post op, likely related to elevated PTT. POD 1 pt still with hemoptysis today, which she states has gotten worse since yesterday, coags now WNL. Seen by pulm who borught pt to MICU for bronch. Pt with blood in lungs, IR unable to embolize. CTA chest did not reveal source of bleed. Transferred to medicine service, CTS will continue to follow.     Plan:  Problem 1: hemoptysis  -plan for bronch tomorrow  -continue to monitor   -pt to remain intubated      Problem 2: s/p watchman device  -c/w ASA plavix unless actively bleeding  -continue to monitor for arrhythmia       Problem 3: HLD  -c/w lipitor        I have reviewed clinical labs tests and reports, radiology tests and reports, as well as old patient medical records, and discussed with the refering physician.

## 2023-06-30 NOTE — DIETITIAN INITIAL EVALUATION ADULT - ADD RECOMMEND
- Initiate nutrition within 24-48 hrs    *With current propofol rate, recommend Jevity 1.2 at trophic rate of 20 ml/hr    *When propofol weaned, recommend Jevity 1.2 @56 ml/hr x18hrs with LPS x2/day to provide 1008 ml tube feed, 1410 kcal, 86 gProt., and 813 ml free water. This is 17.7 non-protein kcal and 1.44 gProt. per kg current body weight 59.9 kg.   -Monitor pressor and propofol demands; adjust tube feed as necessary    *Monitor serum triglycerides weekly while on propofol   -Maintain aspiration precautions at all times  -Monitor ability to extubate and initiate oral diet   -Align nutrition with GOC at all times   -Monitor chemistry, GI function, and skin integrity

## 2023-06-30 NOTE — CONSULT NOTE ADULT - ASSESSMENT
71 y/o Female with PMHx of HTN, HLD, CAD s/p bioAVR/CABG with Dr. Mitul Ly (11/2018 SVG-RCA, Catalan Inspiris 21mm), hx of Endocarditis on lifelong penicillin, AFib, AAA, PVD, presented with bronchiectasis worsening cough with hemoptysis on warfarin which has been stopped, referred by Dr. Mitul Gonzalez to Dr. Nubia Minor for pLAAo evaluation. Patient underwent CT Heart LA protocol . Reports no hemopytsis while coumadin is off. Pt underwent watchman device placement 6/28. Immediately post op developed some bradycardia and hypotension which resolved after she emerged from anesthesia. Pt developed some hemoptysis post op, likely related to elevated PTT. POD 1 pt still with hemoptysis - which she states has gotten worse, coags now WNL. Seen by pulm who borught pt to MICU for bronch. Pt with blood in lungs, IR unable to embolize. CTA chest did not reveal source of bleed-intubated, sedated, started on antibiotics coverage for pseudomonas ( bronchiectasis )     - hemoptysis , presumed exacerbation of Bronchiectasis -intubated for airway, sedation/paralysis to suppress cough - antibiotics for pseudomonal coverage  - hypotension -likely sepsis with shock, on pressors/antibiotics  - AFib sp watchman device-   - CAD spbioavr/cabg   - hx of endocarditis on lifelong penicillin  - HTN ( home med amlodipine 2.5 held  - Hyperlipidemia ( lipitor 20 daily  -buspirone 5 mg daily , seroquel 25 daily , clonazepam 0.5 mg .  ppi   - home inhaler is proaif, breo ellipta.  - CTS /micu care appreciated. plan for repeat bronch in am.  will follow. [facility] structural heart Dr. Minor.   family contact :  Joseph Vazquez [phone] --   spoke with son and  , questions answered  Son requested that team contact family with update and when she wake up.

## 2023-06-30 NOTE — PROGRESS NOTE ADULT - ATTENDING COMMENTS
Massive hemoptysis, likely 2/2 systemic anticoagulation and underlying bronchiectasis, s/p bronch inspection yesteraday w/ RLL likely source. Reviewed CTA, no obvious GGO or evidence of bleeding, no further overt bleeding overnight while on TXA, paralyzed, full vent support. Repeated bronchoscopy today, purulent sputum removed from RLL but no active bleeding.  Mild oozing now noted in LLL, minimal active bleeding. Sputum cultures sent in setting of new fevers. BAL performed of RML. Would continue 48 hrs of TXA nebulizers around the clock, continue on full vent support and repeat bronch tomorrow. Suspect multiple areas of bleeding in setting of anticoaguation, will need to clarify w/ EP as she may not be able to tolerate A/C and DAPT.     Reviewed CTA, labs, medications, overnight events. See full bronch report for bronch findings/details.  Decision making of the highest complexity.

## 2023-07-01 LAB
ALBUMIN SERPL ELPH-MCNC: 3.1 G/DL — LOW (ref 3.3–5)
ALP SERPL-CCNC: 80 U/L — SIGNIFICANT CHANGE UP (ref 40–120)
ALT FLD-CCNC: 12 U/L — SIGNIFICANT CHANGE UP (ref 10–45)
ANION GAP SERPL CALC-SCNC: 10 MMOL/L — SIGNIFICANT CHANGE UP (ref 5–17)
AST SERPL-CCNC: 21 U/L — SIGNIFICANT CHANGE UP (ref 10–40)
B PERT IGG+IGM PNL SER: SIGNIFICANT CHANGE UP
BASOPHILS # BLD AUTO: 0 K/UL — SIGNIFICANT CHANGE UP (ref 0–0.2)
BASOPHILS NFR BLD AUTO: 0 % — SIGNIFICANT CHANGE UP (ref 0–2)
BILIRUB SERPL-MCNC: 1.4 MG/DL — HIGH (ref 0.2–1.2)
BLD GP AB SCN SERPL QL: NEGATIVE — SIGNIFICANT CHANGE UP
BUN SERPL-MCNC: 6 MG/DL — LOW (ref 7–23)
CALCIUM SERPL-MCNC: 7.7 MG/DL — LOW (ref 8.4–10.5)
CHLORIDE SERPL-SCNC: 100 MMOL/L — SIGNIFICANT CHANGE UP (ref 96–108)
CO2 SERPL-SCNC: 25 MMOL/L — SIGNIFICANT CHANGE UP (ref 22–31)
COLOR FLD: SIGNIFICANT CHANGE UP
CREAT SERPL-MCNC: 0.56 MG/DL — SIGNIFICANT CHANGE UP (ref 0.5–1.3)
EGFR: 96 ML/MIN/1.73M2 — SIGNIFICANT CHANGE UP
EOSINOPHIL # BLD AUTO: 0.54 K/UL — HIGH (ref 0–0.5)
EOSINOPHIL NFR BLD AUTO: 3.5 % — SIGNIFICANT CHANGE UP (ref 0–6)
FLUID INTAKE SUBSTANCE CLASS: SIGNIFICANT CHANGE UP
GIANT PLATELETS BLD QL SMEAR: PRESENT — SIGNIFICANT CHANGE UP
GLUCOSE SERPL-MCNC: 208 MG/DL — HIGH (ref 70–99)
GRAM STN FLD: SIGNIFICANT CHANGE UP
GRAM STN FLD: SIGNIFICANT CHANGE UP
HCT VFR BLD CALC: 37.1 % — SIGNIFICANT CHANGE UP (ref 34.5–45)
HGB BLD-MCNC: 12.1 G/DL — SIGNIFICANT CHANGE UP (ref 11.5–15.5)
LYMPHOCYTES # BLD AUTO: 0.94 K/UL — LOW (ref 1–3.3)
LYMPHOCYTES # BLD AUTO: 6.1 % — LOW (ref 13–44)
LYMPHOCYTES # FLD: 1 % — SIGNIFICANT CHANGE UP
MANUAL SMEAR VERIFICATION: SIGNIFICANT CHANGE UP
MCHC RBC-ENTMCNC: 30.5 PG — SIGNIFICANT CHANGE UP (ref 27–34)
MCHC RBC-ENTMCNC: 32.6 GM/DL — SIGNIFICANT CHANGE UP (ref 32–36)
MCV RBC AUTO: 93.5 FL — SIGNIFICANT CHANGE UP (ref 80–100)
MONOCYTES # BLD AUTO: 0.94 K/UL — HIGH (ref 0–0.9)
MONOCYTES NFR BLD AUTO: 6.1 % — SIGNIFICANT CHANGE UP (ref 2–14)
MONOS+MACROS # FLD: 1 % — SIGNIFICANT CHANGE UP
NEUTROPHILS # BLD AUTO: 13.03 K/UL — HIGH (ref 1.8–7.4)
NEUTROPHILS NFR BLD AUTO: 83.5 % — HIGH (ref 43–77)
NEUTROPHILS-BODY FLUID: 98 % — SIGNIFICANT CHANGE UP
NEUTS BAND # BLD: 0.8 % — SIGNIFICANT CHANGE UP (ref 0–8)
NIGHT BLUE STAIN TISS: SIGNIFICANT CHANGE UP
NIGHT BLUE STAIN TISS: SIGNIFICANT CHANGE UP
OVALOCYTES BLD QL SMEAR: SLIGHT — SIGNIFICANT CHANGE UP
PLAT MORPH BLD: ABNORMAL
PLATELET # BLD AUTO: 146 K/UL — LOW (ref 150–400)
POIKILOCYTOSIS BLD QL AUTO: SLIGHT — SIGNIFICANT CHANGE UP
POTASSIUM SERPL-MCNC: 3.4 MMOL/L — LOW (ref 3.5–5.3)
POTASSIUM SERPL-SCNC: 3.4 MMOL/L — LOW (ref 3.5–5.3)
PROT SERPL-MCNC: 7 G/DL — SIGNIFICANT CHANGE UP (ref 6–8.3)
RBC # BLD: 3.97 M/UL — SIGNIFICANT CHANGE UP (ref 3.8–5.2)
RBC # FLD: 13.2 % — SIGNIFICANT CHANGE UP (ref 10.3–14.5)
RBC BLD AUTO: ABNORMAL
RCV VOL RI: HIGH /UL (ref 0–0)
RH IG SCN BLD-IMP: POSITIVE — SIGNIFICANT CHANGE UP
SODIUM SERPL-SCNC: 135 MMOL/L — SIGNIFICANT CHANGE UP (ref 135–145)
SPECIMEN SOURCE FLD: SIGNIFICANT CHANGE UP
SPECIMEN SOURCE: SIGNIFICANT CHANGE UP
SPHEROCYTES BLD QL SMEAR: SLIGHT — SIGNIFICANT CHANGE UP
TOTAL NUCLEATED CELL COUNT, BODY FLUID: SIGNIFICANT CHANGE UP /UL
TUBE TYPE: SIGNIFICANT CHANGE UP
WBC # BLD: 15.46 K/UL — HIGH (ref 3.8–10.5)
WBC # FLD AUTO: 15.46 K/UL — HIGH (ref 3.8–10.5)

## 2023-07-01 PROCEDURE — 99291 CRITICAL CARE FIRST HOUR: CPT | Mod: 25

## 2023-07-01 PROCEDURE — 31624 DX BRONCHOSCOPE/LAVAGE: CPT

## 2023-07-01 PROCEDURE — 99233 SBSQ HOSP IP/OBS HIGH 50: CPT

## 2023-07-01 PROCEDURE — 99233 SBSQ HOSP IP/OBS HIGH 50: CPT | Mod: GC

## 2023-07-01 RX ORDER — DEXMEDETOMIDINE HYDROCHLORIDE IN 0.9% SODIUM CHLORIDE 4 UG/ML
0.1 INJECTION INTRAVENOUS
Qty: 400 | Refills: 0 | Status: DISCONTINUED | OUTPATIENT
Start: 2023-07-01 | End: 2023-07-02

## 2023-07-01 RX ORDER — FENTANYL CITRATE 50 UG/ML
0.5 INJECTION INTRAVENOUS
Qty: 2500 | Refills: 0 | Status: DISCONTINUED | OUTPATIENT
Start: 2023-07-01 | End: 2023-07-02

## 2023-07-01 RX ORDER — ACETAMINOPHEN 500 MG
650 TABLET ORAL ONCE
Refills: 0 | Status: COMPLETED | OUTPATIENT
Start: 2023-07-01 | End: 2023-07-01

## 2023-07-01 RX ORDER — POTASSIUM CHLORIDE 20 MEQ
20 PACKET (EA) ORAL ONCE
Refills: 0 | Status: COMPLETED | OUTPATIENT
Start: 2023-07-01 | End: 2023-07-01

## 2023-07-01 RX ORDER — POTASSIUM CHLORIDE 20 MEQ
40 PACKET (EA) ORAL ONCE
Refills: 0 | Status: COMPLETED | OUTPATIENT
Start: 2023-07-01 | End: 2023-07-01

## 2023-07-01 RX ORDER — ACETAMINOPHEN 500 MG
1000 TABLET ORAL ONCE
Refills: 0 | Status: COMPLETED | OUTPATIENT
Start: 2023-07-01 | End: 2023-07-01

## 2023-07-01 RX ORDER — PROPOFOL 10 MG/ML
9.99 INJECTION, EMULSION INTRAVENOUS
Qty: 1000 | Refills: 0 | Status: DISCONTINUED | OUTPATIENT
Start: 2023-07-01 | End: 2023-07-02

## 2023-07-01 RX ORDER — POTASSIUM CHLORIDE 20 MEQ
20 PACKET (EA) ORAL
Refills: 0 | Status: DISCONTINUED | OUTPATIENT
Start: 2023-07-01 | End: 2023-07-01

## 2023-07-01 RX ADMIN — DEXMEDETOMIDINE HYDROCHLORIDE IN 0.9% SODIUM CHLORIDE 1.5 MICROGRAM(S)/KG/HR: 4 INJECTION INTRAVENOUS at 15:48

## 2023-07-01 RX ADMIN — PROPOFOL 3.59 MICROGRAM(S)/KG/MIN: 10 INJECTION, EMULSION INTRAVENOUS at 05:40

## 2023-07-01 RX ADMIN — CHLORHEXIDINE GLUCONATE 5 MILLILITER(S): 213 SOLUTION TOPICAL at 05:54

## 2023-07-01 RX ADMIN — Medication 81 MILLIGRAM(S): at 05:55

## 2023-07-01 RX ADMIN — PANTOPRAZOLE SODIUM 40 MILLIGRAM(S): 20 TABLET, DELAYED RELEASE ORAL at 06:01

## 2023-07-01 RX ADMIN — PROPOFOL 3.59 MICROGRAM(S)/KG/MIN: 10 INJECTION, EMULSION INTRAVENOUS at 21:20

## 2023-07-01 RX ADMIN — PIPERACILLIN AND TAZOBACTAM 25 GRAM(S): 4; .5 INJECTION, POWDER, LYOPHILIZED, FOR SOLUTION INTRAVENOUS at 21:18

## 2023-07-01 RX ADMIN — CHLORHEXIDINE GLUCONATE 5 MILLILITER(S): 213 SOLUTION TOPICAL at 17:42

## 2023-07-01 RX ADMIN — CLOPIDOGREL BISULFATE 75 MILLIGRAM(S): 75 TABLET, FILM COATED ORAL at 11:47

## 2023-07-01 RX ADMIN — TRANEXAMIC ACID 500 MILLIGRAM(S): 100 INJECTION, SOLUTION INTRAVENOUS at 12:18

## 2023-07-01 RX ADMIN — POLYETHYLENE GLYCOL 3350 17 GRAM(S): 17 POWDER, FOR SOLUTION ORAL at 11:47

## 2023-07-01 RX ADMIN — Medication 40 MILLIEQUIVALENT(S): at 09:03

## 2023-07-01 RX ADMIN — Medication 3 MILLILITER(S): at 09:24

## 2023-07-01 RX ADMIN — Medication 7.5 MILLIGRAM(S): at 06:04

## 2023-07-01 RX ADMIN — SODIUM CHLORIDE 75 MILLILITER(S): 9 INJECTION, SOLUTION INTRAVENOUS at 11:47

## 2023-07-01 RX ADMIN — Medication 20 MILLIEQUIVALENT(S): at 11:47

## 2023-07-01 RX ADMIN — Medication 3 MILLILITER(S): at 21:22

## 2023-07-01 RX ADMIN — PIPERACILLIN AND TAZOBACTAM 25 GRAM(S): 4; .5 INJECTION, POWDER, LYOPHILIZED, FOR SOLUTION INTRAVENOUS at 13:11

## 2023-07-01 RX ADMIN — FENTANYL CITRATE 5.99 MICROGRAM(S)/KG/HR: 50 INJECTION INTRAVENOUS at 17:42

## 2023-07-01 RX ADMIN — PROPOFOL 3.59 MICROGRAM(S)/KG/MIN: 10 INJECTION, EMULSION INTRAVENOUS at 17:41

## 2023-07-01 RX ADMIN — PROPOFOL 3.59 MICROGRAM(S)/KG/MIN: 10 INJECTION, EMULSION INTRAVENOUS at 11:56

## 2023-07-01 RX ADMIN — Medication 400 MILLIGRAM(S): at 07:05

## 2023-07-01 RX ADMIN — TRANEXAMIC ACID 500 MILLIGRAM(S): 100 INJECTION, SOLUTION INTRAVENOUS at 05:51

## 2023-07-01 RX ADMIN — Medication 400 MILLIGRAM(S): at 18:59

## 2023-07-01 RX ADMIN — Medication 3 MILLILITER(S): at 16:03

## 2023-07-01 RX ADMIN — TRANEXAMIC ACID 500 MILLIGRAM(S): 100 INJECTION, SOLUTION INTRAVENOUS at 21:22

## 2023-07-01 RX ADMIN — Medication 3 MILLILITER(S): at 03:27

## 2023-07-01 RX ADMIN — PIPERACILLIN AND TAZOBACTAM 25 GRAM(S): 4; .5 INJECTION, POWDER, LYOPHILIZED, FOR SOLUTION INTRAVENOUS at 06:01

## 2023-07-01 RX ADMIN — CHLORHEXIDINE GLUCONATE 1 APPLICATION(S): 213 SOLUTION TOPICAL at 05:59

## 2023-07-01 RX ADMIN — Medication 650 MILLIGRAM(S): at 13:29

## 2023-07-01 RX ADMIN — CISATRACURIUM BESYLATE 10.8 MICROGRAM(S)/KG/MIN: 2 INJECTION INTRAVENOUS at 05:40

## 2023-07-01 RX ADMIN — ATORVASTATIN CALCIUM 20 MILLIGRAM(S): 80 TABLET, FILM COATED ORAL at 21:17

## 2023-07-01 NOTE — PROCEDURAL SAFETY CHECKLIST WITH OR WITHOUT SEDATION - NSTEAMPROVIDERFT_GEN_ALL_CORE
He should continue the acyclovir but can stop the allopurinol    Per MPM    I called and spoke to patient and made him aware.  No further questions at this time.   
Patient was here to see MPM today.  He forgot to ask if he should continue the Acyclovir and the allopurinol?  Since he is likely not getting anymore chemo.     Per Dr. Easley:  I think we can collect him now and go forward with transplant without third cycle if cleared at transplant clearance meeting due next week. Thanks      I will touch base with MPM to make sure if he wants him to continue or D/C.  
Sudheer Thompson
Dr Rayshawn Blake
Dr Sudheer Champion

## 2023-07-01 NOTE — PROGRESS NOTE ADULT - ASSESSMENT
-71 y/o Female with PMHx of HTN, HLD, CAD s/p bioAVR/CABG with Dr. Mitul Ly (11/2018 SVG-RCA, Catalan Inspiris 21mm), hx of Endocarditis on lifelong penicillin, AFib, AAA, PVD, presented with bronchiectasis presented for watchmen procedure. Pulmonary consulted for hemoptysis.     #Hemoptysis   #?Bronchiectasis     Seen and examined at bedside. She is having persistent hemoptysis despite being off AC without an ENT source identified - previously with hsitory of epistaxis, but no evidence of this on bedside scope by ENT today. No history to suggest GI source as her hematocrit has been stable - typically bleeds with a pulmonary/bronchial origin do not have exsanguination and this is consistent with that history thus far. SHe has a questionable history of bronchiectasis - on her scan, it is not overly impressive; but this can commonly be a cause of bronchial bleeding. There is oropharyngeal trauma on exam (possible as a combination of trauma from intubation and from YARIEL), but no active bleeding in the oropharynx. Given persistent hemoptysis with clots, she warrants bronchoscopic evaluation.     CTA without evidence of active bleeding. On repeat inspectional bronchoscopy this AM, her bleeding has resolved. SHe had copious mucopurulent secretions which were therapeutically aspirated and sent to the lab for culture and further analysis. This finding, in addition to her new fever overnight as well as her bronchiectasis, warrants treatment for possible bronchiectasis exacerbation as etiology to her heomoptysis. Would require drugs with anti pseudomonal activity given bronchiectasis.    Easily friable tissue during bronchoscopic evaluation, with purulent secretions, no visible bleed.    Recommend:   - agree with stopping paralysis and proceeding with cpap trial  - c/w TXA nebs q8 hrs for total of 48 hours   - antibiotics with antipseudomonal activity

## 2023-07-01 NOTE — PROGRESS NOTE ADULT - ASSESSMENT
71 y/o Female with PMHx of HTN, HLD, CAD s/p bioAVR/CABG with Dr. Mitul Ly (11/2018 SVG-RCA, Catalan Inspiris 21mm), hx of Endocarditis on lifelong penicillin, AFib, AAA, PVD, presented with bronchiectasis worsening cough with hemoptysis on warfarin which has been stopped, referred by Dr. Mitul Gonzalez to Dr. Nubia Minor for pLAAo evaluation. Patient underwent CT Heart LA protocol . Reports no hemopytsis while coumadin is off. Pt underwent watchman device placement 6/28. Immediately post op developed some bradycardia and hypotension which resolved after she emerged from anesthesia. Pt developed some hemoptysis post op, likely related to elevated PTT. POD 1 pt still with hemoptysis - which she states has gotten worse, coags now WNL. Seen by pulm who borught pt to MICU for bronch. Pt with blood in lungs, IR unable to embolize. CTA chest did not reveal source of bleed-intubated, sedated, started on antibiotics coverage for pseudomonas ( bronchiectasis )     - hemoptysis,  exacerbation of Bronchiectasis   -intubated for airway protection, sedation/paralysis to suppress cough   - antibiotics for pseudomonal coverage- on Zosyn, wbc 15 on 7/1-  - Hb stable   - BAL -gram negative rods  - repeat Bronch 7/1- no active bleeding seen, frail tissues/ inflammation as dw pulmonary   - hypotension -likely sepsis with shock, on pressors/antibiotics  - AFib sp watchman device- on ASA and plavix  - CAD spbioavr/cabg   - hx of endocarditis on lifelong penicillin  - HTN ( home med amlodipine 2.5 held  - Hyperlipidemia ( lipitor 20 daily  - hx of Anxiety  - buspirone 5 mg daily , seroquel 25 daily , clonazepam 0.5 mg .  ppi   - home inhaler is proaif, breo ellipta.    will follow. [facility] structural heart Dr. Minor.   family contact :  Joseph Vazquez [phone] --   care by jacquelin/pulmonary  appreciated, vanessa pul, son updated. vanessa RN -to call family when plan to wean the sedation/weaning from vent- she has hx of anxiety , family concerned she would be very anxious not to see them around, son could be reach by  phone

## 2023-07-01 NOTE — PROGRESS NOTE ADULT - ASSESSMENT
Assessment and Plan:   · Assessment	  · Assessment  71yo Cantonese-speaking F PMH HTN, HLD, CAD s/p bioAVR/CABG (11/2018 SVG-RCA, [employer] Inspiris 21mm), endocarditis (lifelong penicillin), AF on warfarin, AAA, PVD, bronchiectasis, presented 6/28 w/ worsening cough w/ hemoptysis, now s/p ENT scope showing no bleed source. Also s/p Watchman 6/28 and worsening hemoptysis, transferred to MICU 6/29. Intubated 6/29 and s/p bronch showing blood-filled R lung.    Neuro:  #orientation  AOx3 prior to sedation  Now intubated and sedated after bronchoscopy showed blood-filled R lung, rest of plan for lungs as below.  - on propofol, fentanyl, DC nimbex     Cardiovascular:   #CAD s/p CABG  Known, takes ASA 81mg qd and Atorvastatin 20mg qhs at home.  - per CTS on ASA 81mg qd and Plavix 75mg qd while inpatient  - NGT today and continue ASA + Plavix  - resume Atorvastatin once extubated    #AF  #S/p AVR  #S/p Watchman  Known h/o AF, takes Warfarin at home. Not on any rate or rhythm-control agents at home. S/p watchman placement 6/28 w/ CTS.  - currently in NSR  - per CTS on ASA 81mg qd and Plavix 75mg qd while inpatient  - place NGT and continue ASA + Plavix    Respiratory:  #hemoptysis  Presented w/ complaints of hemoptysis, s/p scope w/ ENT showing no active bleeding site. Per pt has worsened over past 1-2 days in the hospital. Now s/p intubation and bronchoscopy 6/29 showing blood in R lung concerning for bleeding bronchial artery in RLL.  - No active bleed on CTA chest, no IR intervention at this time   - tranexamic acid nebs q8h standing  - no suctioning given risk of worsening bleeding  - bronchoscopy repeated this morning: no active bleed visualized, RUL, RML, RLL revealed copious amounts of mucopurulent secretions which were suctioned and sent for bronchial wash. Slight oozing on left side.  - failed CPAP trial    #COPD  #Bronchiectasis  Known, uses Albuterol 90mcg/inh 2 puffs q4h PRN and Breo Ellipta 100/25 mcg/INH at home  - holding breathing treatments for now given pt is intubated, resume once extubated  - Bleeding resolved on repeat bronchoscopy this AM. Copious mucopurulent secretions on right side were aspirated and sent to lab for culture and further analysis. Per pulm, patients symptoms warrants treatment for possible bronchiectasis exacerbation as etiology to her heomoptysis.    Plan per pulm recs:   - continue sedation and paralysis for next 24 hours   - repeat bronchoscopy tomorrow 7/1; if no evidence of bleeding then, cease paralytics and wean sedation   - c/w TXA nebs q8 hrs for total of 48 hours   - start antibiotics with antipseudomonal activity for bronchiectasis exacerbation  - avoid suctioning through ETT   - call pulmonary STAT for any changes     GI:  No active issues    Renal:  No active issues, stable renal function    Endocrine:  No active issues    Hematologic:  #hemoptysis  - no bleed seen on CTA. Hgb stable.  - follow pulm recs    ID:  #endocarditis  Known chronic endocarditis, on life long Penicillin   - Cefazolin 2g q8h x5 doses (from OR after Watchman)  - started on Zosyn     #leukocytosis  New leukocytosis after Watchman procedure and w/ hemoptysis, therefore may be reactive 2/2 given no other signs of new infection.  - WBC today 16.60, continue to trend  - f/u cultures  -  started antipseudomonal abx    Psych:  #depression/anxiety  Takes Buspirone 7.5mg qd and Seroquel 25mg qd at home.  - continue meds once extubated or once NGT placed    PPX  F: none  E: replenish PRN  N: NPO for now while intubated, then dash/tlc diet  GI ppx: protonix 40mg IV qd while intubated  DVT ppx: holding pharmacologic ppx for now given hemoptysis; SCDs  Code status: FULL CODE  Dispo: 9LA-->MICU

## 2023-07-01 NOTE — PROCEDURAL SAFETY CHECKLIST WITH OR WITHOUT SEDATION - NSTEAMATTENDINGFT_GEN_ALL_CORE
Physical Therapy  Visit Type: treatment  Precautions:  Medical precautions:  fall risk; standard precautions.    s/p C2-T3 decompression and fusion on 8/30/21    Lines:     Basic: capped IV and telemetry      Lines in chart and on patient reviewed, precautions maintained throughout session.  Spine:     Cervical: hard brace on at all times, no lifting, no bending and no rotation    \"Instruct patient to avoid tasks that require repetitive upper extremity movements, pushing, pulling, prolonged overhead tasks, or lifting greater than 5 pounds; instruct patient to limit daily tasks to light duty activities.\" Review of precautions provided.  Hearing: no hearing deficits  Vision:     Visual history: corrective eye surgery, cataracts and surgical intervention (Hx cataracts, Left eye cataract surgery, Right eye scheduled 9/14)    SUBJECTIVE  Patient agreed to participate in therapy this date.  Patient / Family Goal: maximize function and return home     OBJECTIVE     Oriented to person, place, time and situation     Affect/Behavior: cooperative and pleasant  Patient activity tolerance: 1 to 1 activity to rest    Bed Mobility:        Supine to sit: modified independent    Sit to supine: modified independent  Transfers:    Assistive devices: gait belt, 4-wheeled walker and 1 person    Sit to stand: supervision    Stand to sit: supervision    Toilet: supervision  Training completed:    Tasks: sit to stand, stand to sit, stand pivot and toilet  Gait/Ambulation:     Assistance: contact guard/touching/steadying assist and stand by assist   Assistive device: gait belt, 4-wheeled walker, 1 person and none    Distance (ft): 350; 20  Training Completed:    Tasks: gait training on level surfaces            ASSESSMENT    Impairments: pain, balance deficits, safety awareness and activity tolerance  Functional Limitations: bed mobility and stair climbing  Pt tolerated session fair. Pt limited by generalized weakness. Supine to sit and 
reverse with HOB elevated use of rail with modified independence. Sit to stand and reverse from edge of bed, 2x's, toilet with supervision for safety. Pt ambulated about 350ft with 4ww and supervision for safety and balance. Pt ambulated about 20ft with SBA for safety and balance, mild unsteadiness not significant LOB.  Standing balance in bathroom with supervision. Will continue skilled PT per POC    Skilled therapy is required to address these limitations in attempt to maximize the patient's independence.  Progress: progressing toward goals    End of Session:   Location: in bed  Safety measures: call light within reach and alarm system in place/re-engaged  Handoff to: nurse    PLAN    Suggestions for next session as indicated: Progress independence with transfers, ambulation, stairs, monitor lightheadedness.    Frequency Comments: MANE SMITH 9/6        Interventions: balance, body mechanics, gait training, neuromuscular re-education, bed mobility, patient/family training, safety education, functional transfer training, endurance training and stairs retraining  Agreement to plan and goals: patient agrees with goals and treatment plan        GOALS  Review Date: 9/13/2021  Long Term Goals: (to be met by time of discharge from hospital)  State precautions: Patient able to state precautions independent.  Status: progressing/ongoing  Maintain precautions: Patient able to maintain precautions independent.  Status: progressing/ongoing  Rolling left: Patient will complete bed mobility for rolling left modified independent.  Status: progressing/ongoing  Rolling right: Patient will complete bed mobility for rolling right modified independent.  Status: progressing/ongoing  Sidelying to sit: Patient will complete bed mobility for sidelying to sit modified independent.  Status: progressing/ongoing  Sit to sidelying: Patient will complete bed mobility for sit to sidelying modified independent.  Status: progressing/ongoing  Sit to 
stand: Patient will complete sit to stand transfer with 4-wheeled walker, modified independent.   Status: progressing/ongoing  Stand to sit: Patient will complete stand to sit transfer with 4-wheeled walker, modified independent.   Status: progressing/ongoing  Ambulation (even): Patient will ambulate on even surface for 150 feet with 4-wheeled walker, modified independent.   Status: progressing/ongoing  Stairs: Patient will ambulate 5 steps with 4-wheeled walker using one rail.   Home program: patient independent with home program as instructed.   Status: progressing/ongoing    Documented in the chart in the following areas: Pain. Assessment.      Therapy procedure time and total treatment time can be found documented on the Time Entry flowsheet  
Ivette Oliveira
Dr Eloy Prescott
Dr Marie

## 2023-07-01 NOTE — PROCEDURE NOTE - NSBRONCHPROCDETAILS_GEN_A_CORE_FT
Indication: History of hemoptysis    Pre-op Dx: Pnuemonia    History: see progress note    Preop medication: n/a     Xray Findings: n/a    Findings:  Bronchoscope inserted through ETT. ETT noted to be in good position. Airway evaluation revealed splayed anshul, bronchiectatic airways. SUKHJINDER and LLL evaluation revealed splayed anshul ectatic airways, anatomical variant LLL RUL, RML, RLL revealed Purulent secretions in all lobes, no bleeding vessel. Easily friable tissue throughout all lung zones . Bronchoscope then withdrawn from ETT. Minimal bleeding noted    Specimens: sent for micro
Patient sedated and draped prior to procedure. Adapter connected to ETT. Ventilator set to 100% FiO2 and airway alarms adjusted. Bronchoscope inserted through ETT. Airways examined to subsegments. See below for findings. Bronchoscope removed from ETT after procedure and ventilator settings adjusted back to previous settings. 
Patient sedated and draped prior to procedure. Adapter connected to ETT. Ventilator set to 100% FiO2 and airway alarms adjusted. Bronchoscope inserted through ETT. Airways examined to subsegments. See below for findings. Bronchoscope removed from ETT after procedure and ventilator settings adjusted back to previous settings.

## 2023-07-01 NOTE — PROGRESS NOTE ADULT - SUBJECTIVE AND OBJECTIVE BOX
GERMÁN GUPTA , [id number],  [facility] [id number]    Time of encounter :  11: 30 am  had bronchoscopy done this am- no active bleeding seen, inflammation   remain sedated , intubated.  labs/imaging reviewed.        T(C): 37.7 (07-01-23 @ 10:57), Max: 38.9 (06-30-23 @ 16:00)  HR: 86 (07-01-23 @ 11:30) (64 - 86)  BP: 109/53 (07-01-23 @ 11:30) (70/46 - 147/64)  RR: 12 (07-01-23 @ 11:30) (12 - 14)  SpO2: 97% (07-01-23 @ 11:30) (94% - 100%)    albuterol/ipratropium for Nebulization 3 milliLiter(s) Nebulizer every 6 hours  aspirin  chewable 81 milliGRAM(s) Oral every 24 hours  atorvastatin 20 milliGRAM(s) Oral at bedtime  budesonide  80 MICROgram(s)/formoterol 4.5 MICROgram(s) Inhaler 2 Puff(s) Inhalation two times a day  busPIRone 7.5 milliGRAM(s) Oral <User Schedule>  chlorhexidine 0.12% Liquid 5 milliLiter(s) Oral Mucosa two times a day  chlorhexidine 2% Cloths 1 Application(s) Topical <User Schedule>  cisatracurium Infusion 3 MICROgram(s)/kG/Min IV Continuous <Continuous>  clopidogrel Tablet 75 milliGRAM(s) Oral daily  fentaNYL   Infusion 1 MICROgram(s)/kG/Hr IV Continuous <Continuous>  lactated ringers. 1000 milliLiter(s) IV Continuous <Continuous>  norepinephrine Infusion 0.05 MICROgram(s)/kG/Min IV Continuous <Continuous>  pantoprazole    Tablet 40 milliGRAM(s) Oral before breakfast  piperacillin/tazobactam IVPB.. 4.5 Gram(s) IV Intermittent every 8 hours  polyethylene glycol 3350 17 Gram(s) Oral daily  potassium chloride   Powder 20 milliEquivalent(s) Oral once  propofol Infusion 9.989 MICROgram(s)/kG/Min IV Continuous <Continuous>  QUEtiapine 25 milliGRAM(s) Oral daily  tranexamic acid Injectable for Nebulization 500 milliGRAM(s) Inhalation every 8 hours      Physical Exam : intubated , sedated  transmitted sounds from vent.  no edema noted on lower ext        CBC Full  -  ( 01 Jul 2023 05:09 )  WBC Count : 15.46 K/uL  RBC Count : 3.97 M/uL  Hemoglobin : 12.1 g/dL  Hematocrit : 37.1 %  Platelet Count - Automated : 146 K/uL  Mean Cell Volume : 93.5 fl  Mean Cell Hemoglobin : 30.5 pg  Mean Cell Hemoglobin Concentration : 32.6 gm/dL  Auto Neutrophil # : 13.03 K/uL  Auto Lymphocyte # : 0.94 K/uL  Auto Monocyte # : 0.94 K/uL  Auto Eosinophil # : 0.54 K/uL  Auto Basophil # : 0.00 K/uL  Auto Neutrophil % : 83.5 %  Auto Lymphocyte % : 6.1 %  Auto Monocyte % : 6.1 %  Auto Eosinophil % : 3.5 %  Auto Basophil % : 0.0 %        07-01    135  |  100  |  6<L>  ----------------------------<  208<H>  3.4<L>   |  25  |  0.56    Ca    7.7<L>      01 Jul 2023 05:09    TPro  7.0  /  Alb  3.1<L>  /  TBili  1.4<H>  /  DBili  x   /  AST  21  /  ALT  12  /  AlkPhos  80  07-01    Daily     Daily   CAPILLARY BLOOD GLUCOSE          Culture - Blood (collected 30 Jun 2023 05:21)  Source: .Blood Blood  Preliminary Report (01 Jul 2023 07:00):    No growth at 1 day.    Culture - Bronchial (collected 29 Jun 2023 08:42)  Source: .Bronchial Left lung bronchial wash  Gram Stain (30 Jun 2023 16:54):    No epithelial cells seen    Moderate WBC's    Few Gram Negative Rods    Culture - Bronchial (collected 29 Jun 2023 08:42)  Source: .Bronchial RML BAL  Gram Stain (30 Jun 2023 16:55):    No epithelial cells seen    Moderate WBC's    Few-moderate Gram Negative Rods      Urinalysis Basic - ( 01 Jul 2023 05:09 )    Color: x / Appearance: x / SG: x / pH: x  Gluc: 208 mg/dL / Ketone: x  / Bili: x / Urobili: x   Blood: x / Protein: x / Nitrite: x   Leuk Esterase: x / RBC: x / WBC x   Sq Epi: x / Non Sq Epi: x / Bacteria: x

## 2023-07-01 NOTE — PROGRESS NOTE ADULT - SUBJECTIVE AND OBJECTIVE BOX
OVERNIGHT EVENTS: No acute events overnight    SUBJECTIVE / INTERVAL HPI: Patient seen and examined at bedside. S/p bronchoscopy with BAL.    Review of systems negative except as noted above.     VITAL SIGNS:  Vital Signs Last 24 Hrs  T(C): 38.5 (01 Jul 2023 19:00), Max: 38.9 (01 Jul 2023 15:00)  T(F): 101.3 (01 Jul 2023 19:00), Max: 102 (01 Jul 2023 15:00)  HR: 69 (01 Jul 2023 20:00) (64 - 122)  BP: 98/52 (01 Jul 2023 20:00) (70/46 - 147/64)  BP(mean): 71 (01 Jul 2023 20:00) (52 - 96)  RR: 12 (01 Jul 2023 20:00) (12 - 26)  SpO2: 98% (01 Jul 2023 20:00) (92% - 100%)    Parameters below as of 01 Jul 2023 20:00  Patient On (Oxygen Delivery Method): ventilator    O2 Concentration (%): 35  Mode: AC/ CMV (Assist Control/ Continuous Mandatory Ventilation), RR (machine): 12, TV (machine): 400, FiO2: 35, PEEP: 5, ITime: 1, MAP: 9, PIP: 22    06-30-23 @ 07:01 - 07-01-23 @ 07:00  --------------------------------------------------------  IN: 3663.1 mL / OUT: 2370 mL / NET: 1293.1 mL    07-01-23 @ 07:01 - 07-01-23 @ 20:43  --------------------------------------------------------  IN: 2055.6 mL / OUT: 1040 mL / NET: 1015.6 mL        PHYSICAL EXAM:  See bronch note.    MEDICATIONS:  MEDICATIONS  (STANDING):  albuterol/ipratropium for Nebulization 3 milliLiter(s) Nebulizer every 6 hours  aspirin  chewable 81 milliGRAM(s) Oral every 24 hours  atorvastatin 20 milliGRAM(s) Oral at bedtime  budesonide  80 MICROgram(s)/formoterol 4.5 MICROgram(s) Inhaler 2 Puff(s) Inhalation two times a day  busPIRone 7.5 milliGRAM(s) Oral <User Schedule>  chlorhexidine 0.12% Liquid 5 milliLiter(s) Oral Mucosa two times a day  chlorhexidine 2% Cloths 1 Application(s) Topical <User Schedule>  cisatracurium Infusion 3 MICROgram(s)/kG/Min (10.8 mL/Hr) IV Continuous <Continuous>  clopidogrel Tablet 75 milliGRAM(s) Oral daily  dexMEDEtomidine Infusion 0.1 MICROgram(s)/kG/Hr (1.5 mL/Hr) IV Continuous <Continuous>  fentaNYL   Infusion. 0.5 MICROgram(s)/kG/Hr (3 mL/Hr) IV Continuous <Continuous>  lactated ringers. 1000 milliLiter(s) (75 mL/Hr) IV Continuous <Continuous>  norepinephrine Infusion 0.05 MICROgram(s)/kG/Min (5.62 mL/Hr) IV Continuous <Continuous>  pantoprazole    Tablet 40 milliGRAM(s) Oral before breakfast  piperacillin/tazobactam IVPB.. 4.5 Gram(s) IV Intermittent every 8 hours  polyethylene glycol 3350 17 Gram(s) Oral daily  propofol Infusion 9.989 MICROgram(s)/kG/Min (3.59 mL/Hr) IV Continuous <Continuous>  QUEtiapine 25 milliGRAM(s) Oral daily  tranexamic acid Injectable for Nebulization 500 milliGRAM(s) Inhalation every 8 hours    MEDICATIONS  (PRN):      ALLERGIES:  Allergies    Bactrim (Other)  Shrimp (Unknown)  sulfa drugs (Unknown)  Lobster (Unknown)  unknown (Ototoxicity)    Intolerances        LABS:                        12.1   15.46 )-----------( 146      ( 01 Jul 2023 05:09 )             37.1     07-01    135  |  100  |  6<L>  ----------------------------<  208<H>  3.4<L>   |  25  |  0.56    Ca    7.7<L>      01 Jul 2023 05:09    TPro  7.0  /  Alb  3.1<L>  /  TBili  1.4<H>  /  DBili  x   /  AST  21  /  ALT  12  /  AlkPhos  80  07-01      Urinalysis Basic - ( 01 Jul 2023 05:09 )    Color: x / Appearance: x / SG: x / pH: x  Gluc: 208 mg/dL / Ketone: x  / Bili: x / Urobili: x   Blood: x / Protein: x / Nitrite: x   Leuk Esterase: x / RBC: x / WBC x   Sq Epi: x / Non Sq Epi: x / Bacteria: x      CAPILLARY BLOOD GLUCOSE              RADIOLOGY & ADDITIONAL TESTS: Reviewed.

## 2023-07-01 NOTE — PROGRESS NOTE ADULT - SUBJECTIVE AND OBJECTIVE BOX
INTERVAL HPI/OVERNIGHT EVENTS:    SUBJECTIVE: Patient seen and examined at bedside.     OBJECTIVE:    VITAL SIGNS:  ICU Vital Signs Last 24 Hrs  T(C): 38.5 (01 Jul 2023 14:00), Max: 38.9 (30 Jun 2023 16:00)  T(F): 101.3 (01 Jul 2023 14:00), Max: 102 (30 Jun 2023 16:00)  HR: 116 (01 Jul 2023 14:00) (64 - 116)  BP: 139/64 (01 Jul 2023 14:00) (70/46 - 147/64)  BP(mean): 92 (01 Jul 2023 14:00) (52 - 96)  ABP: --  ABP(mean): --  RR: 22 (01 Jul 2023 14:00) (12 - 24)  SpO2: 94% (01 Jul 2023 14:00) (93% - 100%)    O2 Parameters below as of 01 Jul 2023 14:00  Patient On (Oxygen Delivery Method): ventilator, CPAP 5/5    O2 Concentration (%): 35      Mode: AC/ CMV (Assist Control/ Continuous Mandatory Ventilation), RR (machine): 12, TV (machine): 400, FiO2: 35, PEEP: 5, ITime: 1, MAP: 10, PIP: 23    06-30 @ 07:01 - 07-01 @ 07:00  --------------------------------------------------------  IN: 3663.1 mL / OUT: 2370 mL / NET: 1293.1 mL    07-01 @ 07:01 - 07-01 @ 15:17  --------------------------------------------------------  IN: 1172.5 mL / OUT: 790 mL / NET: 382.5 mL      CAPILLARY BLOOD GLUCOSE          PHYSICAL EXAM:    Constitutional: NAD, well-groomed, well-developed  HEENT: PERRLA, EOMI, Normal Hearing, MMM  Neck: No LAD, No JVD  Back: Normal spine flexure, No CVA tenderness  Respiratory: CTAB   Cardiovascular: S1 and S2, RRR, no M/G/R  Gastrointestinal: BS+, soft, NT/ND  Extremities: No peripheral edema  Vascular: 2+ peripheral pulses  Neurological: A/O x 3, no focal deficits  Psychiatric: Normal mood, normal affect  Musculoskeletal: 5/5 strength b/l upper and lower extremities  Skin: No rashes    MEDICATIONS:  MEDICATIONS  (STANDING):  albuterol/ipratropium for Nebulization 3 milliLiter(s) Nebulizer every 6 hours  aspirin  chewable 81 milliGRAM(s) Oral every 24 hours  atorvastatin 20 milliGRAM(s) Oral at bedtime  budesonide  80 MICROgram(s)/formoterol 4.5 MICROgram(s) Inhaler 2 Puff(s) Inhalation two times a day  busPIRone 7.5 milliGRAM(s) Oral <User Schedule>  chlorhexidine 0.12% Liquid 5 milliLiter(s) Oral Mucosa two times a day  chlorhexidine 2% Cloths 1 Application(s) Topical <User Schedule>  cisatracurium Infusion 3 MICROgram(s)/kG/Min (10.8 mL/Hr) IV Continuous <Continuous>  clopidogrel Tablet 75 milliGRAM(s) Oral daily  dexMEDEtomidine Infusion 0.1 MICROgram(s)/kG/Hr (1.5 mL/Hr) IV Continuous <Continuous>  fentaNYL   Infusion 1 MICROgram(s)/kG/Hr (5.99 mL/Hr) IV Continuous <Continuous>  lactated ringers. 1000 milliLiter(s) (75 mL/Hr) IV Continuous <Continuous>  norepinephrine Infusion 0.05 MICROgram(s)/kG/Min (5.62 mL/Hr) IV Continuous <Continuous>  pantoprazole    Tablet 40 milliGRAM(s) Oral before breakfast  piperacillin/tazobactam IVPB.. 4.5 Gram(s) IV Intermittent every 8 hours  polyethylene glycol 3350 17 Gram(s) Oral daily  propofol Infusion 9.989 MICROgram(s)/kG/Min (3.59 mL/Hr) IV Continuous <Continuous>  QUEtiapine 25 milliGRAM(s) Oral daily  tranexamic acid Injectable for Nebulization 500 milliGRAM(s) Inhalation every 8 hours    MEDICATIONS  (PRN):      ALLERGIES:  Allergies    Bactrim (Other)  Shrimp (Unknown)  sulfa drugs (Unknown)  Lobster (Unknown)  unknown (Ototoxicity)    Intolerances        LABS:                        12.1   15.46 )-----------( 146      ( 01 Jul 2023 05:09 )             37.1     07-01    135  |  100  |  6<L>  ----------------------------<  208<H>  3.4<L>   |  25  |  0.56    Ca    7.7<L>      01 Jul 2023 05:09    TPro  7.0  /  Alb  3.1<L>  /  TBili  1.4<H>  /  DBili  x   /  AST  21  /  ALT  12  /  AlkPhos  80  07-01      Urinalysis Basic - ( 01 Jul 2023 05:09 )    Color: x / Appearance: x / SG: x / pH: x  Gluc: 208 mg/dL / Ketone: x  / Bili: x / Urobili: x   Blood: x / Protein: x / Nitrite: x   Leuk Esterase: x / RBC: x / WBC x   Sq Epi: x / Non Sq Epi: x / Bacteria: x        RADIOLOGY & ADDITIONAL TESTS: Reviewed. INTERVAL HPI/OVERNIGHT EVENTS:    SUBJECTIVE: Patient seen and examined at bedside.     OBJECTIVE:    VITAL SIGNS:  ICU Vital Signs Last 24 Hrs  T(C): 38.5 (01 Jul 2023 14:00), Max: 38.9 (30 Jun 2023 16:00)  T(F): 101.3 (01 Jul 2023 14:00), Max: 102 (30 Jun 2023 16:00)  HR: 116 (01 Jul 2023 14:00) (64 - 116)  BP: 139/64 (01 Jul 2023 14:00) (70/46 - 147/64)  BP(mean): 92 (01 Jul 2023 14:00) (52 - 96)  ABP: --  ABP(mean): --  RR: 22 (01 Jul 2023 14:00) (12 - 24)  SpO2: 94% (01 Jul 2023 14:00) (93% - 100%)    O2 Parameters below as of 01 Jul 2023 14:00  Patient On (Oxygen Delivery Method): ventilator, CPAP 5/5    O2 Concentration (%): 35      Mode: AC/ CMV (Assist Control/ Continuous Mandatory Ventilation), RR (machine): 12, TV (machine): 400, FiO2: 35, PEEP: 5, ITime: 1, MAP: 10, PIP: 23    06-30 @ 07:01 - 07-01 @ 07:00  --------------------------------------------------------  IN: 3663.1 mL / OUT: 2370 mL / NET: 1293.1 mL    07-01 @ 07:01 - 07-01 @ 15:17  --------------------------------------------------------  IN: 1172.5 mL / OUT: 790 mL / NET: 382.5 mL      CAPILLARY BLOOD GLUCOSE          PHYSICAL EXAM:    Constitutional: intubated and sedated  HEENT:  MMM  Neck: No LAD, No JVD  Respiratory: decreased BS right  Cardiovascular: S1 and S2, RRR, no M/G/R  Gastrointestinal: BS+, soft, NT/ND  Extremities: No peripheral edema  Vascular: 2+ peripheral pulses  Neurological: moves extremities  Musculoskeletal: no joint swelling  Skin: No rashes    MEDICATIONS:  MEDICATIONS  (STANDING):  albuterol/ipratropium for Nebulization 3 milliLiter(s) Nebulizer every 6 hours  aspirin  chewable 81 milliGRAM(s) Oral every 24 hours  atorvastatin 20 milliGRAM(s) Oral at bedtime  budesonide  80 MICROgram(s)/formoterol 4.5 MICROgram(s) Inhaler 2 Puff(s) Inhalation two times a day  busPIRone 7.5 milliGRAM(s) Oral <User Schedule>  chlorhexidine 0.12% Liquid 5 milliLiter(s) Oral Mucosa two times a day  chlorhexidine 2% Cloths 1 Application(s) Topical <User Schedule>  cisatracurium Infusion 3 MICROgram(s)/kG/Min (10.8 mL/Hr) IV Continuous <Continuous>  clopidogrel Tablet 75 milliGRAM(s) Oral daily  dexMEDEtomidine Infusion 0.1 MICROgram(s)/kG/Hr (1.5 mL/Hr) IV Continuous <Continuous>  fentaNYL   Infusion 1 MICROgram(s)/kG/Hr (5.99 mL/Hr) IV Continuous <Continuous>  lactated ringers. 1000 milliLiter(s) (75 mL/Hr) IV Continuous <Continuous>  norepinephrine Infusion 0.05 MICROgram(s)/kG/Min (5.62 mL/Hr) IV Continuous <Continuous>  pantoprazole    Tablet 40 milliGRAM(s) Oral before breakfast  piperacillin/tazobactam IVPB.. 4.5 Gram(s) IV Intermittent every 8 hours  polyethylene glycol 3350 17 Gram(s) Oral daily  propofol Infusion 9.989 MICROgram(s)/kG/Min (3.59 mL/Hr) IV Continuous <Continuous>  QUEtiapine 25 milliGRAM(s) Oral daily  tranexamic acid Injectable for Nebulization 500 milliGRAM(s) Inhalation every 8 hours    MEDICATIONS  (PRN):      ALLERGIES:  Allergies    Bactrim (Other)  Shrimp (Unknown)  sulfa drugs (Unknown)  Lobster (Unknown)  unknown (Ototoxicity)    Intolerances        LABS:                        12.1   15.46 )-----------( 146      ( 01 Jul 2023 05:09 )             37.1     07-01    135  |  100  |  6<L>  ----------------------------<  208<H>  3.4<L>   |  25  |  0.56    Ca    7.7<L>      01 Jul 2023 05:09    TPro  7.0  /  Alb  3.1<L>  /  TBili  1.4<H>  /  DBili  x   /  AST  21  /  ALT  12  /  AlkPhos  80  07-01      Urinalysis Basic - ( 01 Jul 2023 05:09 )    Color: x / Appearance: x / SG: x / pH: x  Gluc: 208 mg/dL / Ketone: x  / Bili: x / Urobili: x   Blood: x / Protein: x / Nitrite: x   Leuk Esterase: x / RBC: x / WBC x   Sq Epi: x / Non Sq Epi: x / Bacteria: x        RADIOLOGY & ADDITIONAL TESTS: Reviewed. INTERVAL HPI/OVERNIGHT EVENTS: Patient was febrile at 100.4F. There are no other significant overnight events.    SUBJECTIVE: Patient seen and examined at bedside. Patient had RASS score= -3 to -4. Unable to assess subjective complaints or concerns.    OBJECTIVE:    VITAL SIGNS:  ICU Vital Signs Last 24 Hrs  T(C): 38.5 (01 Jul 2023 14:00), Max: 38.9 (30 Jun 2023 16:00)  T(F): 101.3 (01 Jul 2023 14:00), Max: 102 (30 Jun 2023 16:00)  HR: 116 (01 Jul 2023 14:00) (64 - 116)  BP: 139/64 (01 Jul 2023 14:00) (70/46 - 147/64)  BP(mean): 92 (01 Jul 2023 14:00) (52 - 96)  ABP: --  ABP(mean): --  RR: 22 (01 Jul 2023 14:00) (12 - 24)  SpO2: 94% (01 Jul 2023 14:00) (93% - 100%)    O2 Parameters below as of 01 Jul 2023 14:00  Patient On (Oxygen Delivery Method): ventilator, CPAP 5/5    O2 Concentration (%): 35      Mode: AC/ CMV (Assist Control/ Continuous Mandatory Ventilation), RR (machine): 12, TV (machine): 400, FiO2: 35, PEEP: 5, ITime: 1, MAP: 10, PIP: 23    06-30 @ 07:01 - 07-01 @ 07:00  --------------------------------------------------------  IN: 3663.1 mL / OUT: 2370 mL / NET: 1293.1 mL    07-01 @ 07:01 - 07-01 @ 15:17  --------------------------------------------------------  IN: 1172.5 mL / OUT: 790 mL / NET: 382.5 mL      CAPILLARY BLOOD GLUCOSE          PHYSICAL EXAM:    GENERAL: Sedated, intubated  HEAD:  Atraumatic, Normocephalic  EYES: pinpoint pupils  NECK: Supple, No JVD, no enlarged nodes  NERVOUS SYSTEM:  intubated, sedated and paralyzed. RASS score= -4 to -3  CHEST/LUNG: decreased breath sounds in RLL; No rales, rhonchi, or wheezing  HEART: S1 and S2, RRR, no M/G/R  ABDOMEN: Soft, Nontender, Nondistended; Bowel sounds present  EXTREMITIES:  2+ Peripheral Pulses, No clubbing, cyanosis, or edema. Warm symmetrically.  LYMPH: No lymphadenopathy noted  SKIN: No rashes or lesion    MEDICATIONS:  MEDICATIONS  (STANDING):  albuterol/ipratropium for Nebulization 3 milliLiter(s) Nebulizer every 6 hours  aspirin  chewable 81 milliGRAM(s) Oral every 24 hours  atorvastatin 20 milliGRAM(s) Oral at bedtime  budesonide  80 MICROgram(s)/formoterol 4.5 MICROgram(s) Inhaler 2 Puff(s) Inhalation two times a day  busPIRone 7.5 milliGRAM(s) Oral <User Schedule>  chlorhexidine 0.12% Liquid 5 milliLiter(s) Oral Mucosa two times a day  chlorhexidine 2% Cloths 1 Application(s) Topical <User Schedule>  cisatracurium Infusion 3 MICROgram(s)/kG/Min (10.8 mL/Hr) IV Continuous <Continuous>  clopidogrel Tablet 75 milliGRAM(s) Oral daily  dexMEDEtomidine Infusion 0.1 MICROgram(s)/kG/Hr (1.5 mL/Hr) IV Continuous <Continuous>  fentaNYL   Infusion 1 MICROgram(s)/kG/Hr (5.99 mL/Hr) IV Continuous <Continuous>  lactated ringers. 1000 milliLiter(s) (75 mL/Hr) IV Continuous <Continuous>  norepinephrine Infusion 0.05 MICROgram(s)/kG/Min (5.62 mL/Hr) IV Continuous <Continuous>  pantoprazole    Tablet 40 milliGRAM(s) Oral before breakfast  piperacillin/tazobactam IVPB.. 4.5 Gram(s) IV Intermittent every 8 hours  polyethylene glycol 3350 17 Gram(s) Oral daily  propofol Infusion 9.989 MICROgram(s)/kG/Min (3.59 mL/Hr) IV Continuous <Continuous>  QUEtiapine 25 milliGRAM(s) Oral daily  tranexamic acid Injectable for Nebulization 500 milliGRAM(s) Inhalation every 8 hours    MEDICATIONS  (PRN):      ALLERGIES:  Allergies    Bactrim (Other)  Shrimp (Unknown)  sulfa drugs (Unknown)  Lobster (Unknown)  unknown (Ototoxicity)    Intolerances        LABS:                        12.1   15.46 )-----------( 146      ( 01 Jul 2023 05:09 )             37.1     07-01    135  |  100  |  6<L>  ----------------------------<  208<H>  3.4<L>   |  25  |  0.56    Ca    7.7<L>      01 Jul 2023 05:09    TPro  7.0  /  Alb  3.1<L>  /  TBili  1.4<H>  /  DBili  x   /  AST  21  /  ALT  12  /  AlkPhos  80  07-01      Urinalysis Basic - ( 01 Jul 2023 05:09 )    Color: x / Appearance: x / SG: x / pH: x  Gluc: 208 mg/dL / Ketone: x  / Bili: x / Urobili: x   Blood: x / Protein: x / Nitrite: x   Leuk Esterase: x / RBC: x / WBC x   Sq Epi: x / Non Sq Epi: x / Bacteria: x        RADIOLOGY & ADDITIONAL TESTS: Reviewed.

## 2023-07-02 LAB
-  AZTREONAM: SIGNIFICANT CHANGE UP
-  CEFEPIME: SIGNIFICANT CHANGE UP
-  CIPROFLOXACIN: SIGNIFICANT CHANGE UP
-  LEVOFLOXACIN: SIGNIFICANT CHANGE UP
-  PIPERACILLIN/TAZOBACTAM: SIGNIFICANT CHANGE UP
-  TOBRAMYCIN: SIGNIFICANT CHANGE UP
ALBUMIN SERPL ELPH-MCNC: 2.5 G/DL — LOW (ref 3.3–5)
ALP SERPL-CCNC: 82 U/L — SIGNIFICANT CHANGE UP (ref 40–120)
ALT FLD-CCNC: 13 U/L — SIGNIFICANT CHANGE UP (ref 10–45)
ANION GAP SERPL CALC-SCNC: 8 MMOL/L — SIGNIFICANT CHANGE UP (ref 5–17)
APTT BLD: 33.4 SEC — SIGNIFICANT CHANGE UP (ref 27.5–35.5)
AST SERPL-CCNC: 22 U/L — SIGNIFICANT CHANGE UP (ref 10–40)
BASOPHILS # BLD AUTO: 0.12 K/UL — SIGNIFICANT CHANGE UP (ref 0–0.2)
BASOPHILS NFR BLD AUTO: 0.9 % — SIGNIFICANT CHANGE UP (ref 0–2)
BILIRUB SERPL-MCNC: 0.8 MG/DL — SIGNIFICANT CHANGE UP (ref 0.2–1.2)
BUN SERPL-MCNC: 6 MG/DL — LOW (ref 7–23)
BURR CELLS BLD QL SMEAR: PRESENT — SIGNIFICANT CHANGE UP
CALCIUM SERPL-MCNC: 8.3 MG/DL — LOW (ref 8.4–10.5)
CHLORIDE SERPL-SCNC: 107 MMOL/L — SIGNIFICANT CHANGE UP (ref 96–108)
CO2 SERPL-SCNC: 25 MMOL/L — SIGNIFICANT CHANGE UP (ref 22–31)
CREAT SERPL-MCNC: 0.62 MG/DL — SIGNIFICANT CHANGE UP (ref 0.5–1.3)
CULTURE RESULTS: SIGNIFICANT CHANGE UP
EGFR: 93 ML/MIN/1.73M2 — SIGNIFICANT CHANGE UP
EOSINOPHIL # BLD AUTO: 0.22 K/UL — SIGNIFICANT CHANGE UP (ref 0–0.5)
EOSINOPHIL NFR BLD AUTO: 1.7 % — SIGNIFICANT CHANGE UP (ref 0–6)
GIANT PLATELETS BLD QL SMEAR: PRESENT — SIGNIFICANT CHANGE UP
GLUCOSE SERPL-MCNC: 161 MG/DL — HIGH (ref 70–99)
HCT VFR BLD CALC: 33.5 % — LOW (ref 34.5–45)
HGB BLD-MCNC: 10.8 G/DL — LOW (ref 11.5–15.5)
LYMPHOCYTES # BLD AUTO: 1.96 K/UL — SIGNIFICANT CHANGE UP (ref 1–3.3)
LYMPHOCYTES # BLD AUTO: 14.8 % — SIGNIFICANT CHANGE UP (ref 13–44)
MAGNESIUM SERPL-MCNC: 1.8 MG/DL — SIGNIFICANT CHANGE UP (ref 1.6–2.6)
MANUAL SMEAR VERIFICATION: SIGNIFICANT CHANGE UP
MCHC RBC-ENTMCNC: 30.7 PG — SIGNIFICANT CHANGE UP (ref 27–34)
MCHC RBC-ENTMCNC: 32.2 GM/DL — SIGNIFICANT CHANGE UP (ref 32–36)
MCV RBC AUTO: 95.2 FL — SIGNIFICANT CHANGE UP (ref 80–100)
METHOD TYPE: SIGNIFICANT CHANGE UP
MONOCYTES # BLD AUTO: 0.46 K/UL — SIGNIFICANT CHANGE UP (ref 0–0.9)
MONOCYTES NFR BLD AUTO: 3.5 % — SIGNIFICANT CHANGE UP (ref 2–14)
NEUTROPHILS # BLD AUTO: 10.46 K/UL — HIGH (ref 1.8–7.4)
NEUTROPHILS NFR BLD AUTO: 79.1 % — HIGH (ref 43–77)
NIGHT BLUE STAIN TISS: SIGNIFICANT CHANGE UP
NIGHT BLUE STAIN TISS: SIGNIFICANT CHANGE UP
ORGANISM # SPEC MICROSCOPIC CNT: SIGNIFICANT CHANGE UP
ORGANISM # SPEC MICROSCOPIC CNT: SIGNIFICANT CHANGE UP
PHOSPHATE SERPL-MCNC: 2.1 MG/DL — LOW (ref 2.5–4.5)
PLAT MORPH BLD: ABNORMAL
PLATELET # BLD AUTO: 161 K/UL — SIGNIFICANT CHANGE UP (ref 150–400)
POIKILOCYTOSIS BLD QL AUTO: SIGNIFICANT CHANGE UP
POTASSIUM SERPL-MCNC: 4.2 MMOL/L — SIGNIFICANT CHANGE UP (ref 3.5–5.3)
POTASSIUM SERPL-SCNC: 4.2 MMOL/L — SIGNIFICANT CHANGE UP (ref 3.5–5.3)
PROT SERPL-MCNC: 5.9 G/DL — LOW (ref 6–8.3)
RBC # BLD: 3.52 M/UL — LOW (ref 3.8–5.2)
RBC # FLD: 13.2 % — SIGNIFICANT CHANGE UP (ref 10.3–14.5)
RBC BLD AUTO: SIGNIFICANT CHANGE UP
SODIUM SERPL-SCNC: 140 MMOL/L — SIGNIFICANT CHANGE UP (ref 135–145)
SPECIMEN SOURCE: SIGNIFICANT CHANGE UP
SPHEROCYTES BLD QL SMEAR: SLIGHT — SIGNIFICANT CHANGE UP
WBC # BLD: 13.22 K/UL — HIGH (ref 3.8–10.5)
WBC # FLD AUTO: 13.22 K/UL — HIGH (ref 3.8–10.5)

## 2023-07-02 PROCEDURE — 99233 SBSQ HOSP IP/OBS HIGH 50: CPT | Mod: GC

## 2023-07-02 PROCEDURE — 99233 SBSQ HOSP IP/OBS HIGH 50: CPT

## 2023-07-02 RX ORDER — ACETAMINOPHEN 500 MG
1000 TABLET ORAL ONCE
Refills: 0 | Status: COMPLETED | OUTPATIENT
Start: 2023-07-02 | End: 2023-07-02

## 2023-07-02 RX ORDER — MAGNESIUM SULFATE 500 MG/ML
1 VIAL (ML) INJECTION ONCE
Refills: 0 | Status: COMPLETED | OUTPATIENT
Start: 2023-07-02 | End: 2023-07-02

## 2023-07-02 RX ADMIN — POLYETHYLENE GLYCOL 3350 17 GRAM(S): 17 POWDER, FOR SOLUTION ORAL at 11:49

## 2023-07-02 RX ADMIN — Medication 3 MILLILITER(S): at 21:43

## 2023-07-02 RX ADMIN — QUETIAPINE FUMARATE 25 MILLIGRAM(S): 200 TABLET, FILM COATED ORAL at 17:44

## 2023-07-02 RX ADMIN — Medication 100 GRAM(S): at 07:19

## 2023-07-02 RX ADMIN — PIPERACILLIN AND TAZOBACTAM 25 GRAM(S): 4; .5 INJECTION, POWDER, LYOPHILIZED, FOR SOLUTION INTRAVENOUS at 21:30

## 2023-07-02 RX ADMIN — CHLORHEXIDINE GLUCONATE 5 MILLILITER(S): 213 SOLUTION TOPICAL at 17:44

## 2023-07-02 RX ADMIN — Medication 62.5 MILLIMOLE(S): at 09:31

## 2023-07-02 RX ADMIN — CLOPIDOGREL BISULFATE 75 MILLIGRAM(S): 75 TABLET, FILM COATED ORAL at 11:49

## 2023-07-02 RX ADMIN — Medication 81 MILLIGRAM(S): at 05:16

## 2023-07-02 RX ADMIN — TRANEXAMIC ACID 500 MILLIGRAM(S): 100 INJECTION, SOLUTION INTRAVENOUS at 05:44

## 2023-07-02 RX ADMIN — PIPERACILLIN AND TAZOBACTAM 25 GRAM(S): 4; .5 INJECTION, POWDER, LYOPHILIZED, FOR SOLUTION INTRAVENOUS at 14:25

## 2023-07-02 RX ADMIN — PROPOFOL 3.59 MICROGRAM(S)/KG/MIN: 10 INJECTION, EMULSION INTRAVENOUS at 03:15

## 2023-07-02 RX ADMIN — PIPERACILLIN AND TAZOBACTAM 25 GRAM(S): 4; .5 INJECTION, POWDER, LYOPHILIZED, FOR SOLUTION INTRAVENOUS at 06:37

## 2023-07-02 RX ADMIN — Medication 7.5 MILLIGRAM(S): at 05:16

## 2023-07-02 RX ADMIN — CHLORHEXIDINE GLUCONATE 5 MILLILITER(S): 213 SOLUTION TOPICAL at 05:16

## 2023-07-02 RX ADMIN — Medication 1000 MILLIGRAM(S): at 06:38

## 2023-07-02 RX ADMIN — Medication 400 MILLIGRAM(S): at 05:29

## 2023-07-02 RX ADMIN — PANTOPRAZOLE SODIUM 40 MILLIGRAM(S): 20 TABLET, DELAYED RELEASE ORAL at 05:16

## 2023-07-02 RX ADMIN — Medication 5.62 MICROGRAM(S)/KG/MIN: at 03:15

## 2023-07-02 RX ADMIN — Medication 3 MILLILITER(S): at 15:27

## 2023-07-02 RX ADMIN — ATORVASTATIN CALCIUM 20 MILLIGRAM(S): 80 TABLET, FILM COATED ORAL at 21:30

## 2023-07-02 RX ADMIN — Medication 3 MILLILITER(S): at 04:34

## 2023-07-02 RX ADMIN — Medication 3 MILLILITER(S): at 09:16

## 2023-07-02 RX ADMIN — CHLORHEXIDINE GLUCONATE 1 APPLICATION(S): 213 SOLUTION TOPICAL at 06:38

## 2023-07-02 RX ADMIN — SODIUM CHLORIDE 75 MILLILITER(S): 9 INJECTION, SOLUTION INTRAVENOUS at 03:15

## 2023-07-02 RX ADMIN — Medication 400 MILLIGRAM(S): at 21:30

## 2023-07-02 NOTE — PROGRESS NOTE ADULT - SUBJECTIVE AND OBJECTIVE BOX
PULMONARY CONSULT SERVICE FOLLOW-UP NOTE    INTERVAL HPI:  Reviewed chart and overnight events; patient seen and examined at bedside. Extubated.    MEDICATIONS:  Pulmonary:  albuterol/ipratropium for Nebulization 3 milliLiter(s) Nebulizer every 6 hours  budesonide  80 MICROgram(s)/formoterol 4.5 MICROgram(s) Inhaler 2 Puff(s) Inhalation two times a day    Antimicrobials:  piperacillin/tazobactam IVPB.. 4.5 Gram(s) IV Intermittent every 8 hours    Anticoagulants:  aspirin  chewable 81 milliGRAM(s) Oral every 24 hours  clopidogrel Tablet 75 milliGRAM(s) Oral daily    Cardiac:      Allergies    Bactrim (Other)  Shrimp (Unknown)  sulfa drugs (Unknown)  Lobster (Unknown)  unknown (Ototoxicity)    Intolerances        Vital Signs Last 24 Hrs  T(C): 37.8 (02 Jul 2023 18:34), Max: 38.3 (02 Jul 2023 05:00)  T(F): 100 (02 Jul 2023 18:34), Max: 100.9 (02 Jul 2023 05:00)  HR: 88 (02 Jul 2023 17:00) (65 - 99)  BP: 102/55 (02 Jul 2023 17:00) (84/47 - 131/60)  BP(mean): 77 (02 Jul 2023 17:00) (61 - 86)  RR: 25 (02 Jul 2023 17:00) (12 - 38)  SpO2: 97% (02 Jul 2023 17:00) (91% - 98%)    Parameters below as of 02 Jul 2023 17:00  Patient On (Oxygen Delivery Method): mask, Venturi  O2 Flow (L/min): 10  O2 Concentration (%): 35    07-01 @ 07:01  -  07-02 @ 07:00  --------------------------------------------------------  IN: 4028.8 mL / OUT: 1815 mL / NET: 2213.8 mL    07-02 @ 07:01 - 07-02 @ 19:15  --------------------------------------------------------  IN: 650.8 mL / OUT: 930 mL / NET: -279.2 mL      Mode: standby      PHYSICAL EXAM:  Constitutional: WDWN  HEENT: NC/AT; PERRL, anicteric sclera; MMM  Neck: supple  Cardiovascular: +S1/S2, RRR  Respiratory: Rhonchi  Gastrointestinal: soft, NT/ND  Extremities: WWP; no edema, clubbing or cyanosis  Vascular: 2+ radial pulses B/L  Neurological: awake and alert; PACHECO    LABS:  ABG - ( 30 Jun 2023 21:33 )  pH, Arterial: 7.38  pH, Blood: x     /  pCO2: 39    /  pO2: 132   / HCO3: 23    / Base Excess: -1.8  /  SaO2: 100.0               CBC Full  -  ( 02 Jul 2023 05:12 )  WBC Count : 13.22 K/uL  RBC Count : 3.52 M/uL  Hemoglobin : 10.8 g/dL  Hematocrit : 33.5 %  Platelet Count - Automated : 161 K/uL  Mean Cell Volume : 95.2 fl  Mean Cell Hemoglobin : 30.7 pg  Mean Cell Hemoglobin Concentration : 32.2 gm/dL  Auto Neutrophil # : 10.46 K/uL  Auto Lymphocyte # : 1.96 K/uL  Auto Monocyte # : 0.46 K/uL  Auto Eosinophil # : 0.22 K/uL  Auto Basophil # : 0.12 K/uL  Auto Neutrophil % : 79.1 %  Auto Lymphocyte % : 14.8 %  Auto Monocyte % : 3.5 %  Auto Eosinophil % : 1.7 %  Auto Basophil % : 0.9 %    07-02    140  |  107  |  6<L>  ----------------------------<  161<H>  4.2   |  25  |  0.62    Ca    8.3<L>      02 Jul 2023 05:12  Phos  2.1     07-02  Mg     1.8     07-02    TPro  5.9<L>  /  Alb  2.5<L>  /  TBili  0.8  /  DBili  x   /  AST  22  /  ALT  13  /  AlkPhos  82  07-02    PTT - ( 02 Jul 2023 05:12 )  PTT:33.4 sec      Urinalysis Basic - ( 02 Jul 2023 05:12 )    Color: x / Appearance: x / SG: x / pH: x  Gluc: 161 mg/dL / Ketone: x  / Bili: x / Urobili: x   Blood: x / Protein: x / Nitrite: x   Leuk Esterase: x / RBC: x / WBC x   Sq Epi: x / Non Sq Epi: x / Bacteria: x                RADIOLOGY & ADDITIONAL STUDIES:

## 2023-07-02 NOTE — PROGRESS NOTE ADULT - ASSESSMENT
Assessment and Plan:   · Assessment	  · Assessment  73yo Cantonese-speaking F PMH HTN, HLD, CAD s/p bioAVR/CABG (11/2018 SVG-RCA, Catalan Inspiris 21mm), endocarditis (lifelong penicillin), AF on warfarin, AAA, PVD, bronchiectasis, presented 6/28 w/ worsening cough w/ hemoptysis, now s/p ENT scope showing no bleed source. Also s/p Watchman 6/28 and worsening hemoptysis, transferred to MICU 6/29. Intubated 6/29 and s/p bronch showing blood-filled R lung.    Neuro:  #orientation  AOx3 prior to sedation  Now intubated and sedated after bronchoscopy showed blood-filled R lung, rest of plan for lungs as below.  - on propofol, fentanyl, DC nimbex     Cardiovascular:   #CAD s/p CABG  Known, takes ASA 81mg qd and Atorvastatin 20mg qhs at home.  - per CTS on ASA 81mg qd and Plavix 75mg qd while inpatient  - NGT today and continue ASA + Plavix  - resume Atorvastatin once extubated    #AF  #S/p AVR  #S/p Watchman  Known h/o AF, takes Warfarin at home. Not on any rate or rhythm-control agents at home. S/p watchman placement 6/28 w/ CTS.  - currently in NSR  - per CTS on ASA 81mg qd and Plavix 75mg qd while inpatient  - place NGT and continue ASA + Plavix    Respiratory:  #hemoptysis  Presented w/ complaints of hemoptysis, s/p scope w/ ENT showing no active bleeding site. Per pt has worsened over past 1-2 days in the hospital. Now s/p intubation and bronchoscopy 6/29 showing blood in R lung concerning for bleeding bronchial artery in RLL.  - No active bleed on CTA chest, no IR intervention at this time   - tranexamic acid nebs q8h standing  - no suctioning given risk of worsening bleeding  - bronchoscopy repeated this morning: no active bleed visualized, RUL, RML, RLL revealed copious amounts of mucopurulent secretions which were suctioned and sent for bronchial wash. Slight oozing on left side.  - failed CPAP trial    #COPD  #Bronchiectasis  Known, uses Albuterol 90mcg/inh 2 puffs q4h PRN and Breo Ellipta 100/25 mcg/INH at home  - holding breathing treatments for now given pt is intubated, resume once extubated  - Bleeding resolved on repeat bronchoscopy this AM. Copious mucopurulent secretions on right side were aspirated and sent to lab for culture and further analysis. Per pulm, patients symptoms warrants treatment for possible bronchiectasis exacerbation as etiology to her heomoptysis.    Plan per pulm recs:   - continue sedation and paralysis for next 24 hours   - repeat bronchoscopy tomorrow 7/1; if no evidence of bleeding then, cease paralytics and wean sedation   - c/w TXA nebs q8 hrs for total of 48 hours   - start antibiotics with antipseudomonal activity for bronchiectasis exacerbation  - avoid suctioning through ETT   - call pulmonary STAT for any changes     GI:  No active issues    Renal:  No active issues, stable renal function    Endocrine:  No active issues    Hematologic:  #hemoptysis  - no bleed seen on CTA. Hgb stable.  - follow pulm recs    ID:  #endocarditis  Known chronic endocarditis, on life long Penicillin   - Cefazolin 2g q8h x5 doses (from OR after Watchman)  - started on Zosyn     #leukocytosis  New leukocytosis after Watchman procedure and w/ hemoptysis, therefore may be reactive 2/2 given no other signs of new infection.  - WBC today 16.60, continue to trend  - f/u cultures  -  started antipseudomonal abx    Psych:  #depression/anxiety  Takes Buspirone 7.5mg qd and Seroquel 25mg qd at home.  - continue meds once extubated or once NGT placed    PPX  F: none  E: replenish PRN  N: NPO for now while intubated, then dash/tlc diet  GI ppx: protonix 40mg IV qd while intubated  DVT ppx: holding pharmacologic ppx for now given hemoptysis; SCDs  Code status: FULL CODE  Dispo: [id number]               Assessment and Plan:   · Assessment	  · Assessment  73yo Cantonese-speaking F PMH HTN, HLD, CAD s/p bioAVR/CABG (11/2018 SVG-RCA, Catalan Inspiris 21mm), endocarditis (lifelong penicillin), AF on warfarin, AAA, PVD, bronchiectasis, presented 6/28 w/ worsening cough w/ hemoptysis, now s/p ENT scope showing no bleed source. Also s/p Watchman 6/28 and worsening hemoptysis, transferred to MICU 6/29. Intubated 6/29 and s/p bronch showing blood-filled R lung.    Neuro:  #orientation  AOx3 prior to sedation  Now intubated and sedated after bronchoscopy showed blood-filled R lung, rest of plan for lungs as below.  - on propofol, fentanyl, DC nimbex     Cardiovascular:   #CAD s/p CABG  Known, takes ASA 81mg qd and Atorvastatin 20mg qhs at home.  - per CTS on ASA 81mg qd and Plavix 75mg qd while inpatient  - NGT today and continue ASA + Plavix  - resume Atorvastatin once extubated    #AF  #S/p AVR  #S/p Watchman  Known h/o AF, takes Warfarin at home. Not on any rate or rhythm-control agents at home. S/p watchman placement 6/28 w/ CTS.  - currently in NSR  - per CTS on ASA 81mg qd and Plavix 75mg qd while inpatient  - place NGT and continue ASA + Plavix  - IV fluids held today    Respiratory:  #hemoptysis  Presented w/ complaints of hemoptysis, s/p scope w/ ENT showing no active bleeding site. Per pt has worsened over past 1-2 days in the hospital. Now s/p intubation and bronchoscopy 6/29 showing blood in R lung concerning for bleeding bronchial artery in RLL.  - No active bleed on CTA chest or repeat bronch, no IR intervention at this time   - txa dc'd  - no suctioning given risk of worsening bleeding  - extubated today  - c/w zosyn    #COPD  #Bronchiectasis  Known, uses Albuterol 90mcg/inh 2 puffs q4h PRN and Breo Ellipta 100/25 mcg/INH at home  - breathing treatments resumed once extubated  - Bleeding resolved on repeat bronchoscopy  - txa dc'd  - started abx with antipseudomonal activity for bronchiectasis exacerbation    GI:  No active issues    Renal:  No active issues, stable renal function  -smith dc'd today, f/u TOV and bladder scan    Endocrine:  No active issues    Hematologic:  #hemoptysis  - no bleed seen on CTA. Hgb stable.  - follow pulm recs    ID:  #endocarditis  Known chronic endocarditis, on life long Penicillin   - Cefazolin 2g q8h x5 doses (from OR after Watchman)  - started on Zosyn     #leukocytosis  New leukocytosis after Watchman procedure and w/ hemoptysis, therefore may be reactive 2/2 given no other signs of new infection.  - WBC today 13.22, continue to trend  -cultures showed no growth  - started antipseudomonal abx 6/30/23    Psych:  #depression/anxiety  Takes Buspirone 7.5mg qd and Seroquel 25mg qd at home.  - continue meds once extubated or once NGT placed  - also takes clonazepam at home, currently held; request by family to restart medication    PPX  F: none  E: replenish PRN  N: NPO for now  GI ppx: protonix 40mg  DVT ppx: holding pharmacologic ppx for now given hemoptysis; SCDs  Code status: FULL CODE  Dispo: MICU

## 2023-07-02 NOTE — PROGRESS NOTE ADULT - ATTENDING COMMENTS
HTN, h/o CABG/AVR s/p Watchman procedure for AF c/b severe hemoptysis with bronchiectasis, AHRF  physical as above  trial of extubation  empiric zosyn pending sputum culture  tolerating ASA/plavix  continue TXA nebs and bronchodilators  decision making of high complexity

## 2023-07-02 NOTE — PROGRESS NOTE ADULT - SUBJECTIVE AND OBJECTIVE BOX
GERMÁN GUPTA , [id number],  [facility] [id number] [id number]    Time of encounter :  9:50 am  extubated this am, on ventimask  she is fully awake, answering question, nod/shake head and verbalizing as well  NGT in place.    T(C): 37.8 (07-02-23 @ 10:29), Max: 38.9 (07-01-23 @ 15:00)  HR: 98 (07-02-23 @ 11:00) (65 - 122)  BP: 119/55 (07-02-23 @ 11:00) (79/49 - 139/64)  RR: 20 (07-02-23 @ 11:00) (12 - 26)  SpO2: 95% (07-02-23 @ 11:00) (92% - 98%)    albuterol/ipratropium for Nebulization 3 milliLiter(s) Nebulizer every 6 hours  aspirin  chewable 81 milliGRAM(s) Oral every 24 hours  atorvastatin 20 milliGRAM(s) Oral at bedtime  budesonide  80 MICROgram(s)/formoterol 4.5 MICROgram(s) Inhaler 2 Puff(s) Inhalation two times a day  busPIRone 7.5 milliGRAM(s) Oral <User Schedule>  chlorhexidine 0.12% Liquid 5 milliLiter(s) Oral Mucosa two times a day  chlorhexidine 2% Cloths 1 Application(s) Topical <User Schedule>  cisatracurium Infusion 3 MICROgram(s)/kG/Min IV Continuous <Continuous>  clopidogrel Tablet 75 milliGRAM(s) Oral daily  dexMEDEtomidine Infusion 0.1 MICROgram(s)/kG/Hr IV Continuous <Continuous>  fentaNYL   Infusion. 0.5 MICROgram(s)/kG/Hr IV Continuous <Continuous>  norepinephrine Infusion 0.05 MICROgram(s)/kG/Min IV Continuous <Continuous>  pantoprazole    Tablet 40 milliGRAM(s) Oral before breakfast  piperacillin/tazobactam IVPB.. 4.5 Gram(s) IV Intermittent every 8 hours  polyethylene glycol 3350 17 Gram(s) Oral daily  propofol Infusion 9.989 MICROgram(s)/kG/Min IV Continuous <Continuous>  QUEtiapine 25 milliGRAM(s) Oral daily      Physical Exam :    General exam :  on ventimask, awake, alert,   NGT in place  RRR with systolic murmur  good air entry bilaterally  abdomen bs present , soft,   moving all limbs.  no edema noted.    CBC Full  -  ( 02 Jul 2023 05:12 )  WBC Count : 13.22 K/uL  RBC Count : 3.52 M/uL  Hemoglobin : 10.8 g/dL  Hematocrit : 33.5 %  Platelet Count - Automated : 161 K/uL  Mean Cell Volume : 95.2 fl  Mean Cell Hemoglobin : 30.7 pg  Mean Cell Hemoglobin Concentration : 32.2 gm/dL  Auto Neutrophil # : 10.46 K/uL  Auto Lymphocyte # : 1.96 K/uL  Auto Monocyte # : 0.46 K/uL  Auto Eosinophil # : 0.22 K/uL  Auto Basophil # : 0.12 K/uL  Auto Neutrophil % : 79.1 %  Auto Lymphocyte % : 14.8 %  Auto Monocyte % : 3.5 %  Auto Eosinophil % : 1.7 %  Auto Basophil % : 0.9 %        07-02    140  |  107  |  6<L>  ----------------------------<  161<H>  4.2   |  25  |  0.62    Ca    8.3<L>      02 Jul 2023 05:12  Phos  2.1     07-02  Mg     1.8     07-02    TPro  5.9<L>  /  Alb  2.5<L>  /  TBili  0.8  /  DBili  x   /  AST  22  /  ALT  13  /  AlkPhos  82  07-02    Daily     Daily   CAPILLARY BLOOD GLUCOSE          Culture - Bronchial (collected 01 Jul 2023 11:34)  Source: .Bronchial None  Gram Stain (01 Jul 2023 16:33):    No epithelial cells seen    Moderate White blood cells    Rare to few Gram Negative Rods  Final Report (02 Jul 2023 12:10):    Few Pseudomonas aeruginosa    See previous culture for susceptibility    No routine respiratory ana Isolated    Culture - Bronchial (collected 01 Jul 2023 11:34)  Source: .Bronchial None  Gram Stain (01 Jul 2023 16:39):    No epithelial cells seen    Numerous White blood cells    Few Gram Negative Rods  Final Report (02 Jul 2023 12:08):    Numerous Pseudomonas aeruginosa    See previous culture for susceptibility    No routine respiratory ana Isolated    Culture - Blood (collected 30 Jun 2023 05:21)  Source: .Blood Blood  Preliminary Report (02 Jul 2023 07:01):    No growth at 2 days.      Urinalysis Basic - ( 02 Jul 2023 05:12 )    Color: x / Appearance: x / SG: x / pH: x  Gluc: 161 mg/dL / Ketone: x  / Bili: x / Urobili: x   Blood: x / Protein: x / Nitrite: x   Leuk Esterase: x / RBC: x / WBC x   Sq Epi: x / Non Sq Epi: x / Bacteria: x        PTT - ( 02 Jul 2023 05:12 )  PTT:33.4 sec

## 2023-07-02 NOTE — PROGRESS NOTE ADULT - ASSESSMENT
-71 y/o Female with PMHx of HTN, HLD, CAD s/p bioAVR/CABG with Dr. Mitul Ly (11/2018 SVG-RCA, Catalan Inspiris 21mm), hx of Endocarditis on lifelong penicillin, AFib, AAA, PVD, presented with bronchiectasis presented for watchmen procedure. Pulmonary consulted for hemoptysis.     #Hemoptysis   #?Bronchiectasis     Seen and examined at bedside. She is having persistent hemoptysis despite being off AC without an ENT source identified - previously with hsitory of epistaxis, but no evidence of this on bedside scope by ENT today. No history to suggest GI source as her hematocrit has been stable - typically bleeds with a pulmonary/bronchial origin do not have exsanguination and this is consistent with that history thus far. SHe has a questionable history of bronchiectasis - on her scan, it is not overly impressive; but this can commonly be a cause of bronchial bleeding. There is oropharyngeal trauma on exam (possible as a combination of trauma from intubation and from YARIEL), but no active bleeding in the oropharynx. Given persistent hemoptysis with clots, she warrants bronchoscopic evaluation.     CTA without evidence of active bleeding. On repeat inspectional bronchoscopy this AM, her bleeding has resolved. SHe had copious mucopurulent secretions which were therapeutically aspirated and sent to the lab for culture and further analysis. This finding, in addition to her new fever overnight as well as her bronchiectasis, warrants treatment for possible bronchiectasis exacerbation as etiology to her heomoptysis. Would require drugs with anti pseudomonal activity given bronchiectasis.    Easily friable tissue during bronchoscopic evaluation, with purulent secretions, no visible bleed. sputum with pseudamonas.  - extubated today  - agree with zosyn  - suspect will need airway clearence  - dc txa

## 2023-07-02 NOTE — PROGRESS NOTE ADULT - ASSESSMENT
71 y/o Female with PMHx of HTN, HLD, CAD s/p bioAVR/CABG with Dr. Mitul Ly (11/2018 SVG-RCA, Catalan Inspiris 21mm), hx of Endocarditis on lifelong penicillin, AFib, AAA, PVD, presented with bronchiectasis worsening cough with hemoptysis on warfarin which has been stopped, referred by Dr. Mitul Gonzalez to Dr. Nubia Minor for pLAAo evaluation. Patient underwent CT Heart LA protocol . Reports no hemopytsis while coumadin is off. Pt underwent watchman device placement 6/28. Immediately post op developed some bradycardia and hypotension which resolved after she emerged from anesthesia. Pt developed some hemoptysis post op, likely related to elevated PTT. POD 1 pt still with hemoptysis - which she states has gotten worse, coags now WNL. Seen by pulm who borught pt to MICU for bronch. Pt with blood in lungs, IR unable to embolize. CTA chest did not reveal source of bleed-intubated, sedated, started on antibiotics coverage for pseudomonas ( bronchiectasis )     - hemoptysis,  exacerbation of Bronchiectasis   -intubated for airway protection, sedation/paralysis to suppress cough, give Trnaexamic acid nebs-- extubated 7/2 am   BAL with Pseudomonas -  - repeat Bronch 7/1- no active bleeding seen, frail tissues/ inflammation as dw pulmonary   - fu BAL ( pseudomas/ fu AFB )   - antibiotics for pseudomonal coverage- on Zosyn, wbc 15 on 7/1-, wbc down trending to 13.2   - Hb stable     - hypotension -likely sepsis with shock required pressors-now improved.    - AFib sp watchman device- on ASA and plavix  - CAD spbioavr/cabg   - hx of endocarditis on lifelong penicillin  - HTN ( home med amlodipine 2.5 held  - Hyperlipidemia ( lipitor 20 daily    - hx of Anxiety  - buspirone 5 mg daily , seroquel 25 daily , clonazepam 0.5 mg .  ppi   - home inhaler is proaif, breo ellipta.     [facility] structural heart Dr. Minor.   family contact :  Joseph Vazquez [phone] --   care by jacquelin/pulmonary  appreciated, dw pul, son updated- called son from patient bedside, thus allowing patient to hear her  and son, family will be visiting patient today.    Dr. Marcos Fan covering 7/3-7/10/23

## 2023-07-02 NOTE — PROGRESS NOTE ADULT - SUBJECTIVE AND OBJECTIVE BOX
Patient is a 72y old  Female who presents with a chief complaint of I48.91     (30 Jun 2023 15:41)      INTERVAL HPI/OVERNIGHT EVENTS:   No overnight events   Afebrile, hemodynamically stable     ICU Vital Signs Last 24 Hrs  T(C): 37.8 (02 Jul 2023 10:29), Max: 38.9 (01 Jul 2023 15:00)  T(F): 100 (02 Jul 2023 10:29), Max: 102 (01 Jul 2023 15:00)  HR: 98 (02 Jul 2023 11:00) (65 - 122)  BP: 119/55 (02 Jul 2023 11:00) (79/49 - 147/60)  BP(mean): 81 (02 Jul 2023 11:00) (56 - 92)  ABP: --  ABP(mean): --  RR: 20 (02 Jul 2023 11:00) (12 - 26)  SpO2: 95% (02 Jul 2023 11:00) (92% - 98%)    O2 Parameters below as of 02 Jul 2023 09:00  Patient On (Oxygen Delivery Method): mask, aerosol  O2 Flow (L/min): 10  O2 Concentration (%): 35      I&O's Summary    01 Jul 2023 07:01  -  02 Jul 2023 07:00  --------------------------------------------------------  IN: 4028.8 mL / OUT: 1815 mL / NET: 2213.8 mL    02 Jul 2023 07:01  -  02 Jul 2023 12:52  --------------------------------------------------------  IN: 119.5 mL / OUT: 100 mL / NET: 19.5 mL      Mode: standby      LABS:                        10.8   13.22 )-----------( 161      ( 02 Jul 2023 05:12 )             33.5     07-02    140  |  107  |  6<L>  ----------------------------<  161<H>  4.2   |  25  |  0.62    Ca    8.3<L>      02 Jul 2023 05:12  Phos  2.1     07-02  Mg     1.8     07-02    TPro  5.9<L>  /  Alb  2.5<L>  /  TBili  0.8  /  DBili  x   /  AST  22  /  ALT  13  /  AlkPhos  82  07-02    PTT - ( 02 Jul 2023 05:12 )  PTT:33.4 sec  Urinalysis Basic - ( 02 Jul 2023 05:12 )    Color: x / Appearance: x / SG: x / pH: x  Gluc: 161 mg/dL / Ketone: x  / Bili: x / Urobili: x   Blood: x / Protein: x / Nitrite: x   Leuk Esterase: x / RBC: x / WBC x   Sq Epi: x / Non Sq Epi: x / Bacteria: x      CAPILLARY BLOOD GLUCOSE        ABG - ( 30 Jun 2023 21:33 )  pH, Arterial: 7.38  pH, Blood: x     /  pCO2: 39    /  pO2: 132   / HCO3: 23    / Base Excess: -1.8  /  SaO2: 100.0               RADIOLOGY & ADDITIONAL TESTS:    Consultant(s) Notes Reviewed:  [x ] YES  [ ] NO    MEDICATIONS  (STANDING):  albuterol/ipratropium for Nebulization 3 milliLiter(s) Nebulizer every 6 hours  aspirin  chewable 81 milliGRAM(s) Oral every 24 hours  atorvastatin 20 milliGRAM(s) Oral at bedtime  budesonide  80 MICROgram(s)/formoterol 4.5 MICROgram(s) Inhaler 2 Puff(s) Inhalation two times a day  busPIRone 7.5 milliGRAM(s) Oral <User Schedule>  chlorhexidine 0.12% Liquid 5 milliLiter(s) Oral Mucosa two times a day  chlorhexidine 2% Cloths 1 Application(s) Topical <User Schedule>  cisatracurium Infusion 3 MICROgram(s)/kG/Min (10.8 mL/Hr) IV Continuous <Continuous>  clopidogrel Tablet 75 milliGRAM(s) Oral daily  dexMEDEtomidine Infusion 0.1 MICROgram(s)/kG/Hr (1.5 mL/Hr) IV Continuous <Continuous>  fentaNYL   Infusion. 0.5 MICROgram(s)/kG/Hr (3 mL/Hr) IV Continuous <Continuous>  norepinephrine Infusion 0.05 MICROgram(s)/kG/Min (5.62 mL/Hr) IV Continuous <Continuous>  pantoprazole    Tablet 40 milliGRAM(s) Oral before breakfast  piperacillin/tazobactam IVPB.. 4.5 Gram(s) IV Intermittent every 8 hours  polyethylene glycol 3350 17 Gram(s) Oral daily  propofol Infusion 9.989 MICROgram(s)/kG/Min (3.59 mL/Hr) IV Continuous <Continuous>  QUEtiapine 25 milliGRAM(s) Oral daily    MEDICATIONS  (PRN):      PHYSICAL EXAM:  GENERAL:   HEAD:  Atraumatic, Normocephalic  EYES: EOMI, PERRLA, conjunctiva and sclera clear  NECK: Supple, No JVD, Normal thyroid, no enlarged nodes  NERVOUS SYSTEM:  Alert & Awake.   CHEST/LUNG: B/L good air entry; No rales, rhonchi, or wheezing  HEART: S1S2 normal, no S3, Regular rate and rhythm; No murmurs  ABDOMEN: Soft, Nontender, Nondistended; Bowel sounds present  EXTREMITIES:  2+ Peripheral Pulses, No clubbing, cyanosis, or edema  LYMPH: No lymphadenopathy noted  SKIN: No rashes or lesions    Care Discussed with Consultants/Other Providers [ x] YES  [ ] NO Overnight:  Tmax 100.9 at 5am. Given IV tylenol, blood cultures ordered. Electrolytes repleted.     Subjective: Patient is resting comfortably. Fully awake and answering questions.    ICU Vital Signs Last 24 Hrs  T(C): 37.8 (02 Jul 2023 10:29), Max: 38.9 (01 Jul 2023 15:00)  T(F): 100 (02 Jul 2023 10:29), Max: 102 (01 Jul 2023 15:00)  HR: 98 (02 Jul 2023 11:00) (65 - 122)  BP: 119/55 (02 Jul 2023 11:00) (79/49 - 147/60)  BP(mean): 81 (02 Jul 2023 11:00) (56 - 92)  ABP: --  ABP(mean): --  RR: 20 (02 Jul 2023 11:00) (12 - 26)  SpO2: 95% (02 Jul 2023 11:00) (92% - 98%)    O2 Parameters below as of 02 Jul 2023 09:00  Patient On (Oxygen Delivery Method): mask, aerosol  O2 Flow (L/min): 10  O2 Concentration (%): 35    I&O's Summary    01 Jul 2023 07:01  -  02 Jul 2023 07:00  --------------------------------------------------------  IN: 4028.8 mL / OUT: 1815 mL / NET: 2213.8 mL    02 Jul 2023 07:01  -  02 Jul 2023 12:52  --------------------------------------------------------  IN: 119.5 mL / OUT: 100 mL / NET: 19.5 mL    Mode: standby      LABS:                        10.8   13.22 )-----------( 161      ( 02 Jul 2023 05:12 )             33.5     07-02    140  |  107  |  6<L>  ----------------------------<  161<H>  4.2   |  25  |  0.62    Ca    8.3<L>      02 Jul 2023 05:12  Phos  2.1     07-02  Mg     1.8     07-02    TPro  5.9<L>  /  Alb  2.5<L>  /  TBili  0.8  /  DBili  x   /  AST  22  /  ALT  13  /  AlkPhos  82  07-02    PTT - ( 02 Jul 2023 05:12 )  PTT:33.4 sec  Urinalysis Basic - ( 02 Jul 2023 05:12 )    Color: x / Appearance: x / SG: x / pH: x  Gluc: 161 mg/dL / Ketone: x  / Bili: x / Urobili: x   Blood: x / Protein: x / Nitrite: x   Leuk Esterase: x / RBC: x / WBC x   Sq Epi: x / Non Sq Epi: x / Bacteria: x    CAPILLARY BLOOD GLUCOSE    ABG - ( 30 Jun 2023 21:33 )  pH, Arterial: 7.38  pH, Blood: x     /  pCO2: 39    /  pO2: 132   / HCO3: 23    / Base Excess: -1.8  /  SaO2: 100.0       RADIOLOGY & ADDITIONAL TESTS: reviewed.    Consultant(s) Notes Reviewed:  [x ] YES  [ ] NO    MEDICATIONS  (STANDING):  albuterol/ipratropium for Nebulization 3 milliLiter(s) Nebulizer every 6 hours  aspirin  chewable 81 milliGRAM(s) Oral every 24 hours  atorvastatin 20 milliGRAM(s) Oral at bedtime  budesonide  80 MICROgram(s)/formoterol 4.5 MICROgram(s) Inhaler 2 Puff(s) Inhalation two times a day  busPIRone 7.5 milliGRAM(s) Oral <User Schedule>  chlorhexidine 0.12% Liquid 5 milliLiter(s) Oral Mucosa two times a day  chlorhexidine 2% Cloths 1 Application(s) Topical <User Schedule>  cisatracurium Infusion 3 MICROgram(s)/kG/Min (10.8 mL/Hr) IV Continuous <Continuous>  clopidogrel Tablet 75 milliGRAM(s) Oral daily  dexMEDEtomidine Infusion 0.1 MICROgram(s)/kG/Hr (1.5 mL/Hr) IV Continuous <Continuous>  fentaNYL   Infusion. 0.5 MICROgram(s)/kG/Hr (3 mL/Hr) IV Continuous <Continuous>  norepinephrine Infusion 0.05 MICROgram(s)/kG/Min (5.62 mL/Hr) IV Continuous <Continuous>  pantoprazole    Tablet 40 milliGRAM(s) Oral before breakfast  piperacillin/tazobactam IVPB.. 4.5 Gram(s) IV Intermittent every 8 hours  polyethylene glycol 3350 17 Gram(s) Oral daily  propofol Infusion 9.989 MICROgram(s)/kG/Min (3.59 mL/Hr) IV Continuous <Continuous>  QUEtiapine 25 milliGRAM(s) Oral daily    MEDICATIONS  (PRN):    PHYSICAL EXAM:  GENERAL:   HEAD:  Atraumatic, Normocephalic, NGT in place  EYES: PERRLA, conjunctiva and sclera clear  NECK: Supple  NERVOUS SYSTEM:  Alert & Awake.   CHEST/LUNG: B/L good air entry; rhonchi auscultated  HEART: S1S2 normal, RRR  ABDOMEN: Soft, Nontender, Nondistended; Bowel sounds present  EXTREMITIES:  2+ pulses b/l  SKIN: No rashes or lesions    Care Discussed with Consultants/Other Providers [ x] YES  [ ] NO

## 2023-07-03 DIAGNOSIS — F41.9 ANXIETY DISORDER, UNSPECIFIED: ICD-10-CM

## 2023-07-03 DIAGNOSIS — Z29.9 ENCOUNTER FOR PROPHYLACTIC MEASURES, UNSPECIFIED: ICD-10-CM

## 2023-07-03 DIAGNOSIS — I38 ENDOCARDITIS, VALVE UNSPECIFIED: ICD-10-CM

## 2023-07-03 DIAGNOSIS — J47.9 BRONCHIECTASIS, UNCOMPLICATED: ICD-10-CM

## 2023-07-03 DIAGNOSIS — R04.2 HEMOPTYSIS: ICD-10-CM

## 2023-07-03 DIAGNOSIS — I48.20 CHRONIC ATRIAL FIBRILLATION, UNSPECIFIED: ICD-10-CM

## 2023-07-03 DIAGNOSIS — I25.10 ATHEROSCLEROTIC HEART DISEASE OF NATIVE CORONARY ARTERY WITHOUT ANGINA PECTORIS: ICD-10-CM

## 2023-07-03 LAB
A1AT SERPL-MCNC: 323 MG/DL — HIGH (ref 90–200)
ALBUMIN SERPL ELPH-MCNC: 2.6 G/DL — LOW (ref 3.3–5)
ALP SERPL-CCNC: 88 U/L — SIGNIFICANT CHANGE UP (ref 40–120)
ALT FLD-CCNC: 11 U/L — SIGNIFICANT CHANGE UP (ref 10–45)
ANION GAP SERPL CALC-SCNC: 11 MMOL/L — SIGNIFICANT CHANGE UP (ref 5–17)
ANISOCYTOSIS BLD QL: SLIGHT — SIGNIFICANT CHANGE UP
AST SERPL-CCNC: 20 U/L — SIGNIFICANT CHANGE UP (ref 10–40)
BASOPHILS # BLD AUTO: 0 K/UL — SIGNIFICANT CHANGE UP (ref 0–0.2)
BASOPHILS NFR BLD AUTO: 0 % — SIGNIFICANT CHANGE UP (ref 0–2)
BILIRUB SERPL-MCNC: 0.8 MG/DL — SIGNIFICANT CHANGE UP (ref 0.2–1.2)
BUN SERPL-MCNC: 7 MG/DL — SIGNIFICANT CHANGE UP (ref 7–23)
CALCIUM SERPL-MCNC: 8.2 MG/DL — LOW (ref 8.4–10.5)
CHLORIDE SERPL-SCNC: 104 MMOL/L — SIGNIFICANT CHANGE UP (ref 96–108)
CO2 SERPL-SCNC: 26 MMOL/L — SIGNIFICANT CHANGE UP (ref 22–31)
CREAT SERPL-MCNC: 0.51 MG/DL — SIGNIFICANT CHANGE UP (ref 0.5–1.3)
EGFR: 98 ML/MIN/1.73M2 — SIGNIFICANT CHANGE UP
EOSINOPHIL # BLD AUTO: 0.09 K/UL — SIGNIFICANT CHANGE UP (ref 0–0.5)
EOSINOPHIL NFR BLD AUTO: 0.9 % — SIGNIFICANT CHANGE UP (ref 0–6)
GLUCOSE SERPL-MCNC: 131 MG/DL — HIGH (ref 70–99)
HCT VFR BLD CALC: 31.9 % — LOW (ref 34.5–45)
HGB BLD-MCNC: 10.5 G/DL — LOW (ref 11.5–15.5)
HYPOCHROMIA BLD QL: SLIGHT — SIGNIFICANT CHANGE UP
LYMPHOCYTES # BLD AUTO: 1.38 K/UL — SIGNIFICANT CHANGE UP (ref 1–3.3)
LYMPHOCYTES # BLD AUTO: 14.6 % — SIGNIFICANT CHANGE UP (ref 13–44)
MACROCYTES BLD QL: SLIGHT — SIGNIFICANT CHANGE UP
MAGNESIUM SERPL-MCNC: 2 MG/DL — SIGNIFICANT CHANGE UP (ref 1.6–2.6)
MANUAL SMEAR VERIFICATION: SIGNIFICANT CHANGE UP
MCHC RBC-ENTMCNC: 30.7 PG — SIGNIFICANT CHANGE UP (ref 27–34)
MCHC RBC-ENTMCNC: 32.9 GM/DL — SIGNIFICANT CHANGE UP (ref 32–36)
MCV RBC AUTO: 93.3 FL — SIGNIFICANT CHANGE UP (ref 80–100)
MICROCYTES BLD QL: SLIGHT — SIGNIFICANT CHANGE UP
MONOCYTES # BLD AUTO: 0.41 K/UL — SIGNIFICANT CHANGE UP (ref 0–0.9)
MONOCYTES NFR BLD AUTO: 4.3 % — SIGNIFICANT CHANGE UP (ref 2–14)
NEUTROPHILS # BLD AUTO: 7.58 K/UL — HIGH (ref 1.8–7.4)
NEUTROPHILS NFR BLD AUTO: 80.2 % — HIGH (ref 43–77)
OVALOCYTES BLD QL SMEAR: SLIGHT — SIGNIFICANT CHANGE UP
PHOSPHATE SERPL-MCNC: 1.9 MG/DL — LOW (ref 2.5–4.5)
PLAT MORPH BLD: ABNORMAL
PLATELET # BLD AUTO: 156 K/UL — SIGNIFICANT CHANGE UP (ref 150–400)
POIKILOCYTOSIS BLD QL AUTO: SLIGHT — SIGNIFICANT CHANGE UP
POLYCHROMASIA BLD QL SMEAR: SLIGHT — SIGNIFICANT CHANGE UP
POTASSIUM SERPL-MCNC: 3.2 MMOL/L — LOW (ref 3.5–5.3)
POTASSIUM SERPL-SCNC: 3.2 MMOL/L — LOW (ref 3.5–5.3)
PROT SERPL-MCNC: 6.2 G/DL — SIGNIFICANT CHANGE UP (ref 6–8.3)
RBC # BLD: 3.42 M/UL — LOW (ref 3.8–5.2)
RBC # FLD: 13.1 % — SIGNIFICANT CHANGE UP (ref 10.3–14.5)
RBC BLD AUTO: ABNORMAL
SODIUM SERPL-SCNC: 141 MMOL/L — SIGNIFICANT CHANGE UP (ref 135–145)
SPHEROCYTES BLD QL SMEAR: SLIGHT — SIGNIFICANT CHANGE UP
WBC # BLD: 9.45 K/UL — SIGNIFICANT CHANGE UP (ref 3.8–10.5)
WBC # FLD AUTO: 9.45 K/UL — SIGNIFICANT CHANGE UP (ref 3.8–10.5)

## 2023-07-03 PROCEDURE — 99233 SBSQ HOSP IP/OBS HIGH 50: CPT

## 2023-07-03 PROCEDURE — 99231 SBSQ HOSP IP/OBS SF/LOW 25: CPT

## 2023-07-03 PROCEDURE — 99233 SBSQ HOSP IP/OBS HIGH 50: CPT | Mod: GC

## 2023-07-03 RX ORDER — PANTOPRAZOLE SODIUM 20 MG/1
40 TABLET, DELAYED RELEASE ORAL DAILY
Refills: 0 | Status: DISCONTINUED | OUTPATIENT
Start: 2023-07-04 | End: 2023-07-12

## 2023-07-03 RX ORDER — ACETAMINOPHEN 500 MG
1000 TABLET ORAL ONCE
Refills: 0 | Status: COMPLETED | OUTPATIENT
Start: 2023-07-03 | End: 2023-07-03

## 2023-07-03 RX ORDER — POTASSIUM CHLORIDE 20 MEQ
40 PACKET (EA) ORAL ONCE
Refills: 0 | Status: COMPLETED | OUTPATIENT
Start: 2023-07-03 | End: 2023-07-03

## 2023-07-03 RX ORDER — POTASSIUM PHOSPHATE, MONOBASIC POTASSIUM PHOSPHATE, DIBASIC 236; 224 MG/ML; MG/ML
30 INJECTION, SOLUTION INTRAVENOUS ONCE
Refills: 0 | Status: COMPLETED | OUTPATIENT
Start: 2023-07-03 | End: 2023-07-03

## 2023-07-03 RX ORDER — CLONAZEPAM 1 MG
0.5 TABLET ORAL
Refills: 0 | Status: DISCONTINUED | OUTPATIENT
Start: 2023-07-03 | End: 2023-07-10

## 2023-07-03 RX ADMIN — POLYETHYLENE GLYCOL 3350 17 GRAM(S): 17 POWDER, FOR SOLUTION ORAL at 12:02

## 2023-07-03 RX ADMIN — Medication 3 MILLILITER(S): at 04:56

## 2023-07-03 RX ADMIN — CHLORHEXIDINE GLUCONATE 5 MILLILITER(S): 213 SOLUTION TOPICAL at 18:26

## 2023-07-03 RX ADMIN — ATORVASTATIN CALCIUM 20 MILLIGRAM(S): 80 TABLET, FILM COATED ORAL at 23:07

## 2023-07-03 RX ADMIN — BUDESONIDE AND FORMOTEROL FUMARATE DIHYDRATE 2 PUFF(S): 160; 4.5 AEROSOL RESPIRATORY (INHALATION) at 05:04

## 2023-07-03 RX ADMIN — Medication 400 MILLIGRAM(S): at 06:11

## 2023-07-03 RX ADMIN — Medication 3 MILLILITER(S): at 09:11

## 2023-07-03 RX ADMIN — Medication 3 MILLILITER(S): at 23:07

## 2023-07-03 RX ADMIN — Medication 1000 MILLIGRAM(S): at 06:20

## 2023-07-03 RX ADMIN — Medication 7.5 MILLIGRAM(S): at 05:01

## 2023-07-03 RX ADMIN — POTASSIUM PHOSPHATE, MONOBASIC POTASSIUM PHOSPHATE, DIBASIC 83.33 MILLIMOLE(S): 236; 224 INJECTION, SOLUTION INTRAVENOUS at 09:59

## 2023-07-03 RX ADMIN — QUETIAPINE FUMARATE 25 MILLIGRAM(S): 200 TABLET, FILM COATED ORAL at 18:26

## 2023-07-03 RX ADMIN — PIPERACILLIN AND TAZOBACTAM 25 GRAM(S): 4; .5 INJECTION, POWDER, LYOPHILIZED, FOR SOLUTION INTRAVENOUS at 14:55

## 2023-07-03 RX ADMIN — PIPERACILLIN AND TAZOBACTAM 25 GRAM(S): 4; .5 INJECTION, POWDER, LYOPHILIZED, FOR SOLUTION INTRAVENOUS at 23:07

## 2023-07-03 RX ADMIN — CHLORHEXIDINE GLUCONATE 1 APPLICATION(S): 213 SOLUTION TOPICAL at 05:01

## 2023-07-03 RX ADMIN — Medication 40 MILLIEQUIVALENT(S): at 06:56

## 2023-07-03 RX ADMIN — Medication 3 MILLILITER(S): at 17:51

## 2023-07-03 RX ADMIN — Medication 81 MILLIGRAM(S): at 05:01

## 2023-07-03 RX ADMIN — Medication 0.5 MILLIGRAM(S): at 18:26

## 2023-07-03 RX ADMIN — PANTOPRAZOLE SODIUM 40 MILLIGRAM(S): 20 TABLET, DELAYED RELEASE ORAL at 05:00

## 2023-07-03 RX ADMIN — CHLORHEXIDINE GLUCONATE 5 MILLILITER(S): 213 SOLUTION TOPICAL at 05:02

## 2023-07-03 RX ADMIN — CLOPIDOGREL BISULFATE 75 MILLIGRAM(S): 75 TABLET, FILM COATED ORAL at 12:02

## 2023-07-03 RX ADMIN — Medication 1000 MILLIGRAM(S): at 00:24

## 2023-07-03 RX ADMIN — PIPERACILLIN AND TAZOBACTAM 25 GRAM(S): 4; .5 INJECTION, POWDER, LYOPHILIZED, FOR SOLUTION INTRAVENOUS at 05:00

## 2023-07-03 NOTE — PROGRESS NOTE ADULT - PROBLEM SELECTOR PLAN 2
Known, uses Albuterol 90mcg/inh 2 puffs q4h PRN and Breo Ellipta 100/25 mcg/INH at home    - breathing treatments resumed once extubated  - Bleeding resolved on repeat bronchoscopy  - txa dc'd  - started abx with antipseudomonal activity for bronchiectasis exacerbation  - f/u bronchiectasis labs per pulm: alpha 1 antitrypsin, ANCAs, aspergillus ab, CCP, CF expanded panel, IgE total, immunoglobulins, protein electrophoresis, RF, and Sjogrens  - WBC today 9.45 WNL  - cultures showed no growth Known, uses Albuterol 90mcg/inh 2 puffs q4h PRN and Breo Ellipta 100/25 mcg/INH at home. Bleeding resolved on repeat bronchoscopy  - c/w zosyn for bronchiectasis exacerbation  - f/u bronchiectasis labs per pulm: alpha 1 antitrypsin, ANCAs, aspergillus ab, CCP, CF expanded panel, IgE total, immunoglobulins, protein electrophoresis, RF, and Sjogrens  - cultures showed no growth

## 2023-07-03 NOTE — PROGRESS NOTE ADULT - ATTENDING COMMENTS
Seen in MICU. no more hemoptysis, will be transferred to F, cont tx of Pseudomonas, bronchiectasis w/u as above, will follow w you.

## 2023-07-03 NOTE — PROGRESS NOTE ADULT - PROBLEM SELECTOR PLAN 1
Presented w/ complaints of hemoptysis, s/p scope w/ ENT showing no active bleeding site. Per pt has worsened over past 1-2 days in the hospital. Now s/p intubation and bronchoscopy 6/29 showing blood in R lung concerning for bleeding bronchial artery in RLL.    - No active bleed on CTA chest or repeat bronch; Hb stable, no IR intervention at this time   - txa dc'd  - no suctioning given risk of worsening bleeding  - extubated 7/2  - c/w zosyn (started 06/30/23) to treat pseudomonas per pulm  - follow pulm recs Presented w/ complaints of hemoptysis, s/p scope w/ ENT showing no active bleeding site. Per pt has worsened over past 1-2 days in the hospital. Now s/p intubation and bronchoscopy 6/29 showing blood in R lung concerning for bleeding bronchial artery in RLL.  No active bleed on CTA chest or repeat bronch; Hb stable, no IR intervention at this time. s/p txa. extubated.   - limit suctioning given risk of worsening bleeding  - c/w zosyn (started 06/30/23) to treat pseudomonas per pulm  - follow pulm recs

## 2023-07-03 NOTE — PROGRESS NOTE ADULT - PROBLEM SELECTOR PLAN 6
Takes Buspirone 7.5mg qd, Seroquel 25mg qd, and clonazepam 0.5mg BID at home.    - now s/p extubation, c/w psych meds inpatient Takes Buspirone 7.5mg qd, Seroquel 25mg qd, and clonazepam 0.5mg BID at home.    - c/w psych meds inpatient

## 2023-07-03 NOTE — PROGRESS NOTE ADULT - ASSESSMENT
Assessment and Plan:     73yo Cantonese-speaking F PMH HTN, HLD, CAD s/p bioAVR/CABG (11/2018 SVG-RCA, Catalan Inspiris 21mm), endocarditis (lifelong penicillin), AF on warfarin, AAA, PVD, bronchiectasis, presented 6/28 w/ worsening cough w/ hemoptysis, now s/p ENT scope showing no bleed source. Initial bronch showed blood filled R lung. s/p Watchman 6/28, transferred to MICU 6/29 due to worsening hemoptysis and for intubation. Patient is now extubated with improving prognosis.                              PPX  F: none  E: replenish PRN  N: NPO  GI ppx: protonix 40mg  DVT ppx: holding pharmacologic ppx for now given hemoptysis; SCDs  Code status: FULL CODE  Dispo: MICU               Assessment and Plan:     73yo Cantonese-speaking F PMH HTN, HLD, CAD s/p bioAVR/CABG (11/2018 SVG-RCA, Catalan Inspiris 21mm), endocarditis (lifelong penicillin), AF on warfarin, AAA, PVD, bronchiectasis, presented 6/28 w/ worsening cough w/ hemoptysis, now s/p ENT scope showing no bleed source. Initial bronch showed blood filled R lung. s/p Watchman 6/28, transferred to MICU 6/29 due to worsening hemoptysis and for intubation. Patient is now extubated with improving prognosis.

## 2023-07-03 NOTE — PROGRESS NOTE ADULT - ASSESSMENT
Assessment and Plan:   · Assessment	  · Assessment  73yo Cantonese-speaking F PMH HTN, HLD, CAD s/p bioAVR/CABG (11/2018 SVG-RCA, Catalan Inspiris 21mm), endocarditis (lifelong penicillin), AF on warfarin, AAA, PVD, bronchiectasis, presented 6/28 w/ worsening cough w/ hemoptysis, now s/p ENT scope showing no bleed source. Also s/p Watchman 6/28 and worsening hemoptysis, transferred to MICU 6/29. Intubated 6/29 and s/p bronch showing blood-filled R lung.    Neuro:  #orientation  AOx3 prior to sedation  Now intubated and sedated after bronchoscopy showed blood-filled R lung, rest of plan for lungs as below.  - on propofol, fentanyl, DC nimbex     Cardiovascular:   #CAD s/p CABG  Known, takes ASA 81mg qd and Atorvastatin 20mg qhs at home.  - per CTS on ASA 81mg qd and Plavix 75mg qd while inpatient  - NGT today and continue ASA + Plavix  - resume Atorvastatin once extubated    #AF  #S/p AVR  #S/p Watchman  Known h/o AF, takes Warfarin at home. Not on any rate or rhythm-control agents at home. S/p watchman placement 6/28 w/ CTS.  - currently in NSR  - per CTS on ASA 81mg qd and Plavix 75mg qd while inpatient  - place NGT and continue ASA + Plavix  - IV fluids held today    Respiratory:  #hemoptysis  Presented w/ complaints of hemoptysis, s/p scope w/ ENT showing no active bleeding site. Per pt has worsened over past 1-2 days in the hospital. Now s/p intubation and bronchoscopy 6/29 showing blood in R lung concerning for bleeding bronchial artery in RLL.  - No active bleed on CTA chest or repeat bronch, no IR intervention at this time   - txa dc'd  - no suctioning given risk of worsening bleeding  - extubated today  - c/w zosyn    #COPD  #Bronchiectasis  Known, uses Albuterol 90mcg/inh 2 puffs q4h PRN and Breo Ellipta 100/25 mcg/INH at home  - breathing treatments resumed once extubated  - Bleeding resolved on repeat bronchoscopy  - txa dc'd  - started abx with antipseudomonal activity for bronchiectasis exacerbation    GI:  No active issues    Renal:  No active issues, stable renal function  -smith dc'd today, f/u TOV and bladder scan    Endocrine:  No active issues    Hematologic:  #hemoptysis  - no bleed seen on CTA. Hgb stable.  - follow pulm recs    ID:  #endocarditis  Known chronic endocarditis, on life long Penicillin   - Cefazolin 2g q8h x5 doses (from OR after Watchman)  - started on Zosyn     #leukocytosis  New leukocytosis after Watchman procedure and w/ hemoptysis, therefore may be reactive 2/2 given no other signs of new infection.  - WBC today 13.22, continue to trend  -cultures showed no growth  - started antipseudomonal abx 6/30/23    Psych:  #depression/anxiety  Takes Buspirone 7.5mg qd and Seroquel 25mg qd at home.  - continue meds once extubated or once NGT placed  - also takes clonazepam at home, currently held; request by family to restart medication    PPX  F: none  E: replenish PRN  N: NPO for now  GI ppx: protonix 40mg  DVT ppx: holding pharmacologic ppx for now given hemoptysis; SCDs  Code status: FULL CODE  Dispo: MICU               Assessment and Plan:     71yo Cantonese-speaking F PMH HTN, HLD, CAD s/p bioAVR/CABG (11/2018 SVG-RCA, Catalan Inspiris 21mm), endocarditis (lifelong penicillin), AF on warfarin, AAA, PVD, bronchiectasis, presented 6/28 w/ worsening cough w/ hemoptysis, now s/p ENT scope showing no bleed source. Initial bronch showed blood filled R lung. s/p Watchman 6/28, transferred to MICU 6/29 due to worsening hemoptysis and for intubation. Patient is now extubated with improving prognosis.    Neuro:  #orientation  AOx3 prior to sedation  - intubated on 6/29 and sedated after bronchoscopy showed blood-filled R lung  - extubated on 7/2  - propofol, fentanyl, nimbex dc'd    Cardiovascular:   #CAD s/p CABG  Known, takes ASA 81mg qd and Atorvastatin 20mg qhs at home.  - per CTS on ASA 81mg qd and Plavix 75mg qd while inpatient  - NGT today and continue ASA + Plavix  - Atorvastatin resumed once extubated    #AF  #S/p AVR  #S/p Watchman  Known h/o AF, takes Warfarin at home. Not on any rate or rhythm-control agents at home. S/p watchman placement 6/28 w/ CTS.  - currently in NSR  - per CTS on ASA 81mg qd and Plavix 75mg qd while inpatient via NGT    Respiratory:  #hemoptysis  Presented w/ complaints of hemoptysis, s/p scope w/ ENT showing no active bleeding site. Per pt has worsened over past 1-2 days in the hospital. Now s/p intubation and bronchoscopy 6/29 showing blood in R lung concerning for bleeding bronchial artery in RLL.  - No active bleed on CTA chest or repeat bronch, no IR intervention at this time   - txa dc'd  - no suctioning given risk of worsening bleeding  - extubated 7/2  - c/w zosyn to treat pseudomonas per pulm      #COPD  #Bronchiectasis  Known, uses Albuterol 90mcg/inh 2 puffs q4h PRN and Breo Ellipta 100/25 mcg/INH at home  - breathing treatments resumed once extubated  - Bleeding resolved on repeat bronchoscopy  - txa dc'd  - started abx with antipseudomonal activity for bronchiectasis exacerbation  - f/u bronchiectasis labs per pulm: alpha 1 antitrypsin, ANCAs, aspergillus ab, CCP, CF expanded panel, IgE total, immunoglobulins, protein electrophoresis, RF, and Sjogrens    GI:  No active issues    Renal:  No active issues, stable renal function  -smith dc'd today, f/u TOV and bladder scan    Endocrine:  No active issues    Hematologic:  #hemoptysis  - no bleed seen on CTA. Hgb stable.  - follow pulm recs    ID:  #endocarditis  Known chronic endocarditis, on life long Penicillin   - s/p Cefazolin 2g q8h x5 doses (from OR after Watchman)  - started on Zosyn for antipseudomonal coverage, also sufficient for tx of chronic endocarditis    #leukocytosis  New leukocytosis after Watchman procedure and w/ hemoptysis, therefore may be reactive 2/2 given no other signs of new infection.  - WBC today 9.45 WNL  - cultures showed no growth  - started antipseudomonal abx 6/30/23    Psych:  #depression/anxiety  Takes Buspirone 7.5mg qd, Seroquel 25mg qd, and clonazepam 0.5mg BID at home.  - now s/p extubation, c/w psych meds inpatient    PPX  F: none  E: replenish PRN  N: NPO  GI ppx: protonix 40mg  DVT ppx: holding pharmacologic ppx for now given hemoptysis; SCDs  Code status: FULL CODE  Dispo: MICU

## 2023-07-03 NOTE — PROGRESS NOTE ADULT - ATTENDING COMMENTS
h/o AVR/CABG s/p Watchman procedure for AF c/b bronchiectasis induced hemopthysis with AHRF  physical as above  tolerating extubation  continue ASA and plavix  swallowing evaulation  empiric zosyn  nasal oxygen  decoisiion making of high compleixy

## 2023-07-03 NOTE — PROGRESS NOTE ADULT - SUBJECTIVE AND OBJECTIVE BOX
Patient is a 72y old  Female who presents with a chief complaint of I48.91     (30 Jun 2023 15:41)      INTERVAL HPI/OVERNIGHT EVENTS:   No overnight events   Afebrile, hemodynamically stable     ICU Vital Signs Last 24 Hrs  T(C): 37.6 (03 Jul 2023 02:00), Max: 38.2 (02 Jul 2023 21:00)  T(F): 99.7 (03 Jul 2023 02:00), Max: 100.8 (02 Jul 2023 21:00)  HR: 87 (03 Jul 2023 05:46) (76 - 100)  BP: 117/57 (03 Jul 2023 05:00) (88/54 - 131/60)  BP(mean): 82 (03 Jul 2023 05:00) (66 - 86)  ABP: --  ABP(mean): --  RR: 18 (03 Jul 2023 05:00) (14 - 38)  SpO2: 98% (03 Jul 2023 05:46) (91% - 98%)    O2 Parameters below as of 03 Jul 2023 05:46  Patient On (Oxygen Delivery Method): mask, aerosol          I&O's Summary    01 Jul 2023 07:01  -  02 Jul 2023 07:00  --------------------------------------------------------  IN: 4028.8 mL / OUT: 1815 mL / NET: 2213.8 mL    02 Jul 2023 07:01  -  03 Jul 2023 06:52  --------------------------------------------------------  IN: 650.8 mL / OUT: 1305 mL / NET: -654.2 mL      Mode: [facility]      LABS:                        10.5   9.45  )-----------( 156      ( 03 Jul 2023 05:56 )             31.9     07-03    141  |  104  |  7   ----------------------------<  131<H>  3.2<L>   |  26  |  0.51    Ca    8.2<L>      03 Jul 2023 05:56  Phos  1.9     07-03  Mg     2.0     07-03    TPro  6.2  /  Alb  2.6<L>  /  TBili  0.8  /  DBili  x   /  AST  20  /  ALT  11  /  AlkPhos  88  07-03    PTT - ( 02 Jul 2023 05:12 )  PTT:33.4 sec  Urinalysis Basic - ( 03 Jul 2023 05:56 )    Color: x / Appearance: x / SG: x / pH: x  Gluc: 131 mg/dL / Ketone: x  / Bili: x / Urobili: x   Blood: x / Protein: x / Nitrite: x   Leuk Esterase: x / RBC: x / WBC x   Sq Epi: x / Non Sq Epi: x / Bacteria: x      CAPILLARY BLOOD GLUCOSE            RADIOLOGY & ADDITIONAL TESTS:    Consultant(s) Notes Reviewed:  [x ] YES  [ ] NO    MEDICATIONS  (STANDING):  albuterol/ipratropium for Nebulization 3 milliLiter(s) Nebulizer every 6 hours  aspirin  chewable 81 milliGRAM(s) Oral every 24 hours  atorvastatin 20 milliGRAM(s) Oral at bedtime  budesonide  80 MICROgram(s)/formoterol 4.5 MICROgram(s) Inhaler 2 Puff(s) Inhalation two times a day  busPIRone 7.5 milliGRAM(s) Oral <User Schedule>  chlorhexidine 0.12% Liquid 5 milliLiter(s) Oral Mucosa two times a day  chlorhexidine 2% Cloths 1 Application(s) Topical <User Schedule>  clopidogrel Tablet 75 milliGRAM(s) Oral daily  pantoprazole    Tablet 40 milliGRAM(s) Oral before breakfast  piperacillin/tazobactam IVPB.. 4.5 Gram(s) IV Intermittent every 8 hours  polyethylene glycol 3350 17 Gram(s) Oral daily  potassium chloride   Powder 40 milliEquivalent(s) Oral once  potassium phosphate IVPB 30 milliMole(s) IV Intermittent once  QUEtiapine 25 milliGRAM(s) Oral daily    MEDICATIONS  (PRN):      PHYSICAL EXAM:  GENERAL:   HEAD:  Atraumatic, Normocephalic  EYES: EOMI, PERRLA, conjunctiva and sclera clear  NECK: Supple, No JVD, Normal thyroid, no enlarged nodes  NERVOUS SYSTEM:  Alert & Awake.   CHEST/LUNG: B/L good air entry; No rales, rhonchi, or wheezing  HEART: S1S2 normal, no S3, Regular rate and rhythm; No murmurs  ABDOMEN: Soft, Nontender, Nondistended; Bowel sounds present  EXTREMITIES:  2+ Peripheral Pulses, No clubbing, cyanosis, or edema  LYMPH: No lymphadenopathy noted  SKIN: No rashes or lesions    Care Discussed with Consultants/Other Providers [ x] YES  [ ] NO *************Transfer from MICU-->RMF***************    Hospital course    71yo Cantonese-speaking F PMH HTN, HLD, CAD s/p bioprosthetic AVR and CABG (11/2018 SVG-RCA, AVR Catalan 21mm), endocarditis (on lifelong penicillin), AF (Warfarin), AAA, PVD, bronchiectasis, presented 6/28 w/ worsening cough w/ hemoptysis, now s/p ENT scope showing no bleed source. Also s/p Watchman and worsening hemoptysis and transferred to MICU. Intubated and started levophed 6/29, s/p bronch x2 showing blood-filled R lung without confirmed source of bleeding. Started zosyn given purulent secretions from bronch. Extubated and weaned off pressors 7/2. Failed dysphagia screen and S&S rec'd NPO w/ NGT pending further eval. VSS and stable for stepdown to [facility] for further management.    Overnight: Tmax at 100.8, given IV tylenol. Bladder scan ovn showed 350s, px required straight catheterization.    ICU Vital Signs Last 24 Hrs  T(C): 37.6 (03 Jul 2023 02:00), Max: 38.2 (02 Jul 2023 21:00)  T(F): 99.7 (03 Jul 2023 02:00), Max: 100.8 (02 Jul 2023 21:00)  HR: 87 (03 Jul 2023 05:46) (76 - 100)  BP: 117/57 (03 Jul 2023 05:00) (88/54 - 131/60)  BP(mean): 82 (03 Jul 2023 05:00) (66 - 86)  ABP: --  ABP(mean): --  RR: 18 (03 Jul 2023 05:00) (14 - 38)  SpO2: 98% (03 Jul 2023 05:46) (91% - 98%)    O2 Parameters below as of 03 Jul 2023 05:46  Patient On (Oxygen Delivery Method): mask, aerosol    I&O's Summary    01 Jul 2023 07:01  -  02 Jul 2023 07:00  --------------------------------------------------------  IN: 4028.8 mL / OUT: 1815 mL / NET: 2213.8 mL    02 Jul 2023 07:01  -  03 Jul 2023 06:52  --------------------------------------------------------  IN: 650.8 mL / OUT: 1305 mL / NET: -654.2 mL      Mode: standby      LABS:                        10.5   9.45  )-----------( 156      ( 03 Jul 2023 05:56 )             31.9     07-03    141  |  104  |  7   ----------------------------<  131<H>  3.2<L>   |  26  |  0.51    Ca    8.2<L>      03 Jul 2023 05:56  Phos  1.9     07-03  Mg     2.0     07-03    TPro  6.2  /  Alb  2.6<L>  /  TBili  0.8  /  DBili  x   /  AST  20  /  ALT  11  /  AlkPhos  88  07-03    PTT - ( 02 Jul 2023 05:12 )  PTT:33.4 sec  Urinalysis Basic - ( 03 Jul 2023 05:56 )    Color: x / Appearance: x / SG: x / pH: x  Gluc: 131 mg/dL / Ketone: x  / Bili: x / Urobili: x   Blood: x / Protein: x / Nitrite: x   Leuk Esterase: x / RBC: x / WBC x   Sq Epi: x / Non Sq Epi: x / Bacteria: x    RADIOLOGY & ADDITIONAL TESTS: reviewed.    Consultant(s) Notes Reviewed:  [x ] YES  [ ] NO    MEDICATIONS  (STANDING):  albuterol/ipratropium for Nebulization 3 milliLiter(s) Nebulizer every 6 hours  aspirin  chewable 81 milliGRAM(s) Oral every 24 hours  atorvastatin 20 milliGRAM(s) Oral at bedtime  budesonide  80 MICROgram(s)/formoterol 4.5 MICROgram(s) Inhaler 2 Puff(s) Inhalation two times a day  busPIRone 7.5 milliGRAM(s) Oral <User Schedule>  chlorhexidine 0.12% Liquid 5 milliLiter(s) Oral Mucosa two times a day  chlorhexidine 2% Cloths 1 Application(s) Topical <User Schedule>  clopidogrel Tablet 75 milliGRAM(s) Oral daily  pantoprazole    Tablet 40 milliGRAM(s) Oral before breakfast  piperacillin/tazobactam IVPB.. 4.5 Gram(s) IV Intermittent every 8 hours  polyethylene glycol 3350 17 Gram(s) Oral daily  potassium chloride   Powder 40 milliEquivalent(s) Oral once  potassium phosphate IVPB 30 milliMole(s) IV Intermittent once  QUEtiapine 25 milliGRAM(s) Oral daily    MEDICATIONS  (PRN):    PHYSICAL EXAM:  GENERAL: Well-appearing elderly female  HEAD:  Atraumatic, Normocephalic, NGT in place  EYES: PERRLA, conjunctiva and sclera clear  NECK: Supple  NERVOUS SYSTEM:  Alert & Awake.   CHEST/LUNG: B/L good air entry; rhonchi auscultated  HEART: mechanical murmur auscultated; aortic stenosis murmur 2nd R ICS  ABDOMEN: Soft, Nontender, Nondistended; Bowel sounds present  EXTREMITIES:  2+ pulses b/l  SKIN: No rashes or lesions  Care Discussed with Consultants/Other Providers [ x] YES  [ ] NO

## 2023-07-03 NOTE — PROGRESS NOTE ADULT - SUBJECTIVE AND OBJECTIVE BOX
PULMONARY CONSULT SERVICE FOLLOW-UP NOTE    INTERVAL HPI:  Reviewed chart and overnight events; patient seen and examined at bedside. Comfortable. No SOB. No recurrence of hemoptysis.    MEDICATIONS:  Pulmonary:  albuterol/ipratropium for Nebulization 3 milliLiter(s) Nebulizer every 6 hours  budesonide  80 MICROgram(s)/formoterol 4.5 MICROgram(s) Inhaler 2 Puff(s) Inhalation two times a day    Antimicrobials:  piperacillin/tazobactam IVPB.. 4.5 Gram(s) IV Intermittent every 8 hours    Anticoagulants:  aspirin  chewable 81 milliGRAM(s) Oral every 24 hours  clopidogrel Tablet 75 milliGRAM(s) Oral daily    Allergies    Bactrim (Other)  Shrimp (Unknown)  sulfa drugs (Unknown)  Lobster (Unknown)  unknown (Ototoxicity)    Vital Signs Last 24 Hrs  T(C): 37.8 (03 Jul 2023 09:10), Max: 38.2 (02 Jul 2023 21:00)  T(F): 100 (03 Jul 2023 09:10), Max: 100.8 (02 Jul 2023 21:00)  HR: 93 (03 Jul 2023 10:00) (85 - 100)  BP: 114/58 (03 Jul 2023 10:00) (99/53 - 131/59)  BP(mean): 80 (03 Jul 2023 10:00) (72 - 86)  RR: 18 (03 Jul 2023 10:00) (18 - 38)  SpO2: 97% (03 Jul 2023 10:00) (91% - 99%)    Parameters below as of 03 Jul 2023 10:00  Patient On (Oxygen Delivery Method): nasal cannula w/ humidification  O2 Flow (L/min): 4      07-02 @ 07:01  -  07-03 @ 07:00  --------------------------------------------------------  IN: 650.8 mL / OUT: 2005 mL / NET: -1354.2 mL    07-03 @ 07:01 - 07-03 @ 11:28  --------------------------------------------------------  IN: 83.3 mL / OUT: 0 mL / NET: 83.3 mL    PHYSICAL EXAM:  Constitutional: WD  HEENT: NC/AT; anicteric sclera; MMM  Cardiovascular: +S1/S2, RRR  Respiratory: bilateral rhonchi; no W/R  Gastrointestinal: soft, NT/ND  Extremities: WWP; no edema, clubbing or cyanosis  Vascular: 2+ radial and pedal pulses  Neurological: AAOx3; no focal deficits    LABS:  CBC Full  -  ( 03 Jul 2023 05:56 )  WBC Count : 9.45 K/uL  RBC Count : 3.42 M/uL  Hemoglobin : 10.5 g/dL  Hematocrit : 31.9 %  Platelet Count - Automated : 156 K/uL  Mean Cell Volume : 93.3 fl  Mean Cell Hemoglobin : 30.7 pg  Mean Cell Hemoglobin Concentration : 32.9 gm/dL  Auto Neutrophil # : 7.58 K/uL  Auto Lymphocyte # : 1.38 K/uL  Auto Monocyte # : 0.41 K/uL  Auto Eosinophil # : 0.09 K/uL  Auto Basophil # : 0.00 K/uL  Auto Neutrophil % : 80.2 %  Auto Lymphocyte % : 14.6 %  Auto Monocyte % : 4.3 %  Auto Eosinophil % : 0.9 %  Auto Basophil % : 0.0 %    07-03    141  |  104  |  7   ----------------------------<  131<H>  3.2<L>   |  26  |  0.51    Ca    8.2<L>      03 Jul 2023 05:56  Phos  1.9     07-03  Mg     2.0     07-03    TPro  6.2  /  Alb  2.6<L>  /  TBili  0.8  /  DBili  x   /  AST  20  /  ALT  11  /  AlkPhos  88  07-03    PTT - ( 02 Jul 2023 05:12 )  PTT:33.4 sec      Urinalysis Basic - ( 03 Jul 2023 05:56 )    Color: x / Appearance: x / SG: x / pH: x  Gluc: 131 mg/dL / Ketone: x  / Bili: x / Urobili: x   Blood: x / Protein: x / Nitrite: x   Leuk Esterase: x / RBC: x / WBC x   Sq Epi: x / Non Sq Epi: x / Bacteria: x    RADIOLOGY & ADDITIONAL STUDIES: Reviewed.

## 2023-07-03 NOTE — PROGRESS NOTE ADULT - PROBLEM SELECTOR PLAN 3
S/p CABG  Known, takes ASA 81mg qd and Atorvastatin 20mg qhs at home.    - per CTS on ASA 81mg qd and Plavix 75mg qd while inpatient  - NGT today and continue ASA + Plavix  - Atorvastatin resumed once extubated S/p CABG. Known, takes ASA 81mg qd and Atorvastatin 20mg qhs at home.  - per CTS on ASA 81mg qd and Plavix 75mg qd while inpatient  - NGT today and continue ASA + Plavix  - Atorvastatin once cleared by S&S

## 2023-07-03 NOTE — SWALLOW BEDSIDE ASSESSMENT ADULT - NS SPL SWALLOW CLINIC TRIAL FT
Pharyngeal stage is significant for overt signs of airway protection deficits as trials progressed which can indicate pharyngeal inefficiency. Pt with wet cough and expectoration of some PO trials. Recommend to continue NPO with NGT and initiate ice chips in moderation for therapeutic swallows. This service will re-assess in 24 hours. All questions were answered.

## 2023-07-03 NOTE — PROGRESS NOTE ADULT - SUBJECTIVE AND OBJECTIVE BOX
*************Transfer from MICU-->RMF***************    Hospital Course: Ms. Vazquez is a 71yo Cantonese-speaking F with a PMHx of HTN, HLD, CAD s/p bioprosthetic AVR and CABG (11/2018 SVG-RCA, AVR Catalan 21mm), endocarditis (on lifelong penicillin), AF (Warfarin), AAA, PVD, bronchiectasis, presented 6/28 w/ worsening cough w/ hemoptysis, now s/p ENT scope showing no bleed source. Also s/p Watchman and worsening hemoptysis and transferred to MICU. Intubated and started levophed 6/29, s/p bronch x2 showing blood-filled R lung without confirmed source of bleeding. Started zosyn given purulent secretions from bronch. Extubated and weaned off pressors 7/2. Failed dysphagia screen and S&S rec'd NPO w/ NGT pending further eval. VSS and stable for stepdown to [facility] for further management.    Overnight: Tmax at 100.8, given IV tylenol. Bladder scan ovn showed 350s, px required straight catheterization.    SUBJECTIVE: Patient seen and examined at bedside.       Vital Signs Last 24 Hrs  T(C): 36.7 (03 Jul 2023 14:28), Max: 38.2 (02 Jul 2023 21:00)  T(F): 98.1 (03 Jul 2023 14:28), Max: 100.8 (02 Jul 2023 21:00)  HR: 91 (03 Jul 2023 15:00) (84 - 100)  BP: 118/65 (03 Jul 2023 15:00) (102/53 - 134/61)  BP(mean): 94 (03 Jul 2023 15:00) (74 - 94)  RR: 20 (03 Jul 2023 15:00) (18 - 32)  SpO2: 92% (03 Jul 2023 15:00) (92% - 99%)    Parameters below as of 03 Jul 2023 15:00  Patient On (Oxygen Delivery Method): room air    .  VITAL SIGNS:  T(C): 36.7 (07-03-23 @ 14:28), Max: 38.2 (07-02-23 @ 21:00)  T(F): 98.1 (07-03-23 @ 14:28), Max: 100.8 (07-02-23 @ 21:00)  HR: 91 (07-03-23 @ 15:00) (84 - 100)  BP: 118/65 (07-03-23 @ 15:00) (102/53 - 134/61)  BP(mean): 94 (07-03-23 @ 15:00) (74 - 94)  RR: 20 (07-03-23 @ 15:00) (18 - 32)  SpO2: 92% (07-03-23 @ 15:00) (92% - 99%)  Wt(kg): --    PHYSICAL EXAM:    Constitutional: WDWN resting comfortably in bed; NAD  Head: NC/AT  Eyes: PERRL, EOMI, anicteric sclera  ENT: no nasal discharge; uvula midline, no oropharyngeal erythema or exudates; MMM  Neck: supple; no JVD or thyromegaly  Respiratory: CTA B/L; no W/R/R, no retractions  Cardiac: +S1/S2; RRR; no M/R/G; PMI non-displaced  Gastrointestinal: soft, NT/ND; no rebound or guarding; +BSx4  Genitourinary: normal external genitalia  Back: spine midline, no bony tenderness or step-offs; no CVAT B/L  Extremities: WWP, no clubbing or cyanosis; no peripheral edema  Musculoskeletal: NROM x4; no joint swelling, tenderness or erythema  Vascular: 2+ radial, femoral, DP/PT pulses B/L  Dermatologic: skin warm, dry and intact; no rashes, wounds, or scars  Lymphatic: no submandibular or cervical LAD  Neurologic: AAOx3; CNII-XII grossly intact; no focal deficits  Psychiatric: affect and characteristics of appearance, verbalizations, behaviors are appropriate    Labs:                        10.5   9.45  )-----------( 156      ( 03 Jul 2023 05:56 )             31.9       07-03    141  |  104  |  7   ----------------------------<  131<H>  3.2<L>   |  26  |  0.51    Ca    8.2<L>      03 Jul 2023 05:56  Phos  1.9     07-03  Mg     2.0     07-03    TPro  6.2  /  Alb  2.6<L>  /  TBili  0.8  /  DBili  x   /  AST  20  /  ALT  11  /  AlkPhos  88  07-03         *************Transfer from MICU-->F***************    Hospital Course: Ms. Vazquez is a 71yo Cantonese-speaking F with a PMHx of HTN, HLD, CAD s/p bioprosthetic AVR and CABG (11/2018 SVG-RCA, AVR Catalan 21mm), endocarditis (on lifelong penicillin), AF (Warfarin), AAA, PVD, bronchiectasis, presented 6/28 w/ worsening cough w/ hemoptysis, now s/p ENT scope showing no bleed source. Also s/p Watchman and worsening hemoptysis and transferred to MICU. Intubated and started levophed 6/29, s/p bronch x2 showing blood-filled R lung without confirmed source of bleeding. Started zosyn given purulent secretions from bronch. Extubated and weaned off pressors 7/2. Failed dysphagia screen and S&S rec'd NPO w/ NGT pending further eval. VSS and stable for stepdown to [facility] for further management.    Overnight: Tmax at 100.8, given IV tylenol. Bladder scan ovn showed 350s, px required straight catheterization.    SUBJECTIVE: Patient seen and examined at bedside. She is still intermittently coughing up clear-colored phlegm with no blood. Denies fever, chills, headache, chest pain, abdominal pain. Has regular bowel movements and has a smith in place which she states causes her some lower abdominal pain.       Vital Signs Last 24 Hrs  T(C): 36.7 (03 Jul 2023 14:28), Max: 38.2 (02 Jul 2023 21:00)  T(F): 98.1 (03 Jul 2023 14:28), Max: 100.8 (02 Jul 2023 21:00)  HR: 91 (03 Jul 2023 15:00) (84 - 100)  BP: 118/65 (03 Jul 2023 15:00) (102/53 - 134/61)  BP(mean): 94 (03 Jul 2023 15:00) (74 - 94)  RR: 20 (03 Jul 2023 15:00) (18 - 32)  SpO2: 92% (03 Jul 2023 15:00) (92% - 99%)    Parameters below as of 03 Jul 2023 15:00  Patient On (Oxygen Delivery Method): room air    PHYSICAL EXAM:    Constitutional: WDWN resting comfortably sitting upright in chair   Head: NC/AT  Eyes: PERRL, EOMI, anicteric sclera  ENT: no nasal discharge; uvula midline, no oropharyngeal erythema or exudates; MMM  Neck: supple; no JVD or thyromegaly  Respiratory: CTA B/L; no W/R/R, no retractions  Cardiac: +S1/S2; RRR; no M/R/G; PMI non-displaced  Gastrointestinal: soft, NT/ND; no rebound or guarding; +BSx4  Genitourinary: normal external genitalia  Back: spine midline, no bony tenderness or step-offs; no CVAT B/L  Extremities: WWP, no clubbing or cyanosis; no peripheral edema  Musculoskeletal: NROM x4; no joint swelling, tenderness or erythema  Vascular: 2+ radial, femoral, DP/PT pulses B/L  Dermatologic: skin warm, dry and intact; no rashes, wounds, or scars  Lymphatic: no submandibular or cervical LAD  Neurologic: AAOx3; CNII-XII grossly intact; no focal deficits  Psychiatric: affect and characteristics of appearance, verbalizations, behaviors are appropriate    Labs:                        10.5   9.45  )-----------( 156      ( 03 Jul 2023 05:56 )             31.9       07-03    141  |  104  |  7   ----------------------------<  131<H>  3.2<L>   |  26  |  0.51    Ca    8.2<L>      03 Jul 2023 05:56  Phos  1.9     07-03  Mg     2.0     07-03    TPro  6.2  /  Alb  2.6<L>  /  TBili  0.8  /  DBili  x   /  AST  20  /  ALT  11  /  AlkPhos  88  07-03         *************Transfer from MICU-->F***************    Hospital Course: Ms. Vazquez is a 73yo Cantonese-speaking F with a PMHx of HTN, HLD, CAD s/p bioprosthetic AVR and CABG (11/2018 SVG-RCA, AVR Catalan 21mm), endocarditis (on lifelong penicillin), AF (Warfarin), AAA, PVD, bronchiectasis, presented 6/28 w/ worsening cough w/ hemoptysis, now s/p ENT scope showing no bleed source. Also s/p Watchman and worsening hemoptysis and transferred to MICU. Intubated and started levophed 6/29, s/p bronch x2 showing blood-filled R lung without confirmed source of bleeding. Started zosyn given purulent secretions from bronch. Extubated and weaned off pressors 7/2. Failed dysphagia screen and S&S rec'd NPO w/ NGT pending further eval. VSS and stable for stepdown to [facility] for further management.    SUBJECTIVE: Patient seen and examined at bedside. She is still intermittently coughing up clear-colored phlegm with no blood. Denies fever, chills, headache, chest pain, abdominal pain. Has regular bowel movements and has a smith in place which she states causes her some lower abdominal pain.       Vital Signs Last 24 Hrs  T(C): 36.7 (03 Jul 2023 14:28), Max: 38.2 (02 Jul 2023 21:00)  T(F): 98.1 (03 Jul 2023 14:28), Max: 100.8 (02 Jul 2023 21:00)  HR: 91 (03 Jul 2023 15:00) (84 - 100)  BP: 118/65 (03 Jul 2023 15:00) (102/53 - 134/61)  BP(mean): 94 (03 Jul 2023 15:00) (74 - 94)  RR: 20 (03 Jul 2023 15:00) (18 - 32)  SpO2: 92% (03 Jul 2023 15:00) (92% - 99%)    Parameters below as of 03 Jul 2023 15:00  Patient On (Oxygen Delivery Method): room air    I&O's Summary    02 Jul 2023 07:01  -  03 Jul 2023 07:00  --------------------------------------------------------  IN: 650.8 mL / OUT: 2005 mL / NET: -1354.2 mL    03 Jul 2023 07:01  -  03 Jul 2023 16:15  --------------------------------------------------------  IN: 624.8 mL / OUT: 0 mL / NET: 624.8 mL        PHYSICAL EXAM:        Labs:                        10.5   9.45  )-----------( 156      ( 03 Jul 2023 05:56 )             31.9       07-03    141  |  104  |  7   ----------------------------<  131<H>  3.2<L>   |  26  |  0.51    Ca    8.2<L>      03 Jul 2023 05:56  Phos  1.9     07-03  Mg     2.0     07-03    TPro  6.2  /  Alb  2.6<L>  /  TBili  0.8  /  DBili  x   /  AST  20  /  ALT  11  /  AlkPhos  88  07-03         *************Transfer from MICU-->F***************    Hospital Course: Ms. Vazquez is a 71yo Cantonese-speaking F with a PMHx of HTN, HLD, CAD s/p bioprosthetic AVR and CABG (11/2018 SVG-RCA, AVR Catalan 21mm), endocarditis (on lifelong penicillin), AF (Warfarin), AAA, PVD, bronchiectasis, presented 6/28 w/ worsening cough w/ hemoptysis, now s/p ENT scope showing no bleed source. Also s/p Watchman and worsening hemoptysis and transferred to MICU. Intubated and started levophed 6/29, s/p bronch x2 showing blood-filled R lung without confirmed source of bleeding. Started zosyn given purulent secretions from bronch. Extubated and weaned off pressors 7/2. Failed dysphagia screen and S&S rec'd NPO w/ NGT pending further eval. VSS and stable for stepdown to [facility] for further management.    SUBJECTIVE: Patient seen and examined at bedside. She is still intermittently coughing up clear-colored phlegm with no blood. Denies fever, chills, headache, chest pain, abdominal pain. Has regular bowel movements and has a smith in place which she states causes her some lower abdominal pain.       Vital Signs Last 24 Hrs  T(C): 36.7 (03 Jul 2023 14:28), Max: 38.2 (02 Jul 2023 21:00)  T(F): 98.1 (03 Jul 2023 14:28), Max: 100.8 (02 Jul 2023 21:00)  HR: 91 (03 Jul 2023 15:00) (84 - 100)  BP: 118/65 (03 Jul 2023 15:00) (102/53 - 134/61)  BP(mean): 94 (03 Jul 2023 15:00) (74 - 94)  RR: 20 (03 Jul 2023 15:00) (18 - 32)  SpO2: 92% (03 Jul 2023 15:00) (92% - 99%)    Parameters below as of 03 Jul 2023 15:00  Patient On (Oxygen Delivery Method): room air    I&O's Summary    02 Jul 2023 07:01  -  03 Jul 2023 07:00  --------------------------------------------------------  IN: 650.8 mL / OUT: 2005 mL / NET: -1354.2 mL    03 Jul 2023 07:01  -  03 Jul 2023 16:15  --------------------------------------------------------  IN: 624.8 mL / OUT: 0 mL / NET: 624.8 mL        PHYSICAL EXAM:    PHYSICAL EXAM:  GENERAL: Well-appearing elderly female sitting upright in chair  HEAD:  Atraumatic, Normocephalic, NGT in place  EYES: PERRLA, conjunctiva and sclera clear  NECK: Supple  NERVOUS SYSTEM:  Alert & Awake.   CHEST/LUNG: B/L good air entry; rhonchi auscultated  HEART: mechanical murmur auscultated; aortic stenosis murmur 2nd R ICS  ABDOMEN: Soft,  Nondistended; mild tenderness to palpation of lower abdomen; Bowel sounds present  EXTREMITIES:  2+ pulses b/l  SKIN: No rashes or lesions    Labs:                        10.5   9.45  )-----------( 156      ( 03 Jul 2023 05:56 )             31.9       07-03    141  |  104  |  7   ----------------------------<  131<H>  3.2<L>   |  26  |  0.51    Ca    8.2<L>      03 Jul 2023 05:56  Phos  1.9     07-03  Mg     2.0     07-03    TPro  6.2  /  Alb  2.6<L>  /  TBili  0.8  /  DBili  x   /  AST  20  /  ALT  11  /  AlkPhos  88  07-03

## 2023-07-03 NOTE — SWALLOW BEDSIDE ASSESSMENT ADULT - SLP PERTINENT HISTORY OF CURRENT PROBLEM
h/o CAD, HLD, CABG, endocarditis, presented with bronchiectasis, worsening cough with hemoptysis, intubated 6/29-7/2; blood filled R lung on bronchoscopy,

## 2023-07-03 NOTE — PROGRESS NOTE ADULT - ASSESSMENT
71yo Cantonese-speaking F PMH HTN, HLD, CAD s/p bioAVR/CABG (11/2018 SVG-RCA, Catalan Inspiris 21mm), endocarditis (lifelong penicillin), AF on warfarin, AAA, PVD, bronchiectasis ( on Breo Ellipita and Proair) with worsening cough and hemoptysis on warfarin, s/p watchman device placement ( 6/28), postop Day 1 c/b worsening cough with hemoptysis, ENT scope showing no bleeding source, and CTA Chest did not reveal source of bleed, brought to MICU ( 6/29) intubated ( 6/29) for bronch with findings of blood in lungs, IR unable to embolize. Pt was given Trnaexamic acid nebs, repeat Bronch ( 7/1) showed no active bleeding seen, frail tissues/inflammation, BAL with Pseudomonas and started on Zosyn ( 6/20 @ 2200), clinically improving and extubated on ( 7/2 am), kept NGT/NPO.      PMD:   family contact :  Joseph Vazquez [phone] --   care by jacquelin/pulmonary  appreciated, dw pul, son updated- called son from patient bedside, thus allowing patient to hear her  and son, family will be visiting patient today.            Attestation Statements:   Attestation Statements:  Time-based billing (NON-critical care).     50 minutes spent on total encounter. The necessity of the time spent during the encounter on this date of service was due to:     reviewing the note/labs/imaging, discussion with family, sub-specialities.   73yo Cantonese-speaking F PMH HTN, HLD, CAD s/p bioAVR/CABG (11/2018 SVG-RCA, Catalan Inspiris 21mm), endocarditis (lifelong penicillin), AF on warfarin, AAA, PVD, COPD/ bronchiectasis ( on Breo Ellipita and Proair) with worsening cough and hemoptysis on warfarin, s/p watchman device placement ( 6/28), postop Day 1 c/b worsening cough with hemoptysis, ENT scope showing no bleeding source, and CTA Chest did not reveal source of bleed, brought to MICU ( 6/29) intubated/paralyzed ( 6/29) for bronch with findings of blood-filled R lung, IR unable to embolize. Pt was given Trnaexamic acid nebs, repeat Bronch ( 7/1) showed no active bleeding seen, frail tissues/inflammation, BAL with Pseudomonas and started on Zosyn ( 6/20 @ 2200), clinically improving and extubated on ( 7/2 am).    # COPD  # Bronchiectasis exacerbation ( Pseudomonas)   # Hemoptysis   # CAD s/p CABG   # BioAVR   # Hx chronic Endocarditis ( on lifelong PCN)   # AFib s/p Watchman device ( 6/28)   # HTN  # HLD  # Depression/Anxiety    - HD stable and plan for transferred out of MICU ( 7/3) to [facility]   - Pulm f/u appreciated   - Bronchiectasis workup per Pulm: alpha 1 antitrypsin, ANCAs, aspergillus ab, CCP, CF expanded panel, IgE total, immunoglobulins, protein electrophoresis, RF, and Sjogrens  -s/p Cefazolin 2g q8h x5 doses (from OR after Watchman)  - c/w anti-pseudomonal coverage: piperacillin/tazobactam IVPB.. 4.5 Gram(s) IV Intermittent every 8 hours ( 6/30 @ 2200) Day 3   - WBC 0.45K ( 7/3) WNL   - BCx: NGTD  - transition VM to NC this am, titrate O2 to keep SpO2>90% RA  - ICS/LABA: budesonide  80 MICROgram(s)/formoterol 4.5 MICROgram(s) Inhaler 2 Puff(s) Inhalation two times a day  ( uses Albuterol 90mcg/inh 2 puffs q4h PRN and Breo Ellipta 100/25 mcg/INH at home)   - STEWART/ROSSY: albuterol/ipratropium for Nebulization 3 milliLiter(s) Nebulizer every 6 hours ( Proair prn at home)   - consider mucolytic: chest PT/guaifenasin   - s/p Extubation (7/2)  - SLP eval appreciated( 7/3): keep NPO/strict oral higiene, SLP f/u tomorrow ( 7/4)   - resume NGT feed please   - DAPT: c/w ASA 81mg and Clopidogrel 75mg po daily per CTS  - HLD: Atorvastatin 20mg po qHS ( held while NPO)   - hx Anxiety:        clonazePAM  Tablet 0.5 milliGRAM(s) Oral two times a day       busPIRone 7.5 milliGRAM(s) Oral q HS <User Schedule>       QUEtiapine 25 milliGRAM(s) Oral daily   - Ruiz d/c'd ( 7/3), f/u TOV and bladder scan     N: NPO/NGT feed  GI ppx: protonix 40mg  DVT ppx: holding pharmacologic ppx for now given hemoptysis; SCDs  Code status: FULL CODE    Dispo: MICU-> RMF, medically active, NPO/NGT/SLP f/u (7/4), c/w PIP/TAZO, to d/w Pulm re: duration of abx and possibility of transitioning to oral abx upon d/c     PMD: Dr.Helang Philippe Marvin ( [facility]) [phone]  Pulm; Dr. Hood Cook 9 [facility])  [facility] Cardiology: Dr. Mitul Gonzalez/ Dr. Nubia Minor [phone]  Family contact: Joseph Vazquez [phone]    d/w MICU team    71yo Cantonese-speaking F PMH HTN, HLD, CAD s/p bioAVR/CABG (11/2018 SVG-RCA, Catalan Inspiris 21mm), endocarditis (lifelong penicillin), AF on warfarin, AAA, PVD, COPD/ bronchiectasis ( on Breo Ellipita and Proair) with worsening cough and hemoptysis on warfarin, s/p watchman device placement ( 6/28), postop Day 1 c/b worsening cough with hemoptysis, ENT scope showing no bleeding source, and CTA Chest did not reveal source of bleed, brought to MICU ( 6/29) intubated/paralyzed ( 6/29) for bronch with findings of blood-filled R lung, IR unable to embolize. Pt was given Trnaexamic acid nebs, repeat Bronch ( 7/1) showed no active bleeding seen, frail tissues/inflammation, BAL with Pseudomonas and started on Zosyn ( 6/20 @ 2200), clinically improving and extubated on ( 7/2 am).    # COPD  # Bronchiectasis exacerbation ( Pseudomonas)   # Massive hemoptysis   # Acute hypoxic respiratory failure   # Dysphagia  # CAD s/p CABG   # BioAVR   # Hx chronic Endocarditis ( on lifelong PCN)   # AFib s/p Watchman device ( 6/28)   # HTN  # HLD  # Depression/Anxiety    - HD stable and plan for transferred out of MICU ( 7/3) to [facility]   - Pulm f/u appreciated   - Bronchiectasis workup per Pulm: alpha 1 antitrypsin, ANCAs, aspergillus ab, CCP, CF expanded panel, IgE total, immunoglobulins, protein electrophoresis, RF, and Sjogrens  -s/p Cefazolin 2g q8h x5 doses (from OR after Watchman)  - c/w anti-pseudomonal coverage: piperacillin/tazobactam IVPB.. 4.5 Gram(s) IV Intermittent every 8 hours ( 6/30 @ 2200) Day 3, to d/w Pulm re: duration of iv abx and possible transition to oral abx coverage before going back to PCN lifetime   - WBC 0.45K ( 7/3) WNL   - BCx: NGTD  - transition VM to NC this am, titrate O2 to keep SpO2>90% RA  - ICS/LABA: budesonide  80 MICROgram(s)/formoterol 4.5 MICROgram(s) Inhaler 2 Puff(s) Inhalation two times a day  ( uses Albuterol 90mcg/inh 2 puffs q4h PRN and Breo Ellipta 100/25 mcg/INH at home)   - STEWART/ROSSY: albuterol/ipratropium for Nebulization 3 milliLiter(s) Nebulizer every 6 hours ( Proair prn at home)   - consider mucolytic: chest PT/guaifenasin   - s/p Extubation (7/2)  - SLP eval appreciated( 7/3): keep NPO for dysphagia, strict oral hygiene, SLP f/u tomorrow ( 7/4)   - resume NGT feed please   - DAPT: c/w ASA 81mg and Clopidogrel 75mg po daily per CTS  - HLD: Atorvastatin 20mg po qHS ( held while NPO)   - hx Anxiety:        clonazePAM  Tablet 0.5 milliGRAM(s) Oral two times a day       busPIRone 7.5 milliGRAM(s) Oral q HS <User Schedule>       QUEtiapine 25 milliGRAM(s) Oral daily   - Ruiz d/c'd ( 7/3), f/u TOV and bladder scan     N: NPO/NGT feed  GI ppx: protonix 40mg  DVT ppx: holding pharmacologic ppx for now given hemoptysis; SCDs  Code status: FULL CODE    Dispo: MICU-> RMF, medically active, NPO/NGT/SLP f/u (7/4), c/w PIP/TAZO, to d/w Pulm re: duration of abx and possibility of transitioning to oral abx upon d/c     PMD: Dr.Helang Philippe Marvin ( [facility]) [phone]  Pulm; Dr. Hood Cook 9 [facility])  [facility] Cardiology: Dr. Mitul Gonzalez/ Dr. Nubia Minor [phone]  Family contact: Joseph Vazquez [phone]    d/w MICU team

## 2023-07-03 NOTE — PROGRESS NOTE ADULT - PROBLEM SELECTOR PLAN 7
PPX  F: none  E: replenish PRN  N: NPO  GI ppx: protonix 40mg  DVT ppx: heparin  Code status: FULL CODE  Dispo: MICU PPX  F: none  E: replenish PRN  N: NPO  GI ppx: protonix 40mg  DVT ppx: aspirin and plavix  Code status: FULL CODE  [facility]

## 2023-07-03 NOTE — PROGRESS NOTE ADULT - PROBLEM SELECTOR PLAN 5
Known chronic endocarditis, on life long Penicillin     - s/p Cefazolin 2g q8h x5 doses (from OR after Watchman)  - started on Zosyn for antipseudomonal coverage, also sufficient for tx of chronic endocarditis Known chronic endocarditis, on life long Penicillin     - s/p Cefazolin 2g q8h x5 doses (from OR after Watchman)  - c/w Zosyn for antipseudomonal coverage, also sufficient for tx of chronic endocarditis  - resume penicillin s/p course of zosyn  - heparin gtt for now while NPO Known chronic endocarditis, on life long Penicillin     - s/p Cefazolin 2g q8h x5 doses (from OR after Watchman)  - c/w Zosyn for antipseudomonal coverage, also sufficient for tx of chronic endocarditis  - resume penicillin s/p course of zosyn

## 2023-07-03 NOTE — PROGRESS NOTE ADULT - ASSESSMENT
71 y/o Female with PMHx of HTN, HLD, CAD s/p bioAVR/CABG with Dr. Mitul Ly (11/2018 SVG-RCA, Catalan Inspiris 21mm), hx of Endocarditis on lifelong penicillin, AFib, AAA, PVD, presented with bronchiectasis worsening cough with hemoptysis on warfarin which has been stopped, who was referred to Dr. Nubia Minor for pLAAo evaluation. On 6/28 Pt underwent watchman device placement.  Immediately post op developed some bradycardia and hypotension which resolved after she emerged from anesthesia. Pt developed some worsening hemoptysis post op. Pulmonology was consulted, pt transferred to MICU. Bronchoscopy significant for suspected RLL bleed.  IR unable to embolize. CTA chest did not reveal source of bleed. ENT scop unrevealed. Hemoptysis improving. Abx started for pseudomonas in sputum.        Groins are CDI, Post procedure TTE without pericardial effusion.   Pt can follow up with Dr. Nubia Minor 1 week after discharge.   CTS will sign off for now, feel free to reconsult with any questions or concerns regarding AC

## 2023-07-03 NOTE — SWALLOW BEDSIDE ASSESSMENT ADULT - COMMENTS
Pt awake, alert, verbal, Cantonese speaking. Assessment conducted with help of [employer] phone  ID # [id number]

## 2023-07-03 NOTE — PROGRESS NOTE ADULT - SUBJECTIVE AND OBJECTIVE BOX
Patient is a 72y old  Female who presents with a chief complaint of I48.91     (30 Jun 2023 15:41)    INTERVAL EVENTS: NAEON    SUBJECTIVE:  Encountered time: 8:00am  Patient was seen and examined at bedside. Pt resting comfortably in bed on facemask/NGT in place. AAOx3, reports productive sputum, otherwise denies SOB,CP,etc.     Review of systems: No fever, chills, dizziness, HA, Changes in vision, CP, dyspnea, nausea or vomiting, dysuria, changes in bowel movements, LE edema. Rest of 12 point Review of systems negative unless otherwise documented elsewhere in note.         MEDICATIONS:  MEDICATIONS  (STANDING):  albuterol/ipratropium for Nebulization 3 milliLiter(s) Nebulizer every 6 hours  aspirin  chewable 81 milliGRAM(s) Oral every 24 hours  atorvastatin 20 milliGRAM(s) Oral at bedtime  budesonide  80 MICROgram(s)/formoterol 4.5 MICROgram(s) Inhaler 2 Puff(s) Inhalation two times a day  busPIRone 7.5 milliGRAM(s) Oral <User Schedule>  chlorhexidine 0.12% Liquid 5 milliLiter(s) Oral Mucosa two times a day  chlorhexidine 2% Cloths 1 Application(s) Topical <User Schedule>  clonazePAM  Tablet 0.5 milliGRAM(s) Oral two times a day  clopidogrel Tablet 75 milliGRAM(s) Oral daily  pantoprazole    Tablet 40 milliGRAM(s) Oral before breakfast  piperacillin/tazobactam IVPB.. 4.5 Gram(s) IV Intermittent every 8 hours  polyethylene glycol 3350 17 Gram(s) Oral daily  QUEtiapine 25 milliGRAM(s) Oral daily    MEDICATIONS  (PRN):      Allergies    Bactrim (Other)  Shrimp (Unknown)  sulfa drugs (Unknown)  Lobster (Unknown)  unknown (Ototoxicity)    Intolerances        OBJECTIVE:  Vital Signs Last 24 Hrs  T(C): 37.8 (03 Jul 2023 09:10), Max: 38.2 (02 Jul 2023 21:00)  T(F): 100 (03 Jul 2023 09:10), Max: 100.8 (02 Jul 2023 21:00)  HR: 87 (03 Jul 2023 12:00) (84 - 100)  BP: 120/56 (03 Jul 2023 12:00) (99/53 - 129/55)  BP(mean): 81 (03 Jul 2023 12:00) (72 - 86)  RR: 18 (03 Jul 2023 12:00) (18 - 38)  SpO2: 95% (03 Jul 2023 12:00) (91% - 99%)    Parameters below as of 03 Jul 2023 12:00  Patient On (Oxygen Delivery Method): nasal cannula w/ humidification  O2 Flow (L/min): 2    I&O's Summary    02 Jul 2023 07:01  -  03 Jul 2023 07:00  --------------------------------------------------------  IN: 650.8 mL / OUT: 2005 mL / NET: -1354.2 mL    03 Jul 2023 07:01  -  03 Jul 2023 13:00  --------------------------------------------------------  IN: 349.9 mL / OUT: 0 mL / NET: 349.9 mL        PHYSICAL EXAM:  Gen: Reclining in bed at time of exam, appears stated age  HEENT: NCAT, MMM, clear OP, NGT in place   Neck: supple, trachea at midline  CV: RRR, +S1/S2  Pulm: adequate respiratory effort, no increase in work of breathing  Abd: soft, NTND  Skin: warm and dry,   Ext: WWP, no LE edema  Neuro: AOx3, no gross focal neurological deficits  Psych: affect and behavior appropriate, pleasant at time of interview    LABS:                        10.5   9.45  )-----------( 156      ( 03 Jul 2023 05:56 )             31.9     07-03    141  |  104  |  7   ----------------------------<  131<H>  3.2<L>   |  26  |  0.51    Ca    8.2<L>      03 Jul 2023 05:56  Phos  1.9     07-03  Mg     2.0     07-03    TPro  6.2  /  Alb  2.6<L>  /  TBili  0.8  /  DBili  x   /  AST  20  /  ALT  11  /  AlkPhos  88  07-03    LIVER FUNCTIONS - ( 03 Jul 2023 05:56 )  Alb: 2.6 g/dL / Pro: 6.2 g/dL / ALK PHOS: 88 U/L / ALT: 11 U/L / AST: 20 U/L / GGT: x           PTT - ( 02 Jul 2023 05:12 )  PTT:33.4 sec  CAPILLARY BLOOD GLUCOSE        Urinalysis Basic - ( 03 Jul 2023 05:56 )    Color: x / Appearance: x / SG: x / pH: x  Gluc: 131 mg/dL / Ketone: x  / Bili: x / Urobili: x   Blood: x / Protein: x / Nitrite: x   Leuk Esterase: x / RBC: x / WBC x   Sq Epi: x / Non Sq Epi: x / Bacteria: x        MICRODATA:    Culture - Blood (collected 02 Jul 2023 05:20)  Source: .Blood Blood  Preliminary Report (03 Jul 2023 07:00):    No growth at 1 day.    Culture - Fungal, Bronchial (collected 01 Jul 2023 11:34)  Source: .Bronchial None  Preliminary Report (03 Jul 2023 10:39):    Testing in progress    Culture - Acid Fast - Bronchial w/Smear (collected 01 Jul 2023 11:34)  Source: .Bronchial None    Culture - Bronchial (collected 01 Jul 2023 11:34)  Source: .Bronchial None  Gram Stain (01 Jul 2023 16:33):    No epithelial cells seen    Moderate White blood cells    Rare to few Gram Negative Rods  Final Report (02 Jul 2023 12:10):    Few Pseudomonas aeruginosa    See previous culture for susceptibility    No routine respiratory ana Isolated    Culture - Fungal, Bronchial (collected 01 Jul 2023 11:34)  Source: .Bronchial None  Preliminary Report (03 Jul 2023 10:37):    Testing in progress    Culture - Bronchial (collected 01 Jul 2023 11:34)  Source: .Bronchial None  Gram Stain (01 Jul 2023 16:39):    No epithelial cells seen    Numerous White blood cells    Few Gram Negative Rods  Final Report (02 Jul 2023 12:08):    Numerous Pseudomonas aeruginosa    See previous culture for susceptibility    No routine respiratory ana Isolated    Culture - Acid Fast - Bronchial w/Smear (collected 01 Jul 2023 11:34)  Source: .Bronchial None        RADIOLOGY/OTHER STUDIES:

## 2023-07-03 NOTE — PROGRESS NOTE ADULT - SUBJECTIVE AND OBJECTIVE BOX
Patient discussed on morning rounds with      Operation / Date: Watchman     SUBJECTIVE ASSESSMENT:  72y Female seen and examined at the bedside, offers no acute complaints. Denies pain at the groin site.         Vital Signs Last 24 Hrs  T(C): 36.7 (03 Jul 2023 14:28), Max: 38.2 (02 Jul 2023 21:00)  T(F): 98.1 (03 Jul 2023 14:28), Max: 100.8 (02 Jul 2023 21:00)  HR: 91 (03 Jul 2023 15:00) (84 - 100)  BP: 118/65 (03 Jul 2023 15:00) (102/53 - 134/61)  BP(mean): 94 (03 Jul 2023 15:00) (74 - 94)  RR: 20 (03 Jul 2023 15:00) (18 - 32)  SpO2: 92% (03 Jul 2023 15:00) (92% - 99%)    Parameters below as of 03 Jul 2023 15:00  Patient On (Oxygen Delivery Method): room air      I&O's Detail    02 Jul 2023 07:01  -  03 Jul 2023 07:00  --------------------------------------------------------  IN:    FentaNYL: 6 mL    IV PiggyBack: 375 mL    Lactated Ringers: 225 mL    Norepinephrine: 44.8 mL  Total IN: 650.8 mL    OUT:    Indwelling Catheter - Urethral (mL): 1005 mL    Intermittent Catheterization - Urethral (mL): 1000 mL    Propofol: 0 mL  Total OUT: 2005 mL    Total NET: -1354.2 mL      03 Jul 2023 07:01  -  03 Jul 2023 16:12  --------------------------------------------------------  IN:    Enteral Tube Flush: 100 mL    IV PiggyBack: 499.8 mL    IV PiggyBack: 25 mL  Total IN: 624.8 mL    OUT:  Total OUT: 0 mL    Total NET: 624.8 mL          CHEST TUBE:  no  LOUISE DRAIN: no  EPICARDIAL WIRES: no  TIE DOWNS: no    PHYSICAL EXAM:  General: Sitting in bed NAD  Head: NCAT   ENT: MMM   neck supple   Neurological: A&O x3, no focal deficits  Cardiovascular: RRR,  Respiratory: Non-labored breathing,  Gastrointestinal: soft, non-distended  Incision: R groin and R radial site CDI no hematoma       LABS:                        10.5   9.45  )-----------( 156      ( 03 Jul 2023 05:56 )             31.9       COUMADIN:  no.    PTT - ( 02 Jul 2023 05:12 )  PTT:33.4 sec    07-03    141  |  104  |  7   ----------------------------<  131<H>  3.2<L>   |  26  |  0.51    Ca    8.2<L>      03 Jul 2023 05:56  Phos  1.9     07-03  Mg     2.0     07-03    TPro  6.2  /  Alb  2.6<L>  /  TBili  0.8  /  DBili  x   /  AST  20  /  ALT  11  /  AlkPhos  88  07-03      Urinalysis Basic - ( 03 Jul 2023 05:56 )    Color: x / Appearance: x / SG: x / pH: x  Gluc: 131 mg/dL / Ketone: x  / Bili: x / Urobili: x   Blood: x / Protein: x / Nitrite: x   Leuk Esterase: x / RBC: x / WBC x   Sq Epi: x / Non Sq Epi: x / Bacteria: x        MEDICATIONS  (STANDING):  albuterol/ipratropium for Nebulization 3 milliLiter(s) Nebulizer every 6 hours  aspirin  chewable 81 milliGRAM(s) Oral every 24 hours  atorvastatin 20 milliGRAM(s) Oral at bedtime  budesonide  80 MICROgram(s)/formoterol 4.5 MICROgram(s) Inhaler 2 Puff(s) Inhalation two times a day  busPIRone 7.5 milliGRAM(s) Oral <User Schedule>  chlorhexidine 0.12% Liquid 5 milliLiter(s) Oral Mucosa two times a day  chlorhexidine 2% Cloths 1 Application(s) Topical <User Schedule>  clonazePAM  Tablet 0.5 milliGRAM(s) Oral two times a day  clopidogrel Tablet 75 milliGRAM(s) Oral daily  pantoprazole   Suspension 40 milliGRAM(s) Oral daily  piperacillin/tazobactam IVPB.. 4.5 Gram(s) IV Intermittent every 8 hours  polyethylene glycol 3350 17 Gram(s) Oral daily  QUEtiapine 25 milliGRAM(s) Oral daily    MEDICATIONS  (PRN):        RADIOLOGY & ADDITIONAL TESTS:    < from: Xray Chest 1 View- PORTABLE-Urgent (Xray Chest 1 View- PORTABLE-Urgent .) (06.30.23 @ 11:09) >  Nasogastric tube tip overlies the stomach. Endotracheal tube overlies mid   trachea at level of clavicles. Cardiac monitoring wires and pads overlie   the chest wall.    Heart Size, Mediastinum & Hilar Contours: Heart size is suboptimally   evaluated on this AP projection. Normal mediastinal and hilar contours.    Lungs: Lateral hemithorax is excluded from field-of-view. No acute lung   infiltrates visualized. No pleural effusion or pneumothorax seen.    Bones/Soft Tissues: Median sternotomy.    < end of copied text >

## 2023-07-03 NOTE — PROGRESS NOTE ADULT - ASSESSMENT
73 y/o Female with PMHx of HTN, HLD, CAD s/p bioAVR/CABG with Dr. Mitul Ly (11/2018 SVG-RCA, Catalan Inspiris 21mm), hx of Endocarditis on lifelong penicillin, AFib, AAA, PVD, presented with bronchiectasis presented for watchmen procedure. Pulmonary consulted for hemoptysis.     #Massive hemoptysis   #Bronchiectasis     Bronchiectasis without evidence of bronchial artery bleed on CT angio. Therefore likely source of bleeding was bronchiectasis. Will need workup for bronchiectasis which can be done inpatient or outpatient. Now extubated 7/2 and growing pseudomonas in sputum which is being treated with zosyn. Had several inspection bronchs and s/p paralysis in ICU for massive hemoptysis.    Plan:  - Agree with treating Pseudomonas in sputum  - She needs workup for bronchiectasis (alpha 1 antitrypsin, ANCAs, aspergillus ab, CCP, CF expanded panel, IgE total, immunoglobulins, protein electrophoresis, RF, and Sjogrens). This can be done outpatient or inpatient  - Continue excellent care of primary MICU team    Patient discussed with Dr. Larson.

## 2023-07-04 LAB
ANION GAP SERPL CALC-SCNC: 13 MMOL/L — SIGNIFICANT CHANGE UP (ref 5–17)
APTT BLD: 31.9 SEC — SIGNIFICANT CHANGE UP (ref 27.5–35.5)
BASOPHILS # BLD AUTO: 0.03 K/UL — SIGNIFICANT CHANGE UP (ref 0–0.2)
BASOPHILS NFR BLD AUTO: 0.4 % — SIGNIFICANT CHANGE UP (ref 0–2)
BLD GP AB SCN SERPL QL: NEGATIVE — SIGNIFICANT CHANGE UP
BUN SERPL-MCNC: 11 MG/DL — SIGNIFICANT CHANGE UP (ref 7–23)
CALCIUM SERPL-MCNC: 8.3 MG/DL — LOW (ref 8.4–10.5)
CHLORIDE SERPL-SCNC: 105 MMOL/L — SIGNIFICANT CHANGE UP (ref 96–108)
CO2 SERPL-SCNC: 24 MMOL/L — SIGNIFICANT CHANGE UP (ref 22–31)
CREAT SERPL-MCNC: 0.47 MG/DL — LOW (ref 0.5–1.3)
EGFR: 100 ML/MIN/1.73M2 — SIGNIFICANT CHANGE UP
EOSINOPHIL # BLD AUTO: 0.03 K/UL — SIGNIFICANT CHANGE UP (ref 0–0.5)
EOSINOPHIL NFR BLD AUTO: 0.4 % — SIGNIFICANT CHANGE UP (ref 0–6)
GLUCOSE SERPL-MCNC: 141 MG/DL — HIGH (ref 70–99)
HCT VFR BLD CALC: 32.8 % — LOW (ref 34.5–45)
HGB BLD-MCNC: 10.8 G/DL — LOW (ref 11.5–15.5)
IGE SERPL-ACNC: 126 KU/L — HIGH
IMM GRANULOCYTES NFR BLD AUTO: 0.7 % — SIGNIFICANT CHANGE UP (ref 0–0.9)
LYMPHOCYTES # BLD AUTO: 1.2 K/UL — SIGNIFICANT CHANGE UP (ref 1–3.3)
LYMPHOCYTES # BLD AUTO: 16.6 % — SIGNIFICANT CHANGE UP (ref 13–44)
MAGNESIUM SERPL-MCNC: 2.1 MG/DL — SIGNIFICANT CHANGE UP (ref 1.6–2.6)
MAGNESIUM SERPL-MCNC: 2.2 MG/DL — SIGNIFICANT CHANGE UP (ref 1.6–2.6)
MCHC RBC-ENTMCNC: 30.7 PG — SIGNIFICANT CHANGE UP (ref 27–34)
MCHC RBC-ENTMCNC: 32.9 GM/DL — SIGNIFICANT CHANGE UP (ref 32–36)
MCV RBC AUTO: 93.2 FL — SIGNIFICANT CHANGE UP (ref 80–100)
MONOCYTES # BLD AUTO: 0.66 K/UL — SIGNIFICANT CHANGE UP (ref 0–0.9)
MONOCYTES NFR BLD AUTO: 9.1 % — SIGNIFICANT CHANGE UP (ref 2–14)
NEUTROPHILS # BLD AUTO: 5.27 K/UL — SIGNIFICANT CHANGE UP (ref 1.8–7.4)
NEUTROPHILS NFR BLD AUTO: 72.8 % — SIGNIFICANT CHANGE UP (ref 43–77)
NRBC # BLD: 0 /100 WBCS — SIGNIFICANT CHANGE UP (ref 0–0)
PHOSPHATE SERPL-MCNC: 2.3 MG/DL — LOW (ref 2.5–4.5)
PLATELET # BLD AUTO: 208 K/UL — SIGNIFICANT CHANGE UP (ref 150–400)
POTASSIUM SERPL-MCNC: 3.4 MMOL/L — LOW (ref 3.5–5.3)
POTASSIUM SERPL-MCNC: 4.6 MMOL/L — SIGNIFICANT CHANGE UP (ref 3.5–5.3)
POTASSIUM SERPL-SCNC: 3.4 MMOL/L — LOW (ref 3.5–5.3)
POTASSIUM SERPL-SCNC: 4.6 MMOL/L — SIGNIFICANT CHANGE UP (ref 3.5–5.3)
PROT SERPL-MCNC: 5.3 G/DL — LOW (ref 6–8.3)
PROT SERPL-MCNC: 5.3 G/DL — LOW (ref 6–8.3)
RBC # BLD: 3.52 M/UL — LOW (ref 3.8–5.2)
RBC # FLD: 13.3 % — SIGNIFICANT CHANGE UP (ref 10.3–14.5)
RH IG SCN BLD-IMP: POSITIVE — SIGNIFICANT CHANGE UP
RHEUMATOID FACT SERPL-ACNC: 15 IU/ML — HIGH (ref 0–13)
SODIUM SERPL-SCNC: 142 MMOL/L — SIGNIFICANT CHANGE UP (ref 135–145)
WBC # BLD: 7.24 K/UL — SIGNIFICANT CHANGE UP (ref 3.8–10.5)
WBC # FLD AUTO: 7.24 K/UL — SIGNIFICANT CHANGE UP (ref 3.8–10.5)

## 2023-07-04 PROCEDURE — 71045 X-RAY EXAM CHEST 1 VIEW: CPT | Mod: 26

## 2023-07-04 PROCEDURE — 93010 ELECTROCARDIOGRAM REPORT: CPT

## 2023-07-04 PROCEDURE — 99233 SBSQ HOSP IP/OBS HIGH 50: CPT | Mod: GC

## 2023-07-04 PROCEDURE — 99231 SBSQ HOSP IP/OBS SF/LOW 25: CPT

## 2023-07-04 RX ORDER — POTASSIUM CHLORIDE 20 MEQ
40 PACKET (EA) ORAL ONCE
Refills: 0 | Status: COMPLETED | OUTPATIENT
Start: 2023-07-04 | End: 2023-07-04

## 2023-07-04 RX ORDER — DILTIAZEM HCL 120 MG
5 CAPSULE, EXT RELEASE 24 HR ORAL ONCE
Refills: 0 | Status: COMPLETED | OUTPATIENT
Start: 2023-07-04 | End: 2023-07-04

## 2023-07-04 RX ORDER — POTASSIUM PHOSPHATE, MONOBASIC POTASSIUM PHOSPHATE, DIBASIC 236; 224 MG/ML; MG/ML
15 INJECTION, SOLUTION INTRAVENOUS ONCE
Refills: 0 | Status: DISCONTINUED | OUTPATIENT
Start: 2023-07-04 | End: 2023-07-04

## 2023-07-04 RX ORDER — METOPROLOL TARTRATE 50 MG
5 TABLET ORAL ONCE
Refills: 0 | Status: DISCONTINUED | OUTPATIENT
Start: 2023-07-04 | End: 2023-07-04

## 2023-07-04 RX ORDER — AMIODARONE HYDROCHLORIDE 400 MG/1
400 TABLET ORAL ONCE
Refills: 0 | Status: COMPLETED | OUTPATIENT
Start: 2023-07-04 | End: 2023-07-04

## 2023-07-04 RX ORDER — DIGOXIN 250 MCG
250 TABLET ORAL ONCE
Refills: 0 | Status: COMPLETED | OUTPATIENT
Start: 2023-07-05 | End: 2023-07-05

## 2023-07-04 RX ORDER — METOPROLOL TARTRATE 50 MG
5 TABLET ORAL ONCE
Refills: 0 | Status: COMPLETED | OUTPATIENT
Start: 2023-07-04 | End: 2023-07-04

## 2023-07-04 RX ORDER — DIGOXIN 250 MCG
500 TABLET ORAL ONCE
Refills: 0 | Status: COMPLETED | OUTPATIENT
Start: 2023-07-04 | End: 2023-07-04

## 2023-07-04 RX ORDER — SODIUM,POTASSIUM PHOSPHATES 278-250MG
1 POWDER IN PACKET (EA) ORAL ONCE
Refills: 0 | Status: COMPLETED | OUTPATIENT
Start: 2023-07-04 | End: 2023-07-04

## 2023-07-04 RX ORDER — METOPROLOL TARTRATE 50 MG
12.5 TABLET ORAL EVERY 12 HOURS
Refills: 0 | Status: DISCONTINUED | OUTPATIENT
Start: 2023-07-04 | End: 2023-07-04

## 2023-07-04 RX ORDER — CALCIUM GLUCONATE 100 MG/ML
2 VIAL (ML) INTRAVENOUS ONCE
Refills: 0 | Status: DISCONTINUED | OUTPATIENT
Start: 2023-07-04 | End: 2023-07-04

## 2023-07-04 RX ORDER — DILTIAZEM HCL 120 MG
30 CAPSULE, EXT RELEASE 24 HR ORAL EVERY 6 HOURS
Refills: 0 | Status: DISCONTINUED | OUTPATIENT
Start: 2023-07-04 | End: 2023-07-04

## 2023-07-04 RX ADMIN — Medication 5 MILLIGRAM(S): at 15:39

## 2023-07-04 RX ADMIN — Medication 5 MILLIGRAM(S): at 15:02

## 2023-07-04 RX ADMIN — Medication 5 MILLIGRAM(S): at 22:55

## 2023-07-04 RX ADMIN — BUDESONIDE AND FORMOTEROL FUMARATE DIHYDRATE 2 PUFF(S): 160; 4.5 AEROSOL RESPIRATORY (INHALATION) at 01:55

## 2023-07-04 RX ADMIN — Medication 500 MICROGRAM(S): at 19:01

## 2023-07-04 RX ADMIN — CHLORHEXIDINE GLUCONATE 5 MILLILITER(S): 213 SOLUTION TOPICAL at 17:58

## 2023-07-04 RX ADMIN — CLOPIDOGREL BISULFATE 75 MILLIGRAM(S): 75 TABLET, FILM COATED ORAL at 12:38

## 2023-07-04 RX ADMIN — BUDESONIDE AND FORMOTEROL FUMARATE DIHYDRATE 2 PUFF(S): 160; 4.5 AEROSOL RESPIRATORY (INHALATION) at 17:59

## 2023-07-04 RX ADMIN — Medication 7.5 MILLIGRAM(S): at 06:56

## 2023-07-04 RX ADMIN — Medication 3 MILLILITER(S): at 09:38

## 2023-07-04 RX ADMIN — Medication 81 MILLIGRAM(S): at 06:57

## 2023-07-04 RX ADMIN — ATORVASTATIN CALCIUM 20 MILLIGRAM(S): 80 TABLET, FILM COATED ORAL at 21:48

## 2023-07-04 RX ADMIN — Medication 3 MILLILITER(S): at 03:27

## 2023-07-04 RX ADMIN — Medication 1 PACKET(S): at 19:01

## 2023-07-04 RX ADMIN — CHLORHEXIDINE GLUCONATE 5 MILLILITER(S): 213 SOLUTION TOPICAL at 06:57

## 2023-07-04 RX ADMIN — PANTOPRAZOLE SODIUM 40 MILLIGRAM(S): 20 TABLET, DELAYED RELEASE ORAL at 12:38

## 2023-07-04 RX ADMIN — PIPERACILLIN AND TAZOBACTAM 25 GRAM(S): 4; .5 INJECTION, POWDER, LYOPHILIZED, FOR SOLUTION INTRAVENOUS at 15:25

## 2023-07-04 RX ADMIN — Medication 40 MILLIEQUIVALENT(S): at 09:38

## 2023-07-04 RX ADMIN — Medication 40 MILLIEQUIVALENT(S): at 19:01

## 2023-07-04 RX ADMIN — QUETIAPINE FUMARATE 25 MILLIGRAM(S): 200 TABLET, FILM COATED ORAL at 17:59

## 2023-07-04 RX ADMIN — CHLORHEXIDINE GLUCONATE 1 APPLICATION(S): 213 SOLUTION TOPICAL at 06:57

## 2023-07-04 RX ADMIN — Medication 30 MILLIGRAM(S): at 15:39

## 2023-07-04 RX ADMIN — Medication 1 TABLET(S): at 19:01

## 2023-07-04 RX ADMIN — Medication 0.5 MILLIGRAM(S): at 17:59

## 2023-07-04 RX ADMIN — AMIODARONE HYDROCHLORIDE 400 MILLIGRAM(S): 400 TABLET ORAL at 23:20

## 2023-07-04 RX ADMIN — Medication 0.5 MILLIGRAM(S): at 06:57

## 2023-07-04 RX ADMIN — PIPERACILLIN AND TAZOBACTAM 25 GRAM(S): 4; .5 INJECTION, POWDER, LYOPHILIZED, FOR SOLUTION INTRAVENOUS at 06:56

## 2023-07-04 RX ADMIN — Medication 3 MILLILITER(S): at 21:48

## 2023-07-04 RX ADMIN — BUDESONIDE AND FORMOTEROL FUMARATE DIHYDRATE 2 PUFF(S): 160; 4.5 AEROSOL RESPIRATORY (INHALATION) at 09:39

## 2023-07-04 RX ADMIN — Medication 5 MILLIGRAM(S): at 22:47

## 2023-07-04 RX ADMIN — Medication 3 MILLILITER(S): at 15:25

## 2023-07-04 NOTE — PROGRESS NOTE ADULT - PROBLEM SELECTOR PLAN 7
PPX  F: none  E: replenish PRN  N: NPO  GI ppx: protonix 40mg  DVT ppx: aspirin and plavix  Code status: FULL CODE  [facility]

## 2023-07-04 NOTE — PROGRESS NOTE ADULT - ASSESSMENT
72y Female with active medical problem of rapid afib and previous history of valvular disease and watchman device placement is visited at bedside who is not in acute distress.  Patient is seen by EP service for her rapid rhythm.  Patient denies headache, syncopes, chest pain or shortness of breath.     Neurovascular:   No delirium.   Pain well controlled with current regimen.  -PRN's.    Cardiovascular:   1. tachy a-fib RVR   - EP recommendation   - rate control   2. valvular disease  - continue current management     Respiratory: 02 Sat = 98% on RA.  -If on oxygen wean to RA from for O2 Sat > 93%.  -Encourage C+DB and Use of IS 10x / hr while awake.  -CXR.    GI: Stable.  -PPX.  -PO Diet.    Renal / : stable   -Monitor renal function.  -Monitor I/O's.    Hematologic: stable   - daily lab   -CBC.  -Coagulation Panel.    ID: afebrile   -Tempature.  -CBC.  -Observe for SIRS/Sepsis Syndrome.    Prophylaxis:  -DVT prophylaxis with 5000 SubQ Heparin q8h.  -SCD's    Disposition:  - home

## 2023-07-04 NOTE — PROGRESS NOTE ADULT - SUBJECTIVE AND OBJECTIVE BOX
******TRANSFER TO   Hospital Course: Ms. Vazquez is a 73yo Cantonese-speaking F with a PMHx of HTN, HLD, CAD s/p bioprosthetic AVR and CABG (11/2018 SVG-RCA, AVR Catalan 21mm), endocarditis (on lifelong penicillin), AF (Warfarin), AAA, PVD, bronchiectasis, presented 6/28 w/ worsening cough w/ hemoptysis, now s/p ENT scope showing no bleed source. Also s/p Watchman and worsening hemoptysis and transferred to MICU. Intubated and started levophed 6/29, s/p bronch x2 showing blood-filled R lung without confirmed source of bleeding. Started zosyn given purulent secretions from bronch. Extubated and weaned off pressors 7/2. Failed dysphagia screen and S&S rec'd NPO w/ NGT pending further eval. VSS and stable for stepdown to F for further management.    Overnight events: Pulled out her NG tube. New NGT placed. Feeds readministered from 1-7pm.       Vital Signs Last 24 Hrs  T(C): 36.8 (04 Jul 2023 06:35), Max: 37.2 (03 Jul 2023 17:54)  T(F): 98.3 (04 Jul 2023 06:35), Max: 99 (03 Jul 2023 17:54)  HR: 113 (04 Jul 2023 06:35) (84 - 113)  BP: 154/85 (04 Jul 2023 06:35) (114/58 - 155/66)  BP(mean): 85 (03 Jul 2023 17:00) (80 - 97)  RR: 17 (04 Jul 2023 06:35) (17 - 41)  SpO2: 92% (04 Jul 2023 06:35) (90% - 97%)    Parameters below as of 04 Jul 2023 06:35  Patient On (Oxygen Delivery Method): room air     ******TRANSFER FROM [facility] TO [facility]******  Hospital Course: Ms. Vazquez is a 71yo Cantonese-speaking F with a PMHx of HTN, HLD, CAD s/p bioprosthetic AVR and CABG (11/2018 SVG-RCA, AVR Catalan 21mm), endocarditis (on lifelong penicillin), AF (Warfarin), AAA, PVD, bronchiectasis, presented 6/28 w/ worsening cough w/ hemoptysis, now s/p ENT scope showing no bleed source. Also s/p Watchman and worsening hemoptysis and transferred to MICU. Intubated and started levophed 6/29, s/p bronch x2 showing blood-filled R lung without confirmed source of bleeding. Started zosyn given purulent secretions from bronch. Extubated and weaned off pressors 7/2. Failed dysphagia screen and S&S rec'd NPO w/ NGT pending further eval. VSS and stable for stepdown to [facility] for further management. On 7/4 pt with HR 130s, EKG showing new A flutter with variable AV block, other VSS,. Transfer to [facility] for rate control.     Overnight events: Pulled out her NG tube. New NGT placed. Feeds readministered from 1-7pm.       Vital Signs Last 24 Hrs  T(C): 36.8 (04 Jul 2023 06:35), Max: 37.2 (03 Jul 2023 17:54)  T(F): 98.3 (04 Jul 2023 06:35), Max: 99 (03 Jul 2023 17:54)  HR: 113 (04 Jul 2023 06:35) (84 - 113)  BP: 154/85 (04 Jul 2023 06:35) (114/58 - 155/66)  BP(mean): 85 (03 Jul 2023 17:00) (80 - 97)  RR: 17 (04 Jul 2023 06:35) (17 - 41)  SpO2: 92% (04 Jul 2023 06:35) (90% - 97%)    Parameters below as of 04 Jul 2023 06:35  Patient On (Oxygen Delivery Method): room air     ******TRANSFER FROM [facility] TO [facility]******  Hospital Course: Ms. Vazquez is a 73yo Cantonese-speaking F with a PMHx of HTN, HLD, CAD s/p bioprosthetic AVR and CABG (11/2018 SVG-RCA, AVR Catalan 21mm), endocarditis (on lifelong penicillin), AF (Warfarin), AAA, PVD, bronchiectasis, presented 6/28 w/ worsening cough w/ hemoptysis, now s/p ENT scope showing no bleed source. Also s/p Watchman and worsening hemoptysis and transferred to MICU. Intubated and started levophed 6/29, s/p bronch x2 showing blood-filled R lung without confirmed source of bleeding. Started zosyn given purulent secretions from bronch. Extubated and weaned off pressors 7/2. Failed dysphagia screen and S&S rec'd NPO w/ NGT pending further eval. VSS and stable for stepdown to [facility] for further management. On 7/4 pt with HR 130s, EKG showing new A flutter with variable AV block, other VSS,. Transfer to [facility] for rate control.         VITALS  Vital Signs Last 24 Hrs  T(C): 36.8 (04 Jul 2023 06:35), Max: 37.2 (03 Jul 2023 17:54)  T(F): 98.3 (04 Jul 2023 06:35), Max: 99 (03 Jul 2023 17:54)  HR: 113 (04 Jul 2023 06:35) (84 - 113)  BP: 154/85 (04 Jul 2023 06:35) (114/58 - 155/66)  BP(mean): 85 (03 Jul 2023 17:00) (80 - 97)  RR: 17 (04 Jul 2023 06:35) (17 - 41)  SpO2: 92% (04 Jul 2023 06:35) (90% - 97%)    Parameters below as of 04 Jul 2023 06:35  Patient On (Oxygen Delivery Method): room air        CAPILLARY BLOOD GLUCOSE        PHYSICAL EXAM:  GENERAL: Well-appearing elderly female sitting upright in chair  HEAD:  Atraumatic, Normocephalic, NGT in place  EYES: PERRLA, conjunctiva and sclera clear  NECK: Supple  NERVOUS SYSTEM:  Alert & Awake.   CHEST/LUNG: B/L good air entry; rhonchi auscultated  HEART: mechanical murmur auscultated; aortic stenosis murmur 2nd R ICS  ABDOMEN: Soft,  Nondistended; mild tenderness to palpation of lower abdomen; Bowel sounds present  EXTREMITIES:  2+ pulses b/l  SKIN: No rashes or lesions    MEDICATIONS  (STANDING):  albuterol/ipratropium for Nebulization 3 milliLiter(s) Nebulizer every 6 hours  aspirin  chewable 81 milliGRAM(s) Oral every 24 hours  atorvastatin 20 milliGRAM(s) Oral at bedtime  budesonide  80 MICROgram(s)/formoterol 4.5 MICROgram(s) Inhaler 2 Puff(s) Inhalation two times a day  busPIRone 7.5 milliGRAM(s) Oral <User Schedule>  chlorhexidine 0.12% Liquid 5 milliLiter(s) Oral Mucosa two times a day  chlorhexidine 2% Cloths 1 Application(s) Topical <User Schedule>  clonazePAM  Tablet 0.5 milliGRAM(s) Oral two times a day  clopidogrel Tablet 75 milliGRAM(s) Oral daily  pantoprazole   Suspension 40 milliGRAM(s) Oral daily  piperacillin/tazobactam IVPB.. 4.5 Gram(s) IV Intermittent every 8 hours  polyethylene glycol 3350 17 Gram(s) Oral daily  QUEtiapine 25 milliGRAM(s) Oral daily    MEDICATIONS  (PRN):      Bactrim (Other)  Shrimp (Unknown)  sulfa drugs (Unknown)  Lobster (Unknown)  unknown (Ototoxicity)      LABS                        10.8   7.24  )-----------( 208      ( 04 Jul 2023 05:30 )             32.8     07-04    142  |  105  |  11  ----------------------------<  141<H>  3.4<L>   |  24  |  0.47<L>    Ca    8.3<L>      04 Jul 2023 05:30  Phos  2.3     07-04  Mg     2.1     07-04    TPro  6.2  /  Alb  2.6<L>  /  TBili  0.8  /  DBili  x   /  AST  20  /  ALT  11  /  AlkPhos  88  07-03    PTT - ( 04 Jul 2023 05:30 )  PTT:31.9 sec  Urinalysis Basic - ( 04 Jul 2023 05:30 )    Color: x / Appearance: x / SG: x / pH: x  Gluc: 141 mg/dL / Ketone: x  / Bili: x / Urobili: x   Blood: x / Protein: x / Nitrite: x   Leuk Esterase: x / RBC: x / WBC x   Sq Epi: x / Non Sq Epi: x / Bacteria: x            RADIOLOGY & ADDITIONAL TESTS: Reviewed

## 2023-07-04 NOTE — PROGRESS NOTE ADULT - SUBJECTIVE AND OBJECTIVE BOX
Patient discussed on morning rounds with Dr. Cervantes        SUBJECTIVE ASSESSMENT:  72y Female with active medical problem of rapid afib and previous history of valvular disease and watchman device placement is visited at bedside who is not in acute distress.  Patient is seen by EP service for her rapid rhythm.  Patient denies headache, syncopes, chest pain or shortness of breath.      Vital Signs Last 24 Hrs  T(C): 36.2 (04 Jul 2023 14:23), Max: 37.2 (03 Jul 2023 17:54)  T(F): 97.2 (04 Jul 2023 14:23), Max: 99 (03 Jul 2023 17:54)  HR: 130 (04 Jul 2023 11:10) (90 - 130)  BP: 112/63 (04 Jul 2023 11:10) (112/63 - 154/85)  BP(mean): 78 (04 Jul 2023 11:10) (78 - 97)  RR: 16 (04 Jul 2023 11:10) (16 - 20)  SpO2: 92% (04 Jul 2023 11:10) (90% - 93%)    Parameters below as of 04 Jul 2023 11:10  Patient On (Oxygen Delivery Method): room air      I&O's Detail    03 Jul 2023 07:01  -  04 Jul 2023 07:00  --------------------------------------------------------  IN:    Enteral Tube Flush: 100 mL    IV PiggyBack: 499.8 mL    IV PiggyBack: 100 mL  Total IN: 699.8 mL    OUT:    Indwelling Catheter - Urethral (mL): 1225 mL  Total OUT: 1225 mL    Total NET: -525.2 mL      04 Jul 2023 07:01  -  04 Jul 2023 15:22  --------------------------------------------------------  IN:    Enteral Tube Flush: 50 mL    Jevity 1.2: 120 mL  Total IN: 170 mL    OUT:    Indwelling Catheter - Urethral (mL): 100 mL  Total OUT: 100 mL    Total NET: 70 mL    CHEST TUBE: no  LOUISE DRAIN:  no  EPICARDIAL WIRES: no  TIE DOWNS: no  OBREGON: no    PHYSICAL EXAM:  CONSTITUTIONAL:  alert and awake                                                NEUROLOGICAL:  Alert and oriented x3, no gross deficits appreciated                   EYES:      PERRL           ENMT:  supple neck, no lymphadenopathy  CARDIOVASCULAR:     RRR, S1S2, no murmur  RESPIRATORY:  equal breath sounds, no rales, no rhonchi, no wheeze  GASTROINTESTINAL:  soft, nontender, positive bowel sounds  : OBREGON + / -    MUSKULOSKELETAL:  moves all extremities  SKIN / BREAST:    no rash seen  EXTREMITIES:  no edema  VASCULAR:    all pulses  intact (radial, femoral, DP/PT)      LABS:                        10.8   7.24  )-----------( 208      ( 04 Jul 2023 05:30 )             32.8       COUMADIN: no    PTT - ( 04 Jul 2023 05:30 )  PTT:31.9 sec    07-04    142  |  105  |  11  ----------------------------<  141<H>  3.4<L>   |  24  |  0.47<L>    Ca    8.3<L>      04 Jul 2023 05:30  Phos  2.3     07-04  Mg     2.1     07-04    TPro  5.3<L>  /  Alb  x   /  TBili  x   /  DBili  x   /  AST  x   /  ALT  x   /  AlkPhos  x   07-03      Urinalysis Basic - ( 04 Jul 2023 05:30 )    Color: x / Appearance: x / SG: x / pH: x  Gluc: 141 mg/dL / Ketone: x  / Bili: x / Urobili: x   Blood: x / Protein: x / Nitrite: x   Leuk Esterase: x / RBC: x / WBC x   Sq Epi: x / Non Sq Epi: x / Bacteria: x        MEDICATIONS  (STANDING):  albuterol/ipratropium for Nebulization 3 milliLiter(s) Nebulizer every 6 hours  aspirin  chewable 81 milliGRAM(s) Oral every 24 hours  atorvastatin 20 milliGRAM(s) Oral at bedtime  budesonide  80 MICROgram(s)/formoterol 4.5 MICROgram(s) Inhaler 2 Puff(s) Inhalation two times a day  busPIRone 7.5 milliGRAM(s) Oral <User Schedule>  chlorhexidine 0.12% Liquid 5 milliLiter(s) Oral Mucosa two times a day  chlorhexidine 2% Cloths 1 Application(s) Topical <User Schedule>  clonazePAM  Tablet 0.5 milliGRAM(s) Oral two times a day  clopidogrel Tablet 75 milliGRAM(s) Oral daily  diltiazem    Tablet 30 milliGRAM(s) Oral every 6 hours  metoprolol tartrate Injectable 5 milliGRAM(s) IV Push once  pantoprazole   Suspension 40 milliGRAM(s) Oral daily  piperacillin/tazobactam IVPB.. 4.5 Gram(s) IV Intermittent every 8 hours  polyethylene glycol 3350 17 Gram(s) Oral daily  QUEtiapine 25 milliGRAM(s) Oral daily    MEDICATIONS  (PRN):        RADIOLOGY & ADDITIONAL TESTS:

## 2023-07-04 NOTE — CONSULT NOTE ADULT - SUBJECTIVE AND OBJECTIVE BOX
Patient seen and evaluated at bedside    HPI:  73yo Cantonese-speaking F with a PMHx of HTN, HLD, CAD s/p bioprosthetic AVR and CABG (11/2018 SVG-RCA, AVR Catalan 21mm), endocarditis (on lifelong penicillin), AF (Warfarin), AAA, PVD, bronchiectasis, presented 6/28 w/ worsening cough w/ hemoptysis, now s/p ENT scope showing no bleeding source. Also s/p Watchman and worsening hemoptysis and transferred to MICU. Intubated and started levophed 6/29, s/p bronch x2 showing blood-filled R lung without confirmed source of bleeding. Started zosyn given purulent secretions from bronch. Extubated and weaned off pressors 7/2. Failed dysphagia screen and S&S rec'd NPO w/ NGT pending further eval. VSS and stable for stepdown to F for further management. On 7/4 pt with HR 130s, EKG showing new Aflutter with variable AV block, other VSS. Telemetry with atrial fibrillation w/ RVR.       PMHx:   Atrial fibrillation    History of bronchiectasis    CAD (coronary artery disease)    Hyperlipidemia    HTN (hypertension)    History of peripheral vascular disease    Rheumatic aortic disease    History of endocarditis    History of hemoptysis      PSHx:   Elective surgery    Elective surgery      FAMILY HISTORY:    Allergies:  Bactrim (Other)  Shrimp (Unknown)  sulfa drugs (Unknown)  Lobster (Unknown)  unknown (Ototoxicity)    Home Medications:  Albuterol (Eqv-ProAir HFA) 90 mcg/inh inhalation aerosol: 2 inhaled 4 times a day as needed for  shortness of breath and/or wheezing (28 Jun 2023 07:34)  amLODIPine 2.5 mg oral tablet: 1 orally (28 Jun 2023 07:13)  aspirin 81 mg oral tablet: 1 orally once a day (28 Jun 2023 07:13)  breo ellipta: 100-25 mcg/INH aepb (28 Jun 2023 07:13)  busPIRone 5 mg oral tablet: 1.5 tab(s) orally (28 Jun 2023 07:34)  clonazePAM 0.5 mg oral tablet: 1 orally (28 Jun 2023 07:13)  Lipitor 20 mg oral tablet: 1 orally once a day (28 Jun 2023 07:34)  pantoprazole 40 mg oral delayed release tablet: 1 orally once a day (28 Jun 2023 07:34)  Seroquel 25 mg oral tablet: 1 orally (28 Jun 2023 07:34)    Current Medications:   albuterol/ipratropium for Nebulization 3 milliLiter(s) Nebulizer every 6 hours  aspirin  chewable 81 milliGRAM(s) Oral every 24 hours  atorvastatin 20 milliGRAM(s) Oral at bedtime  budesonide  80 MICROgram(s)/formoterol 4.5 MICROgram(s) Inhaler 2 Puff(s) Inhalation two times a day  busPIRone 7.5 milliGRAM(s) Oral <User Schedule>  chlorhexidine 0.12% Liquid 5 milliLiter(s) Oral Mucosa two times a day  chlorhexidine 2% Cloths 1 Application(s) Topical <User Schedule>  clonazePAM  Tablet 0.5 milliGRAM(s) Oral two times a day  clopidogrel Tablet 75 milliGRAM(s) Oral daily  pantoprazole   Suspension 40 milliGRAM(s) Oral daily  piperacillin/tazobactam IVPB.. 4.5 Gram(s) IV Intermittent every 8 hours  polyethylene glycol 3350 17 Gram(s) Oral daily  QUEtiapine 25 milliGRAM(s) Oral daily      REVIEW OF SYSTEMS:  Constitutional:     [X] negative [ ] fevers [ ] chills [ ] weight loss [ ] weight gain  HEENT:                  [X] negative [ ] dry eyes [ ] eye irritation [ ] postnasal drip [ ] nasal congestion  CV:                         [ ] negative  [ ] chest pain [ ] orthopnea [X] palpitations [ ] murmur  Resp:                     [X] negative [ ] cough [ ] shortness of breath [ ] wheezing [ ] sputum [ ] hemoptysis  GI:                          [X] negative [ ] nausea [ ] vomiting [ ] diarrhea [ ] constipation [ ] abd pain [ ] dysphagia   :                        [X] negative [ ] dysuria [ ] nocturia [ ] hematuria [ ] increased urinary frequency  MSK:                      [X] negative [ ] back pain [ ] myalgias [ ] arthralgias [ ] fracture  Skin:                       [X] negative [ ] rash [ ] itch  Neuro:                   [X] negative [ ] headache [ ] dizziness [ ] syncope [ ] weakness [ ] numbness  Psych:                    [X] negative [ ] anxiety [ ] depression  Endo:                     [X] negative [ ] diabetes [ ] thyroid problem  Heme/Lymph:      [X] negative [ ] anemia [ ] bleeding problem  Allergic/Immune: [X] negative [ ] itchy eyes [ ] nasal discharge [ ] hives [ ] angioedema    [X] All other systems negative or otherwise described above.  [ ] Unable to assess ROS due to ________.    ICU Vital Signs Last 24 Hrs  T(C): 36.8 (04 Jul 2023 06:35), Max: 37.2 (03 Jul 2023 17:54)  T(F): 98.3 (04 Jul 2023 06:35), Max: 99 (03 Jul 2023 17:54)  HR: 130 (04 Jul 2023 11:10) (85 - 130)  BP: 112/63 (04 Jul 2023 11:10) (112/63 - 155/66)  BP(mean): 78 (04 Jul 2023 11:10) (78 - 97)  ABP: --  ABP(mean): --  RR: 16 (04 Jul 2023 11:10) (16 - 41)  SpO2: 92% (04 Jul 2023 11:10) (90% - 96%)    O2 Parameters below as of 04 Jul 2023 11:10  Patient On (Oxygen Delivery Method): room air          Orthostatic VS    Daily     Daily   I&O's Summary    03 Jul 2023 07:01  -  04 Jul 2023 07:00  --------------------------------------------------------  IN: 699.8 mL / OUT: 1225 mL / NET: -525.2 mL    04 Jul 2023 07:01  -  04 Jul 2023 13:49  --------------------------------------------------------  IN: 170 mL / OUT: 100 mL / NET: 70 mL        Physical Exam:  GENERAL: No acute distress, well-developed  HEAD:  Atraumatic, Normocephalic  ENT: EOMI, conjunctiva and sclera clear, Neck supple, No JVD, moist mucosa  CHEST/LUNG: Clear to auscultation bilaterally; No wheeze, equal breath sounds bilaterally   BACK: No spinal tenderness  HEART: Tachycardic, irregularly irregular rhythm, euvolemic  ABDOMEN: Soft, Nontender, Nondistended  EXTREMITIES:  No clubbing, cyanosis, or edema  PSYCH: Nl behavior, nl affect  NEUROLOGY: AAOx3, non-focal  SKIN: Normal color, No rashes or lesions      LABS:                        10.8   7.24  )-----------( 208      ( 04 Jul 2023 05:30 )             32.8     PTT - ( 04 Jul 2023 05:30 )  PTT:31.9 sec  07-04    142  |  105  |  11  ----------------------------<  141<H>  3.4<L>   |  24  |  0.47<L>    Ca    8.3<L>      04 Jul 2023 05:30  Phos  2.3     07-04  Mg     2.1     07-04    TPro  5.3<L>  /  Alb  x   /  TBili  x   /  DBili  x   /  AST  x   /  ALT  x   /  AlkPhos  x   07-03        Urinalysis Basic - ( 04 Jul 2023 05:30 )    Color: x / Appearance: x / SG: x / pH: x  Gluc: 141 mg/dL / Ketone: x  / Bili: x / Urobili: x   Blood: x / Protein: x / Nitrite: x   Leuk Esterase: x / RBC: x / WBC x   Sq Epi: x / Non Sq Epi: x / Bacteria: x        Culture - Blood (collected 02 Jul 2023 05:20)  Source: .Blood Blood  Preliminary Report (04 Jul 2023 07:00):    No growth at 2 days.        RADIOLOGY & ADDITIONAL STUDIES:    Cardiovascular Diagnostic Testing    ECG: Personally reviewed    Telemetry: reviewed; atrial fibrillation    Echo: Personally reviewed  < from: TTE Echo Complete w/o Contrast w/ Doppler (06.29.23 @ 09:00) >  CONCLUSIONS:     1. Technically difficult study.   2. The left ventricle is normal in size, wall thickness, and systolic   function with a calculated ejection fraction of 65-70%.   3. The apical segments are not well visualized-possible apical   hypertrophy-consider repeat imaging with echo contrast if clinically   indicated.   4. Normal right ventricular size and systolic function.   5. Normal atria.   6. Bioprosthetic valve is seen in the aortic position withnormal   function.   7. No evidence of pulmonary hypertension.   8. No pericardial effusion.      < end of copied text >      CXR: Personally reviewed  < from: Xray Chest 1 View- PORTABLE-Urgent (Xray Chest 1 View- PORTABLE-Urgent .) (07.04.23 @ 07:45) >  IMPRESSION:  Interval advancement of weighted enteric tube, sidehole overlying the   left upper quadrant. Small left pleural effusion. Congestion and/or   infiltrates.    --- End of Report ---    < end of copied text >    Other cardiac imaging:   < from: CT Heart without Coronaries w/ IV Cont (05.17.23 @ 15:09) >  IMPRESSION:    Cardiac:      1.  Please note this study was not optimized for the evaluation of the   coronary arteries.  2.  SVG-mid RCA origin patent, body probably patent, anastomotic site   patent.  3.  Severe stenosis in the proximal RCA secondary to noncalcific plaque.   Nonobstructive coronary artery disease distal to SVG touchdown site.  4.  Proximal LAD, RPDA, and RPL not well visualized.  5.  Nonobstructive coronary artery disease in remaining segments.  6.  S/p bioprosthetic aortic valve with normal leaflet excursion.  7.  No NOEMÍ thrombus.  8.  NOEMÍ ostium is 30.1 mm x 34.2 mm. Length is 46.9 mm.      Non-cardiac:  IMPRESSION: 4.2 cm ascending aortic aneurysm. Lung findings suggestive of   BRENNA pneumonia.    --- End of Report ---    < end of copied text >      Other misc imaging:   < from: CT Angio Chest Aorta w/wo IV Cont (06.29.23 @ 23:06) >  IMPRESSION:  1.   No obvious vascular abnormality seen to explain hemoptysis.  2.   Patchy infiltrate in posterior right upper lobe, with reticulonodular  infiltrate in lateral right upper lobe.  3.   Scattered areas of tree-in-bud bronchiolitic changes in both lungs.  4.   Right middle lobe and lingular atelectasis with bronchiectasis.  5.   A 5 mm pulmonary nodule in the left upper lobe. For patients at low   risk  (minimal or absent history of smoking and of other known risk factors), no  routine follow-up is indicated. For patients at high risk (history of   smoking  or of other known risk factors), consider optional CT Chest at 12 months.  (Reference: Teri)  6. Ectasia of the ascending aorta.. No aortic dissection      < end of copied text >   Patient seen and evaluated at bedside    HPI:  73yo Cantonese-speaking F with a PMHx of HTN, HLD, CAD s/p bioprosthetic AVR and CABG (11/2018 SVG-RCA, AVR Catalan 21mm), endocarditis (on lifelong penicillin), AF (Warfarin), AAA, PVD, bronchiectasis, presented 6/28 w/ worsening cough w/ hemoptysis, now s/p ENT scope showing no bleeding source. Also s/p Watchman and worsening hemoptysis and transferred to MICU. Intubated and started levophed 6/29, s/p bronch x2 showing blood-filled R lung without confirmed source of bleeding. Started zosyn given purulent secretions from bronch. Extubated and weaned off pressors 7/2. Failed dysphagia screen and S&S rec'd NPO w/ NGT pending further eval. VSS and stable for stepdown to [facility] for further management. On 7/4 pt with HR 130s, other VSS. Telemetry suggestive of atrial fibrillation w/ RVR.       PMHx:   Atrial fibrillation    History of bronchiectasis    CAD (coronary artery disease)    Hyperlipidemia    HTN (hypertension)    History of peripheral vascular disease    Rheumatic aortic disease    History of endocarditis    History of hemoptysis      PSHx:   Elective surgery    Elective surgery      FAMILY HISTORY:    Allergies:  Bactrim (Other)  Shrimp (Unknown)  sulfa drugs (Unknown)  Lobster (Unknown)  unknown (Ototoxicity)    Home Medications:  Albuterol (Eqv-ProAir HFA) 90 mcg/inh inhalation aerosol: 2 inhaled 4 times a day as needed for  shortness of breath and/or wheezing (28 Jun 2023 07:34)  amLODIPine 2.5 mg oral tablet: 1 orally (28 Jun 2023 07:13)  aspirin 81 mg oral tablet: 1 orally once a day (28 Jun 2023 07:13)  breo ellipta: 100-25 mcg/INH aepb (28 Jun 2023 07:13)  busPIRone 5 mg oral tablet: 1.5 tab(s) orally (28 Jun 2023 07:34)  clonazePAM 0.5 mg oral tablet: 1 orally (28 Jun 2023 07:13)  Lipitor 20 mg oral tablet: 1 orally once a day (28 Jun 2023 07:34)  pantoprazole 40 mg oral delayed release tablet: 1 orally once a day (28 Jun 2023 07:34)  Seroquel 25 mg oral tablet: 1 orally (28 Jun 2023 07:34)    Current Medications:   albuterol/ipratropium for Nebulization 3 milliLiter(s) Nebulizer every 6 hours  aspirin  chewable 81 milliGRAM(s) Oral every 24 hours  atorvastatin 20 milliGRAM(s) Oral at bedtime  budesonide  80 MICROgram(s)/formoterol 4.5 MICROgram(s) Inhaler 2 Puff(s) Inhalation two times a day  busPIRone 7.5 milliGRAM(s) Oral <User Schedule>  chlorhexidine 0.12% Liquid 5 milliLiter(s) Oral Mucosa two times a day  chlorhexidine 2% Cloths 1 Application(s) Topical <User Schedule>  clonazePAM  Tablet 0.5 milliGRAM(s) Oral two times a day  clopidogrel Tablet 75 milliGRAM(s) Oral daily  pantoprazole   Suspension 40 milliGRAM(s) Oral daily  piperacillin/tazobactam IVPB.. 4.5 Gram(s) IV Intermittent every 8 hours  polyethylene glycol 3350 17 Gram(s) Oral daily  QUEtiapine 25 milliGRAM(s) Oral daily      REVIEW OF SYSTEMS:  Constitutional:     [X] negative [ ] fevers [ ] chills [ ] weight loss [ ] weight gain  HEENT:                  [X] negative [ ] dry eyes [ ] eye irritation [ ] postnasal drip [ ] nasal congestion  CV:                         [ ] negative  [ ] chest pain [ ] orthopnea [X] palpitations [ ] murmur  Resp:                     [X] negative [ ] cough [ ] shortness of breath [ ] wheezing [ ] sputum [ ] hemoptysis  GI:                          [X] negative [ ] nausea [ ] vomiting [ ] diarrhea [ ] constipation [ ] abd pain [ ] dysphagia   :                        [X] negative [ ] dysuria [ ] nocturia [ ] hematuria [ ] increased urinary frequency  MSK:                      [X] negative [ ] back pain [ ] myalgias [ ] arthralgias [ ] fracture  Skin:                       [X] negative [ ] rash [ ] itch  Neuro:                   [X] negative [ ] headache [ ] dizziness [ ] syncope [ ] weakness [ ] numbness  Psych:                    [X] negative [ ] anxiety [ ] depression  Endo:                     [X] negative [ ] diabetes [ ] thyroid problem  Heme/Lymph:      [X] negative [ ] anemia [ ] bleeding problem  Allergic/Immune: [X] negative [ ] itchy eyes [ ] nasal discharge [ ] hives [ ] angioedema    [X] All other systems negative or otherwise described above.  [ ] Unable to assess ROS due to ________.    ICU Vital Signs Last 24 Hrs  T(C): 36.8 (04 Jul 2023 06:35), Max: 37.2 (03 Jul 2023 17:54)  T(F): 98.3 (04 Jul 2023 06:35), Max: 99 (03 Jul 2023 17:54)  HR: 130 (04 Jul 2023 11:10) (85 - 130)  BP: 112/63 (04 Jul 2023 11:10) (112/63 - 155/66)  BP(mean): 78 (04 Jul 2023 11:10) (78 - 97)  ABP: --  ABP(mean): --  RR: 16 (04 Jul 2023 11:10) (16 - 41)  SpO2: 92% (04 Jul 2023 11:10) (90% - 96%)    O2 Parameters below as of 04 Jul 2023 11:10  Patient On (Oxygen Delivery Method): room air          Orthostatic VS    Daily     Daily   I&O's Summary    03 Jul 2023 07:01  -  04 Jul 2023 07:00  --------------------------------------------------------  IN: 699.8 mL / OUT: 1225 mL / NET: -525.2 mL    04 Jul 2023 07:01  -  04 Jul 2023 13:49  --------------------------------------------------------  IN: 170 mL / OUT: 100 mL / NET: 70 mL        Physical Exam:  GENERAL: No acute distress, well-developed  HEAD:  Atraumatic, Normocephalic  ENT: EOMI, conjunctiva and sclera clear, Neck supple, No JVD, moist mucosa  CHEST/LUNG: Clear to auscultation bilaterally; No wheeze, equal breath sounds bilaterally   BACK: No spinal tenderness  HEART: Tachycardic, irregularly irregular rhythm, euvolemic  ABDOMEN: Soft, Nontender, Nondistended  EXTREMITIES:  No clubbing, cyanosis, or edema  PSYCH: Nl behavior, nl affect  NEUROLOGY: AAOx3, non-focal  SKIN: Normal color, No rashes or lesions      LABS:                        10.8   7.24  )-----------( 208      ( 04 Jul 2023 05:30 )             32.8     PTT - ( 04 Jul 2023 05:30 )  PTT:31.9 sec  07-04    142  |  105  |  11  ----------------------------<  141<H>  3.4<L>   |  24  |  0.47<L>    Ca    8.3<L>      04 Jul 2023 05:30  Phos  2.3     07-04  Mg     2.1     07-04    TPro  5.3<L>  /  Alb  x   /  TBili  x   /  DBili  x   /  AST  x   /  ALT  x   /  AlkPhos  x   07-03        Urinalysis Basic - ( 04 Jul 2023 05:30 )    Color: x / Appearance: x / SG: x / pH: x  Gluc: 141 mg/dL / Ketone: x  / Bili: x / Urobili: x   Blood: x / Protein: x / Nitrite: x   Leuk Esterase: x / RBC: x / WBC x   Sq Epi: x / Non Sq Epi: x / Bacteria: x        Culture - Blood (collected 02 Jul 2023 05:20)  Source: .Blood Blood  Preliminary Report (04 Jul 2023 07:00):    No growth at 2 days.        RADIOLOGY & ADDITIONAL STUDIES:    Cardiovascular Diagnostic Testing    ECG: Personally reviewed    Telemetry: reviewed; atrial fibrillation    Echo: Personally reviewed  < from: TTE Echo Complete w/o Contrast w/ Doppler (06.29.23 @ 09:00) >  CONCLUSIONS:     1. Technically difficult study.   2. The left ventricle is normal in size, wall thickness, and systolic   function with a calculated ejection fraction of 65-70%.   3. The apical segments are not well visualized-possible apical   hypertrophy-consider repeat imaging with echo contrast if clinically   indicated.   4. Normal right ventricular size and systolic function.   5. Normal atria.   6. Bioprosthetic valve is seen in the aortic position withnormal   function.   7. No evidence of pulmonary hypertension.   8. No pericardial effusion.      < end of copied text >      CXR: Personally reviewed  < from: Xray Chest 1 View- PORTABLE-Urgent (Xray Chest 1 View- PORTABLE-Urgent .) (07.04.23 @ 07:45) >  IMPRESSION:  Interval advancement of weighted enteric tube, sidehole overlying the   left upper quadrant. Small left pleural effusion. Congestion and/or   infiltrates.    --- End of Report ---    < end of copied text >    Other cardiac imaging:   < from: CT Heart without Coronaries w/ IV Cont (05.17.23 @ 15:09) >  IMPRESSION:    Cardiac:      1.  Please note this study was not optimized for the evaluation of the   coronary arteries.  2.  SVG-mid RCA origin patent, body probably patent, anastomotic site   patent.  3.  Severe stenosis in the proximal RCA secondary to noncalcific plaque.   Nonobstructive coronary artery disease distal to SVG touchdown site.  4.  Proximal LAD, RPDA, and RPL not well visualized.  5.  Nonobstructive coronary artery disease in remaining segments.  6.  S/p bioprosthetic aortic valve with normal leaflet excursion.  7.  No NOEMÍ thrombus.  8.  NOEMÍ ostium is 30.1 mm x 34.2 mm. Length is 46.9 mm.      Non-cardiac:  IMPRESSION: 4.2 cm ascending aortic aneurysm. Lung findings suggestive of   BRENNA pneumonia.    --- End of Report ---    < end of copied text >      Other misc imaging:   < from: CT Angio Chest Aorta w/wo IV Cont (06.29.23 @ 23:06) >  IMPRESSION:  1.   No obvious vascular abnormality seen to explain hemoptysis.  2.   Patchy infiltrate in posterior right upper lobe, with reticulonodular  infiltrate in lateral right upper lobe.  3.   Scattered areas of tree-in-bud bronchiolitic changes in both lungs.  4.   Right middle lobe and lingular atelectasis with bronchiectasis.  5.   A 5 mm pulmonary nodule in the left upper lobe. For patients at low   risk  (minimal or absent history of smoking and of other known risk factors), no  routine follow-up is indicated. For patients at high risk (history of   smoking  or of other known risk factors), consider optional CT Chest at 12 months.  (Reference: Teri)  6. Ectasia of the ascending aorta.. No aortic dissection      < end of copied text >

## 2023-07-04 NOTE — PROGRESS NOTE ADULT - PROBLEM SELECTOR PLAN 4
#S/p AVR  #S/p Watchman  Known h/o AF, takes Warfarin at home. Not on any rate or rhythm-control agents at home. S/p watchman placement 6/28 w/ CTS.    - currently in NSR  - per CTS on ASA 81mg qd and Plavix 75mg qd while inpatient via NGT
S/p CABG. Known, takes ASA 81mg qd and Atorvastatin 20mg qhs at home.  - per CTS on ASA 81mg qd and Plavix 75mg qd while inpatient  - NGT today and continue ASA + Plavix  - Atorvastatin once cleared by S&S

## 2023-07-04 NOTE — PROGRESS NOTE ADULT - PROBLEM SELECTOR PLAN 3
Known, uses Albuterol 90mcg/inh 2 puffs q4h PRN and Breo Ellipta 100/25 mcg/INH at home. Bleeding resolved on repeat bronchoscopy  - c/w zosyn for bronchiectasis exacerbation  - f/u bronchiectasis labs per pulm: alpha 1 antitrypsin, ANCAs, aspergillus ab, CCP, CF expanded panel, IgE total, immunoglobulins, protein electrophoresis, RF, and Sjogrens  - cultures showed no growth

## 2023-07-04 NOTE — PROGRESS NOTE ADULT - PROBLEM SELECTOR PLAN 6
Takes Buspirone 7.5mg qd, Seroquel 25mg qd, and clonazepam 0.5mg BID at home.    - c/w psych meds inpatient

## 2023-07-04 NOTE — PROGRESS NOTE ADULT - ASSESSMENT
73yo Cantonese-speaking F PMH HTN, HLD, CAD s/p bioAVR/CABG (11/2018 SVG-RCA, Catalan Inspiris 21mm), endocarditis (lifelong penicillin), AF on warfarin, AAA, PVD, bronchiectasis, presented 6/28 w/ worsening cough w/ hemoptysis, now s/p ENT scope showing no bleed source. Initial bronch showed blood filled R lung. s/p Watchman 6/28, transferred to MICU 6/29 due to worsening hemoptysis and for intubation. Patient is now extubated with improving prognosis.

## 2023-07-04 NOTE — PROGRESS NOTE ADULT - PROBLEM SELECTOR PLAN 2
Presented w/ complaints of hemoptysis, s/p scope w/ ENT showing no active bleeding site. Per pt has worsened over past 1-2 days in the hospital. Now s/p intubation and bronchoscopy 6/29 showing blood in R lung concerning for bleeding bronchial artery in RLL.  No active bleed on CTA chest or repeat bronch; Hb stable, no IR intervention at this time. s/p txa. extubated.   - limit suctioning given risk of worsening bleeding  - c/w zosyn (started 06/30/23) to treat pseudomonas per pulm  - follow pulm recs

## 2023-07-04 NOTE — PROGRESS NOTE ADULT - PROBLEM SELECTOR PLAN 1
#S/p AVR  #S/p Watchman  #New A flutter   Known h/o AF, takes Warfarin at home. Not on any rate or rhythm-control agents at home. S/p watchman placement 6/28 w/ CTS.  - on 7/4 HR 130s with new A flutter with variable AV block, transfer to telemetry for rate control / further monitoring  - on ASA 81mg qd and Plavix 75mg qd while inpatient via NGT

## 2023-07-04 NOTE — PROGRESS NOTE ADULT - PROBLEM SELECTOR PLAN 5
Known chronic endocarditis, on life long Penicillin     - s/p Cefazolin 2g q8h x5 doses (from OR after Watchman)  - c/w Zosyn for antipseudomonal coverage, also sufficient for tx of chronic endocarditis  - resume penicillin s/p course of zosyn

## 2023-07-04 NOTE — CONSULT NOTE ADULT - ASSESSMENT
71yo Cantonese-speaking F with a PMHx of HTN, HLD, CAD s/p bioprosthetic AVR and CABG (11/2018 SVG-RCA, AVR Catalan 21mm), endocarditis (on lifelong penicillin), AF (Warfarin), AAA, PVD, bronchiectasis, presented w/ hemoptysis of unclear etiology, currently on abx with course complicated by episode of rapid afib/aflutter     Plan 71yo Cantonese-speaking F with a PMHx of HTN, HLD, CAD s/p bioprosthetic AVR and CABG (11/2018 SVG-RCA, AVR Catalan 21mm), endocarditis (on lifelong penicillin), AF (Warfarin), AAA, PVD, bronchiectasis, presented w/ hemoptysis of unclear etiology, currently on abx with course complicated by episode of rapid afib/aflutter     Plan  -recommend PO diltiazem 30mg q6h   -s/p Watchman  -will c/w telemetry monitoring

## 2023-07-04 NOTE — PROGRESS NOTE ADULT - ATTENDING COMMENTS
73yo Cantonese-speaking F PMH HTN, HLD, CAD s/p bioAVR/CABG (11/2018 SVG-RCA, Catalan Inspiris 21mm), endocarditis, AF on warfarin, AAA, PVD, COPD/ bronchiectasis ( on Breo Ellipita and Proair) ? hx MAC, with worsening cough and hemoptysis on warfarin, s/p watchman device placement ( 6/28), postop Day 1 (6/29)c/b worsening cough with hemoptysis, ENT scope showing no bleeding source, and CTA Chest did not reveal source of bleed, brought to MICU ( 6/29) intubated/paralyzed ( 6/29) for bronch with findings of blood-filled R lung, IR unable to embolize. Pt was given Tranexamic acid nebs, repeat Bronch ( 7/1) showed no active bleeding seen, frail tissues/inflammation, BAL with Pseudomonas and started on Zosyn ( 6/20 @ 2200), clinically improving and extubated on ( 7/2 am). Pt failed SLP eval ( 7/3), keep NPO/NGT feed. Pt deemed medically stable and transferred to [address] ( 7/3), failed TOV with smith cath placement ( 7/3).     # COPD  # Bronchiectasis exacerbation ( Pseudomonas)   # Massive hemoptysis   # Acute hypoxic respiratory failure   # Dysphagia  # CAD s/p CABG   # BioAVR   # Hx chronic Endocarditis ( on lifelong PCN)   # AFib s/p Watchman device ( 6/28)   # HTN  # HLD  # Depression/Anxiety  # New onset Atrial Flutter w. variable blocks( 7/4)     - Pt was transferred out of MICU to [address] (7/3)  - Transfer to [facility] (7/4) under structural heart specialist, Dr. Moose Johnson as discussed for the new onset Atrial Flutter for sanford agent and further management   - sanfrod agent/rate control per Dr. Cervantes  - Pulm f/u appreciated   - Bronchiectasis workup per Pulm: alpha 1 antitrypsin, ANCAs, aspergillus ab, CCP, CF expanded panel, IgE total, immunoglobulins, protein electrophoresis, RF, and Sjogrens  - collaterals from outside Pulm Dr. Efren Wise re: hx MAC? per patient   -s/p Cefazolin 2g q8h x5 doses (from OR after Watchman)  - c/w anti-pseudomonal coverage: piperacillin/tazobactam IVPB.. 4.5 Gram(s) IV Intermittent every 8 hours ( 6/30 @ 2200) Day 4, to d/w Pulm re: duration of iv abx and possible transition to oral abx coverage ( patient apparently not on lifetime PCN, will clarify with primary Cardiologist Dr. Mitul Gonzalez)   - WBC 7.24K (7/4) WNL   - BCx: NGTD  - transition VM to NC( 7/3), and SpO2 91-92% RA (7/4), off O2   - ICS/LABA: budesonide  80 MICROgram(s)/formoterol 4.5 MICROgram(s) Inhaler 2 Puff(s) Inhalation two times a day  ( uses Albuterol 90mcg/inh 2 puffs q4h PRN and Breo Ellipta 100/25 mcg/INH at home)   - STEWART/ROSSY: albuterol/ipratropium for Nebulization 3 milliLiter(s) Nebulizer every 6 hours ( Proair prn at home)   - consider mucolytic: chest PT, suction as needed   - s/p Extubation (7/2)  - SLP eval appreciated( 7/3): keep NPO for dysphagia, strict oral hygiene, SLP f/u ( 7/4)   - c/w NGT feed w. Jevity  - DAPT: c/w ASA 81mg and Clopidogrel 75mg po daily for at least 45 days and will need repeat CT as d/w    - HLD: Atorvastatin 20mg po qHS ( held while NPO)   - hx Anxiety:        clonazePAM  Tablet 0.5 milliGRAM(s) Oral two times a day       busPIRone 7.5 milliGRAM(s) Oral q HS <User Schedule>       QUEtiapine 25 milliGRAM(s) Oral daily   - Smith d/c'd ( 7/3) but failed TOV with smith cath replacement ( 7/3)     N: NPO/NGT feed  GI ppx: protonix 40mg  DVT ppx: holding pharmacologic ppx for now given hemoptysis; SCDs  Code status: FULL CODE    Dispo: medically active, transfer to [facility] under structural heart      PMD: Dr.Helang Philippe Marvin ( [facility]) [phone]  Pulm; Dr. Efren Wise [phone]  [facility]: Dr. Mitul Gonzalez/ Dr. Nubia Minor [phone]  [facility] [employer] [phone] ( [address] [place] [address])   Family contact: Joseph Vazquez (son) [phone]      POC as d/w [facility] resident Dr. Hawa Rae and Cardiologists Dr.David Gonzalez, Dr.Apurva Minor and Dr. Solitario Cervantes   Son Joseph Vazquez was informed about status and transfer to [facility].

## 2023-07-05 LAB
% ALBUMIN: 43.8 % — SIGNIFICANT CHANGE UP
% ALPHA 1: 11 % — SIGNIFICANT CHANGE UP
% ALPHA 2: 15.2 % — SIGNIFICANT CHANGE UP
% BETA: 14.2 % — SIGNIFICANT CHANGE UP
% GAMMA: 15.8 % — SIGNIFICANT CHANGE UP
ALBUMIN SERPL ELPH-MCNC: 2.3 G/DL — LOW (ref 3.6–5.5)
ALBUMIN SERPL ELPH-MCNC: 3.1 G/DL — LOW (ref 3.3–5)
ALBUMIN/GLOB SERPL ELPH: 0.8 RATIO — SIGNIFICANT CHANGE UP
ALP SERPL-CCNC: 91 U/L — SIGNIFICANT CHANGE UP (ref 40–120)
ALPHA1 GLOB SERPL ELPH-MCNC: 0.6 G/DL — HIGH (ref 0.1–0.4)
ALPHA2 GLOB SERPL ELPH-MCNC: 0.8 G/DL — SIGNIFICANT CHANGE UP (ref 0.5–1)
ALT FLD-CCNC: 16 U/L — SIGNIFICANT CHANGE UP (ref 10–45)
ANION GAP SERPL CALC-SCNC: 12 MMOL/L — SIGNIFICANT CHANGE UP (ref 5–17)
ANISOCYTOSIS BLD QL: SLIGHT — SIGNIFICANT CHANGE UP
AST SERPL-CCNC: 26 U/L — SIGNIFICANT CHANGE UP (ref 10–40)
AUTO DIFF PNL BLD: NEGATIVE — SIGNIFICANT CHANGE UP
B-GLOBULIN SERPL ELPH-MCNC: 0.8 G/DL — SIGNIFICANT CHANGE UP (ref 0.5–1)
BASOPHILS # BLD AUTO: 0.17 K/UL — SIGNIFICANT CHANGE UP (ref 0–0.2)
BASOPHILS NFR BLD AUTO: 1.8 % — SIGNIFICANT CHANGE UP (ref 0–2)
BILIRUB SERPL-MCNC: 0.5 MG/DL — SIGNIFICANT CHANGE UP (ref 0.2–1.2)
BUN SERPL-MCNC: 16 MG/DL — SIGNIFICANT CHANGE UP (ref 7–23)
C-ANCA SER-ACNC: NEGATIVE — SIGNIFICANT CHANGE UP
C3 SERPL-MCNC: 122 MG/DL — SIGNIFICANT CHANGE UP (ref 81–157)
C4 SERPL-MCNC: 18 MG/DL — SIGNIFICANT CHANGE UP (ref 13–39)
CALCIUM SERPL-MCNC: 8.5 MG/DL — SIGNIFICANT CHANGE UP (ref 8.4–10.5)
CCP IGG SERPL-ACNC: <8 UNITS — SIGNIFICANT CHANGE UP
CHLORIDE SERPL-SCNC: 104 MMOL/L — SIGNIFICANT CHANGE UP (ref 96–108)
CO2 SERPL-SCNC: 24 MMOL/L — SIGNIFICANT CHANGE UP (ref 22–31)
CREAT SERPL-MCNC: 0.5 MG/DL — SIGNIFICANT CHANGE UP (ref 0.5–1.3)
CULTURE RESULTS: SIGNIFICANT CHANGE UP
DACRYOCYTES BLD QL SMEAR: SLIGHT — SIGNIFICANT CHANGE UP
DSDNA AB SER-ACNC: <12 IU/ML — SIGNIFICANT CHANGE UP
EGFR: 98 ML/MIN/1.73M2 — SIGNIFICANT CHANGE UP
EOSINOPHIL # BLD AUTO: 0.08 K/UL — SIGNIFICANT CHANGE UP (ref 0–0.5)
EOSINOPHIL NFR BLD AUTO: 0.9 % — SIGNIFICANT CHANGE UP (ref 0–6)
GAMMA GLOBULIN: 0.8 G/DL — SIGNIFICANT CHANGE UP (ref 0.6–1.6)
GIANT PLATELETS BLD QL SMEAR: PRESENT — SIGNIFICANT CHANGE UP
GLUCOSE BLDC GLUCOMTR-MCNC: 138 MG/DL — HIGH (ref 70–99)
GLUCOSE SERPL-MCNC: 164 MG/DL — HIGH (ref 70–99)
HCT VFR BLD CALC: 33.9 % — LOW (ref 34.5–45)
HGB BLD-MCNC: 10.5 G/DL — LOW (ref 11.5–15.5)
HYPOCHROMIA BLD QL: SLIGHT — SIGNIFICANT CHANGE UP
IGA FLD-MCNC: 372 MG/DL — SIGNIFICANT CHANGE UP (ref 84–499)
IGG FLD-MCNC: 912 MG/DL — SIGNIFICANT CHANGE UP (ref 610–1660)
IGM SERPL-MCNC: 54 MG/DL — SIGNIFICANT CHANGE UP (ref 35–242)
INTERPRETATION SERPL IFE-IMP: SIGNIFICANT CHANGE UP
KAPPA LC SER QL IFE: 3 MG/DL — HIGH (ref 0.33–1.94)
KAPPA/LAMBDA FREE LIGHT CHAIN RATIO, SERUM: 1.18 RATIO — SIGNIFICANT CHANGE UP (ref 0.26–1.65)
LAMBDA LC SER QL IFE: 2.54 MG/DL — SIGNIFICANT CHANGE UP (ref 0.57–2.63)
LYMPHOCYTES # BLD AUTO: 2 K/UL — SIGNIFICANT CHANGE UP (ref 1–3.3)
LYMPHOCYTES # BLD AUTO: 21.2 % — SIGNIFICANT CHANGE UP (ref 13–44)
MACROCYTES BLD QL: SLIGHT — SIGNIFICANT CHANGE UP
MAGNESIUM SERPL-MCNC: 2.1 MG/DL — SIGNIFICANT CHANGE UP (ref 1.6–2.6)
MANUAL SMEAR VERIFICATION: SIGNIFICANT CHANGE UP
MCHC RBC-ENTMCNC: 29.9 PG — SIGNIFICANT CHANGE UP (ref 27–34)
MCHC RBC-ENTMCNC: 31 GM/DL — LOW (ref 32–36)
MCV RBC AUTO: 96.6 FL — SIGNIFICANT CHANGE UP (ref 80–100)
METAMYELOCYTES # FLD: 0.9 % — HIGH (ref 0–0)
MICROCYTES BLD QL: SLIGHT — SIGNIFICANT CHANGE UP
MONOCYTES # BLD AUTO: 0.83 K/UL — SIGNIFICANT CHANGE UP (ref 0–0.9)
MONOCYTES NFR BLD AUTO: 8.8 % — SIGNIFICANT CHANGE UP (ref 2–14)
MPO AB + PR3 PNL SER: SIGNIFICANT CHANGE UP
NEUTROPHILS # BLD AUTO: 6.17 K/UL — SIGNIFICANT CHANGE UP (ref 1.8–7.4)
NEUTROPHILS NFR BLD AUTO: 65.5 % — SIGNIFICANT CHANGE UP (ref 43–77)
OVALOCYTES BLD QL SMEAR: SLIGHT — SIGNIFICANT CHANGE UP
P-ANCA SER-ACNC: NEGATIVE — SIGNIFICANT CHANGE UP
PHOSPHATE SERPL-MCNC: 3 MG/DL — SIGNIFICANT CHANGE UP (ref 2.5–4.5)
PLAT MORPH BLD: ABNORMAL
PLATELET # BLD AUTO: 215 K/UL — SIGNIFICANT CHANGE UP (ref 150–400)
POIKILOCYTOSIS BLD QL AUTO: SIGNIFICANT CHANGE UP
POLYCHROMASIA BLD QL SMEAR: SLIGHT — SIGNIFICANT CHANGE UP
POTASSIUM SERPL-MCNC: 4.1 MMOL/L — SIGNIFICANT CHANGE UP (ref 3.5–5.3)
POTASSIUM SERPL-SCNC: 4.1 MMOL/L — SIGNIFICANT CHANGE UP (ref 3.5–5.3)
PROT PATTERN SERPL ELPH-IMP: SIGNIFICANT CHANGE UP
PROT SERPL-MCNC: 7 G/DL — SIGNIFICANT CHANGE UP (ref 6–8.3)
RBC # BLD: 3.51 M/UL — LOW (ref 3.8–5.2)
RBC # FLD: 13.6 % — SIGNIFICANT CHANGE UP (ref 10.3–14.5)
RBC BLD AUTO: ABNORMAL
RF+CCP IGG SER-IMP: NEGATIVE — SIGNIFICANT CHANGE UP
SODIUM SERPL-SCNC: 140 MMOL/L — SIGNIFICANT CHANGE UP (ref 135–145)
SPECIMEN SOURCE: SIGNIFICANT CHANGE UP
SPHEROCYTES BLD QL SMEAR: SLIGHT — SIGNIFICANT CHANGE UP
VARIANT LYMPHS # BLD: 0.9 % — SIGNIFICANT CHANGE UP (ref 0–6)
WBC # BLD: 9.42 K/UL — SIGNIFICANT CHANGE UP (ref 3.8–10.5)
WBC # FLD AUTO: 9.42 K/UL — SIGNIFICANT CHANGE UP (ref 3.8–10.5)

## 2023-07-05 PROCEDURE — 99233 SBSQ HOSP IP/OBS HIGH 50: CPT

## 2023-07-05 PROCEDURE — 99232 SBSQ HOSP IP/OBS MODERATE 35: CPT

## 2023-07-05 PROCEDURE — 99233 SBSQ HOSP IP/OBS HIGH 50: CPT | Mod: GC

## 2023-07-05 RX ORDER — ALBUMIN HUMAN 25 %
250 VIAL (ML) INTRAVENOUS ONCE
Refills: 0 | Status: COMPLETED | OUTPATIENT
Start: 2023-07-05 | End: 2023-07-05

## 2023-07-05 RX ORDER — ACETAMINOPHEN 500 MG
650 TABLET ORAL EVERY 6 HOURS
Refills: 0 | Status: DISCONTINUED | OUTPATIENT
Start: 2023-07-05 | End: 2023-07-12

## 2023-07-05 RX ORDER — AMIODARONE HYDROCHLORIDE 400 MG/1
150 TABLET ORAL ONCE
Refills: 0 | Status: COMPLETED | OUTPATIENT
Start: 2023-07-05 | End: 2023-07-05

## 2023-07-05 RX ORDER — ONDANSETRON 8 MG/1
4 TABLET, FILM COATED ORAL ONCE
Refills: 0 | Status: COMPLETED | OUTPATIENT
Start: 2023-07-05 | End: 2023-07-05

## 2023-07-05 RX ORDER — ACETAMINOPHEN 500 MG
1000 TABLET ORAL ONCE
Refills: 0 | Status: COMPLETED | OUTPATIENT
Start: 2023-07-05 | End: 2023-07-05

## 2023-07-05 RX ORDER — DIGOXIN 250 MCG
250 TABLET ORAL DAILY
Refills: 0 | Status: DISCONTINUED | OUTPATIENT
Start: 2023-07-06 | End: 2023-07-07

## 2023-07-05 RX ORDER — DILTIAZEM HCL 120 MG
30 CAPSULE, EXT RELEASE 24 HR ORAL EVERY 6 HOURS
Refills: 0 | Status: DISCONTINUED | OUTPATIENT
Start: 2023-07-05 | End: 2023-07-07

## 2023-07-05 RX ADMIN — Medication 30 MILLIGRAM(S): at 22:26

## 2023-07-05 RX ADMIN — PANTOPRAZOLE SODIUM 40 MILLIGRAM(S): 20 TABLET, DELAYED RELEASE ORAL at 11:41

## 2023-07-05 RX ADMIN — PIPERACILLIN AND TAZOBACTAM 25 GRAM(S): 4; .5 INJECTION, POWDER, LYOPHILIZED, FOR SOLUTION INTRAVENOUS at 06:26

## 2023-07-05 RX ADMIN — Medication 250 MICROGRAM(S): at 00:23

## 2023-07-05 RX ADMIN — CHLORHEXIDINE GLUCONATE 5 MILLILITER(S): 213 SOLUTION TOPICAL at 17:07

## 2023-07-05 RX ADMIN — Medication 3 MILLILITER(S): at 10:59

## 2023-07-05 RX ADMIN — Medication 0.5 MILLIGRAM(S): at 17:07

## 2023-07-05 RX ADMIN — Medication 650 MILLIGRAM(S): at 14:05

## 2023-07-05 RX ADMIN — CLOPIDOGREL BISULFATE 75 MILLIGRAM(S): 75 TABLET, FILM COATED ORAL at 11:41

## 2023-07-05 RX ADMIN — ONDANSETRON 4 MILLIGRAM(S): 8 TABLET, FILM COATED ORAL at 17:52

## 2023-07-05 RX ADMIN — QUETIAPINE FUMARATE 25 MILLIGRAM(S): 200 TABLET, FILM COATED ORAL at 17:07

## 2023-07-05 RX ADMIN — CHLORHEXIDINE GLUCONATE 1 APPLICATION(S): 213 SOLUTION TOPICAL at 06:38

## 2023-07-05 RX ADMIN — Medication 400 MILLIGRAM(S): at 01:26

## 2023-07-05 RX ADMIN — Medication 3 MILLILITER(S): at 22:26

## 2023-07-05 RX ADMIN — Medication 650 MILLIGRAM(S): at 17:15

## 2023-07-05 RX ADMIN — Medication 7.5 MILLIGRAM(S): at 07:22

## 2023-07-05 RX ADMIN — Medication 650 MILLIGRAM(S): at 18:50

## 2023-07-05 RX ADMIN — Medication 1000 MILLIGRAM(S): at 01:47

## 2023-07-05 RX ADMIN — BUDESONIDE AND FORMOTEROL FUMARATE DIHYDRATE 2 PUFF(S): 160; 4.5 AEROSOL RESPIRATORY (INHALATION) at 07:23

## 2023-07-05 RX ADMIN — Medication 125 MILLILITER(S): at 00:23

## 2023-07-05 RX ADMIN — Medication 30 MILLIGRAM(S): at 10:36

## 2023-07-05 RX ADMIN — ATORVASTATIN CALCIUM 20 MILLIGRAM(S): 80 TABLET, FILM COATED ORAL at 22:26

## 2023-07-05 RX ADMIN — CHLORHEXIDINE GLUCONATE 5 MILLILITER(S): 213 SOLUTION TOPICAL at 06:38

## 2023-07-05 RX ADMIN — PIPERACILLIN AND TAZOBACTAM 25 GRAM(S): 4; .5 INJECTION, POWDER, LYOPHILIZED, FOR SOLUTION INTRAVENOUS at 22:27

## 2023-07-05 RX ADMIN — PIPERACILLIN AND TAZOBACTAM 25 GRAM(S): 4; .5 INJECTION, POWDER, LYOPHILIZED, FOR SOLUTION INTRAVENOUS at 13:59

## 2023-07-05 RX ADMIN — Medication 30 MILLIGRAM(S): at 16:12

## 2023-07-05 RX ADMIN — Medication 0.5 MILLIGRAM(S): at 06:45

## 2023-07-05 RX ADMIN — Medication 650 MILLIGRAM(S): at 11:15

## 2023-07-05 RX ADMIN — Medication 81 MILLIGRAM(S): at 06:27

## 2023-07-05 RX ADMIN — Medication 3 MILLILITER(S): at 16:11

## 2023-07-05 RX ADMIN — BUDESONIDE AND FORMOTEROL FUMARATE DIHYDRATE 2 PUFF(S): 160; 4.5 AEROSOL RESPIRATORY (INHALATION) at 19:13

## 2023-07-05 RX ADMIN — AMIODARONE HYDROCHLORIDE 300 MILLIGRAM(S): 400 TABLET ORAL at 01:42

## 2023-07-05 RX ADMIN — Medication 250 MICROGRAM(S): at 06:27

## 2023-07-05 NOTE — OCCUPATIONAL THERAPY INITIAL EVALUATION ADULT - ADDITIONAL COMMENTS
Pt states that she lives w/ her  and son in apt w/ elevator access. Pt states that she has HHA 7 days 3-4hrs/day - required assist for some ADLs and functional mobility. Pt uses cane indoors and rollator out doors; reports possession of shower chair.

## 2023-07-05 NOTE — PROGRESS NOTE ADULT - SUBJECTIVE AND OBJECTIVE BOX
PULMONARY CONSULT SERVICE FOLLOW-UP NOTE    INTERVAL HPI:  Reviewed chart and overnight events; patient seen and examined at bedside. Events of the day noted yesterday. No recurrence of hemoptysis.     MEDICATIONS:  Pulmonary:  albuterol/ipratropium for Nebulization 3 milliLiter(s) Nebulizer every 6 hours  budesonide  80 MICROgram(s)/formoterol 4.5 MICROgram(s) Inhaler 2 Puff(s) Inhalation two times a day    Antimicrobials:  piperacillin/tazobactam IVPB.. 4.5 Gram(s) IV Intermittent every 8 hours    Anticoagulants:  aspirin  chewable 81 milliGRAM(s) Oral every 24 hours  clopidogrel Tablet 75 milliGRAM(s) Oral daily    Cardiac:  diltiazem    Tablet 30 milliGRAM(s) Oral every 6 hours    Allergies    Bactrim (Other)  Shrimp (Unknown)  sulfa drugs (Unknown)  Lobster (Unknown)  unknown (Ototoxicity)    Vital Signs Last 24 Hrs  T(C): 36.2 (05 Jul 2023 08:49), Max: 36.8 (05 Jul 2023 01:11)  T(F): 97.2 (05 Jul 2023 08:49), Max: 98.3 (05 Jul 2023 01:11)  HR: 114 (05 Jul 2023 05:45) (114 - 156)  BP: 129/70 (05 Jul 2023 05:45) (105/78 - 129/70)  BP(mean): 90 (05 Jul 2023 05:45) (78 - 92)  RR: 16 (05 Jul 2023 05:45) (16 - 20)  SpO2: 95% (05 Jul 2023 05:45) (92% - 98%)    Parameters below as of 05 Jul 2023 05:45  Patient On (Oxygen Delivery Method): nasal cannula w/ humidification  O2 Flow (L/min): 4      07-04 @ 07:01  -  07-05 @ 07:00  --------------------------------------------------------  IN: 1340 mL / OUT: 1165 mL / NET: 175 mL    PHYSICAL EXAM:  Constitutional: WD  HEENT: NC/AT; anicteric sclera; MMM  Cardiovascular: +S1/S2, RRR  Respiratory: clear bilaterally; no W/R  Gastrointestinal: soft, NT/ND  Extremities: WWP; no edema, clubbing or cyanosis  Vascular: 2+ radial and pedal pulses  Neurological: AAOx3; no focal deficits    LABS:  CBC Full  -  ( 05 Jul 2023 06:01 )  WBC Count : 9.42 K/uL  RBC Count : 3.51 M/uL  Hemoglobin : 10.5 g/dL  Hematocrit : 33.9 %  Platelet Count - Automated : 215 K/uL  Mean Cell Volume : 96.6 fl  Mean Cell Hemoglobin : 29.9 pg  Mean Cell Hemoglobin Concentration : 31.0 gm/dL  Auto Neutrophil # : 6.17 K/uL  Auto Lymphocyte # : 2.00 K/uL  Auto Monocyte # : 0.83 K/uL  Auto Eosinophil # : 0.08 K/uL  Auto Basophil # : 0.17 K/uL  Auto Neutrophil % : 65.5 %  Auto Lymphocyte % : 21.2 %  Auto Monocyte % : 8.8 %  Auto Eosinophil % : 0.9 %  Auto Basophil % : 1.8 %    07-05    140  |  104  |  16  ----------------------------<  164<H>  4.1   |  24  |  0.50    Ca    8.5      05 Jul 2023 06:01  Phos  3.0     07-05  Mg     2.1     07-05    TPro  7.0  /  Alb  3.1<L>  /  TBili  0.5  /  DBili  x   /  AST  26  /  ALT  16  /  AlkPhos  91  07-05    PTT - ( 04 Jul 2023 05:30 )  PTT:31.9 sec      Urinalysis Basic - ( 05 Jul 2023 06:01 )    Color: x / Appearance: x / SG: x / pH: x  Gluc: 164 mg/dL / Ketone: x  / Bili: x / Urobili: x   Blood: x / Protein: x / Nitrite: x   Leuk Esterase: x / RBC: x / WBC x   Sq Epi: x / Non Sq Epi: x / Bacteria: x    RADIOLOGY & ADDITIONAL STUDIES: Reviewed.

## 2023-07-05 NOTE — PROGRESS NOTE ADULT - ATTENDING COMMENTS
As above, no further hemoptysis. Complete course Abx for Pseudomonas. No treatment for nontuberculous mycobacterial infection at this time; that is an outpatient mgmt decision. As above, no further hemoptysis. Complete course of 14d zosyn for Pseudomonas. No treatment for nontuberculous mycobacterial infection at this time; that is an outpatient mgmt decision.

## 2023-07-05 NOTE — PROGRESS NOTE ADULT - ASSESSMENT
73yo Cantonese-speaking F PMH HTN, HLD, CAD s/p bioAVR/CABG (11/2018 SVG-RCA, Catalan Inspiris 21mm), endocarditis ( no longer on lifetime PCN after bioAVR in 2018), AF on warfarin, AAA, PVD, COPD/ bronchiectasis ( on Breo Ellipita and Proair) ? hx MAC, with worsening cough and hemoptysis on warfarin, s/p watchman device placement ( 6/28), postop Day 1 (6/29)c/b worsening cough with hemoptysis, ENT scope showing no bleeding source, and CTA Chest did not reveal source of bleed, brought to MICU ( 6/29) intubated/paralyzed ( 6/29) for bronch with findings of blood-filled R lung, IR unable to embolize. Pt was given Tranexamic acid nebs, repeat Bronch ( 7/1) showed no active bleeding seen, frail tissues/inflammation, BAL with Pseudomonas and started on Zosyn ( 6/20 @ 2200), clinically improving and extubated on ( 7/2 am). Pt failed SLP eval ( 7/3), keep NPO/NGT feed. Pt deemed medically stable and transferred to [address] ( 7/3), failed TOV with smith cath placement ( 7/3).     # COPD  # Bronchiectasis exacerbation ( Pseudomonas)   # Massive hemoptysis   # Acute hypoxic respiratory failure   # Dysphagia  # CAD s/p CABG   # BioAVR   # Hx chronic Endocarditis ( on lifelong PCN)   # AFib s/p Watchman device ( 6/28)   # HTN  # HLD  # Depression/Anxiety  # New onset Atrial Flutter w. variable blocks( 7/4)     - Pt was transferred out of MICU to [address] (7/3)  - Transfer to [facility] (7/4) under structural heart specialist, Dr. Moose Johnson as discussed for the new onset Atrial Flutter for sanford agent and further management   - sanford agent/rate control per Dr. Cervantes  - s/p Digitalization with Digoxin 0.5mg iv followed by 0.25mg iv x2 ( 7/4-7/5)  - resumed Diltiazem 30mg po q6hr for rate controlled   - c/w telemetry   - EP consult   - Pulm f/u appreciated, c/w PCCM fellow that pt has hx MAC, will get collateral from primary Pulm Dr. Efren Wise  - Bronchiectasis workup per Pulm: alpha 1 antitrypsin, ANCAs, aspergillus ab, CCP, CF expanded panel, IgE total, immunoglobulins, protein electrophoresis, RF, and Sjogrens  -s/p Cefazolin 2g q8h x5 doses (from OR after Watchman)  - c/w anti-pseudomonal coverage: piperacillin/tazobactam IVPB.. 4.5 Gram(s) IV Intermittent every 8 hours ( 6/30 @ 2200) Day 5, to d/w Pulm re: duration of iv abx and possible transition to oral abx coverage ( patient apparently no longer on lifetime PCN ever [place] [facility] as clarified with Cardiologist Dr. Mitul Gonzalez)   - WBC 7.24K (7/4)-> 9.42K(7/5) WNL   - BCx: NGTD  - O2 via NC 4L, titrate to keep SpO2>90%  - ICS/LABA: budesonide  80 MICROgram(s)/formoterol 4.5 MICROgram(s) Inhaler 2 Puff(s) Inhalation two times a day  ( uses Albuterol 90mcg/inh 2 puffs q4h PRN and Breo Ellipta 100/25 mcg/INH at home)   - STEWART/ROSSY: albuterol/ipratropium for Nebulization 3 milliLiter(s) Nebulizer every 6 hours ( Proair prn at home)   - consider mucolytic: chest PT, suction as needed   - s/p Extubation (7/2)  - SLP eval appreciated( 7/3): keep NPO for dysphagia, strict oral hygiene, SLP f/u today ( 7/5)   - c/w NGT feed w. Jevity @ 4ml/hr   - DAPT: c/w ASA 81mg and Clopidogrel 75mg po daily for at least 45 days and will need repeat CT as d/w    - HLD: Atorvastatin 20mg po qHS ( held while NPO)   - hx Anxiety:        clonazePAM  Tablet 0.5 milliGRAM(s) Oral two times a day       busPIRone 7.5 milliGRAM(s) Oral q HS <User Schedule>       QUEtiapine 25 milliGRAM(s) Oral daily   - Smith d/c'd ( 7/3) but failed TOV with smith cath replacement ( 7/3), for repeat TOV once ambulating   - PT eval please     N: NPO/NGT feed  GI ppx: protonix 40mg  DVT ppx: holding pharmacologic ppx for now given hemoptysis; SCDs  Code status: FULL CODE    Dispo: medically active as d/w structural heart      PMD: Dr.Helang Philippe Marvin ( [facility]) [phone]  Pulm; Dr. Efren Wise [phone]  [facility]: Dr. Mitul Gonzalez/ Dr. Nubia Minor [phone]  [facility] [employer] [phone] ( [address] [place] [address])   Family contact: Joseph Vazquez (son) [phone]      POC as d/w 9 [place] team and Dr Nash         71yo Cantonese-speaking F PMH HTN, HLD, CAD s/p bioAVR/CABG (11/2018 SVG-RCA, Catalan Inspiris 21mm), endocarditis ( no longer on lifetime PCN after bioAVR in 2018), AF on warfarin, AAA, PVD, COPD/ bronchiectasis ( on Breo Ellipita and Proair) ? hx MAC, with worsening cough and hemoptysis on warfarin, s/p watchman device placement ( 6/28), postop Day 1 (6/29)c/b worsening cough with hemoptysis, ENT scope showing no bleeding source, and CTA Chest did not reveal source of bleed, brought to MICU ( 6/29) intubated/paralyzed ( 6/29) for bronch with findings of blood-filled R lung, IR unable to embolize. Pt was given Tranexamic acid nebs, repeat Bronch ( 7/1) showed no active bleeding seen, frail tissues/inflammation, BAL with Pseudomonas and started on Zosyn ( 6/20 @ 2200), clinically improving and extubated on ( 7/2 am). Pt failed SLP eval ( 7/3), keep NPO/NGT feed. Pt deemed medically stable and transferred to [address] ( 7/3), failed TOV with smith cath placement ( 7/3).     # COPD  # Bronchiectasis exacerbation ( Pseudomonas)   # Massive hemoptysis   # Acute hypoxic respiratory failure   # Dysphagia  # CAD s/p CABG   # BioAVR   # Hx chronic Endocarditis ( on lifelong PCN)   # AFib s/p Watchman device ( 6/28)   # HTN  # HLD  # Depression/Anxiety  # New onset Atrial Flutter w. variable blocks( 7/4)     - Pt was transferred out of MICU to [address] (7/3)  - Transfer to [facility] (7/4) under structural heart specialist, Dr. Moose Johnson as discussed for the new onset Atrial Flutter for sanford agent and further management   - sanford agent/rate control per Dr. Cervantes  - s/p Digitalization with Digoxin 0.5mg iv followed by 0.25mg iv x2 ( 7/4-7/5)  - resumed Diltiazem 30mg po q6hr for rate controlled   - c/w telemetry   - EP consult   - Pulm f/u appreciated, d/w PCCM fellow   - Collateral obtained from primary Pulm Dr. 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 4.5 Gram(s) IV Intermittent every 8 hours ( 6/30 @ 2200) Day 5, to d/w Pulm re: duration of iv abx and possible transition to oral abx coverage ( patient apparently no longer on lifetime PCN ever since bioAVR at [facility] in [place] as clarified with Cardiologist Dr. Mitul Gonzalez)   - WBC 7.24K (7/4)-> 9.42K(7/5) WNL   - BCx: NGTD  - O2 via NC 4L, titrate to keep SpO2>90%  - ICS/LABA: budesonide  80 MICROgram(s)/formoterol 4.5 MICROgram(s) Inhaler 2 Puff(s) Inhalation two times a day  ( uses Albuterol 90mcg/inh 2 puffs q4h PRN and Breo Ellipta 100/25 mcg/INH at home)   - STEWART/ROSSY: albuterol/ipratropium for Nebulization 3 milliLiter(s) Nebulizer every 6 hours ( Proair prn at home)   - consider mucolytic: chest PT, suction as needed   - s/p Extubation (7/2)  - SLP eval appreciated( 7/3): keep NPO for dysphagia, strict oral hygiene, SLP f/u today ( 7/5)   - c/w NGT feed w. Jevity @ 4ml/hr   - DAPT: c/w ASA 81mg and Clopidogrel 75mg po daily for at least 45 days and will need repeat CT as d/w    - HLD: Atorvastatin 20mg po qHS ( held while NPO)   - hx Anxiety:        clonazePAM  Tablet 0.5 milliGRAM(s) Oral two times a day       busPIRone 7.5 milliGRAM(s) Oral q HS <User Schedule>       QUEtiapine 25 milliGRAM(s) Oral daily   - Smith d/c'd ( 7/3) but failed TOV with smith cath replacement ( 7/3), for repeat TOV once ambulating   - PT eval please     N: NPO/NGT feed  GI ppx: protonix 40mg  DVT ppx: holding pharmacologic ppx for now given hemoptysis; SCDs  Code status: FULL CODE    Dispo: medically active as d/w structural heart      PMD: Dr.Helang Philippe Marvin ( [facility]) [phone]  Pulm; Dr. Efren Wise [phone]  [facility]: Dr. Mitul Gonzalez/ Dr. Nubia Minor [phone]  [facility] [phone] ( [address], [place] [address])   Family contact: Joseph Vazquez (son) [phone]      POC as d/w 9 [place] team and

## 2023-07-05 NOTE — OCCUPATIONAL THERAPY INITIAL EVALUATION ADULT - MD ORDER
Presents w/ Bronchiectasis worsening cough with hemoptysis  A-Fib   S/P Left atrial appendage occlusion with watchmen device on 6/28/23

## 2023-07-05 NOTE — PROGRESS NOTE ADULT - ASSESSMENT
73 y/o Female with PMHx of HTN, HLD, CAD s/p bioAVR/CABG with Dr. Mitul Ly (11/2018 SVG-RCA, Catalan Inspiris 21mm), hx of Endocarditis on lifelong penicillin, AFib, AAA, PVD, presented for watchmen procedure complicated by massive hemoptysis. Pulmonary consulted for hemoptysis.     #Massive hemoptysis   #Bronchiectasis     Bronchiectasis without evidence of bronchial artery bleed on CT angio. Therefore likely source of bleeding was bronchiectasis. We reviewed some data with Dr. Fan and I will reach out to patient's former pulmonologist Dr. Efren Wise and current pulmonologist Dr. Hood Cook - she says she has a history of MAC colonization. Continue full course of antibiotics for Pseudomonas in sputum. On DAPT after her Watchman.    Plan:  - Agree with treating Pseudomonas in sputum  - Will obtain further information about her history of bronchiectasis    Patient discussed with Dr. Larson. 71 y/o Female with PMHx of HTN, HLD, CAD s/p bioAVR/CABG with Dr. Mitul Ly (11/2018 SVG-RCA, Catalan Inspiris 21mm), hx of Endocarditis on lifelong penicillin, AFib, AAA, PVD, presented for watchmen procedure complicated by massive hemoptysis. Pulmonary consulted for hemoptysis.     #Massive hemoptysis   #Bronchiectasis     Bronchiectasis without evidence of bronchial artery bleed on CT angio. Therefore likely source of bleeding was bronchiectasis. We reviewed some data with Dr. Fan and patient's pulmonologist Dr. Efren Wise and will reach out to former pulmonologist Dr. Hood Cook. Patient has a history of MAC colonization- culture from 6/16-6/18/2023 grew M fortuitum. She will share CT chest and sputum culture results with us and Dr. Fan. Continue full course of antibiotics for Pseudomonas in sputum. Sputum here has been negative AFB. On DAPT after her Watchman.    Plan:  - Agree with treating Pseudomonas in sputum  - Please call us if there is a recurrence in hemoptysis    Patient discussed with Dr. Larson. 73 y/o Female with PMHx of HTN, HLD, CAD s/p bioAVR/CABG with Dr. Mitul Ly (11/2018 SVG-RCA, Catalan Inspiris 21mm), hx of Endocarditis on lifelong penicillin, AFib, AAA, PVD, presented for watchmen procedure complicated by massive hemoptysis. Pulmonary consulted for hemoptysis.     #Massive hemoptysis   #Bronchiectasis     Bronchiectasis without evidence of bronchial artery bleed on CT angio. Therefore likely source of bleeding was bronchiectasis. We reviewed some data with Dr. Fan and patient's pulmonologist Dr. Efren Wise. Patient has a history of NTM colonization- culture from 6/16 AND 6/18/2023 grew M fortuitum. She will share CT chest results with us and Dr. Fan. Continue full course of antibiotics for Pseudomonas in sputum. Sputum here has been negative AFB. On DAPT after her Watchman.    Plan:  Agree with treating Pseudomonas in sputum for full course.    Patient discussed with Dr. Larson. Please call us if there is a recurrence in hemoptysis 73 y/o Female with PMHx of HTN, HLD, CAD s/p bioAVR/CABG with Dr. Mitul Ly (11/2018 SVG-RCA, Catalan Inspiris 21mm), hx of Endocarditis on lifelong penicillin, AFib, AAA, PVD, presented for watchmen procedure complicated by massive hemoptysis. Pulmonary consulted for hemoptysis.     #Massive hemoptysis   #Bronchiectasis     Bronchiectasis without evidence of bronchial artery bleed on CT angio. Therefore likely source of bleeding was bronchiectasis. We reviewed some data with Dr. Fan and patient's pulmonologist Dr. Efren Wise. Patient has a history of NTM colonization- culture from 6/16 AND 6/18/2023 grew M fortuitum. She will share CT chest results with us and Dr. Fan. Continue full course of antibiotics for Pseudomonas in sputum. Sputum here has been negative AFB. On DAPT after her Watchman.    Plan:  Agree with treating Pseudomonas in sputum for full course  - Patient has sore throat and would suggest trying spray    Patient discussed with Dr. Larson. Please call us if there is a recurrence in hemoptysis 73 y/o Female with PMHx of HTN, HLD, CAD s/p bioAVR/CABG with Dr. Mitul Ly (11/2018 SVG-RCA, Catalan Inspiris 21mm), hx of Endocarditis on lifelong penicillin, AFib, AAA, PVD, presented for watchmen procedure complicated by massive hemoptysis. Pulmonary consulted for hemoptysis.     #Massive hemoptysis   #Bronchiectasis     Bronchiectasis without evidence of bronchial artery bleed on CT angio. Therefore likely source of bleeding was bronchiectasis. We reviewed some data with Dr. Fan and patient's pulmonologist Dr. Efren Wise. Patient has a history of NTM colonization- culture from 6/16, 6/17, AND 6/18/2023 grew M fortuitum. CT chest from 3/7/2023 read was reviewed and there are tree in bud nodules, chronic mild bronchiectasis in RML and lingula associated with volume loss and peribronchial airspace opacity, and suggestion of areas of RUL focal bronchiectasis with peribronchial thickening which have progressed from prior. Continue full course of antibiotics for Pseudomonas in sputum. Sputum here has been negative AFB. On DAPT after her Watchman.    Plan:  Agree with treating Pseudomonas in sputum for full course  - Patient has sore throat and would suggest trying spray    Patient discussed with Dr. Larson. Please call us if there is a recurrence in hemoptysis 71 y/o Female with PMHx of HTN, HLD, CAD s/p bioAVR/CABG with Dr. Mitul Ly (11/2018 SVG-RCA, Catalan Inspiris 21mm), hx of Endocarditis on lifelong penicillin, AFib, AAA, PVD, presented for watchmen procedure complicated by massive hemoptysis. Pulmonary consulted for hemoptysis.     #Massive hemoptysis   #Bronchiectasis     Bronchiectasis without evidence of bronchial artery bleed on CT angio. Therefore likely source of bleeding was bronchiectasis. We reviewed some data with Dr. Fan and patient's pulmonologist Dr. Efren Wise. Patient has a history of NTM colonization- culture from 6/16, 6/17, AND 6/18/2023 grew M fortuitum. CT chest from 3/7/2023 read was reviewed and there are tree in bud nodules, chronic mild bronchiectasis in RML and lingula associated with volume loss and peribronchial airspace opacity, and suggestion of areas of RUL focal bronchiectasis with peribronchial thickening which have progressed from prior. Continue full course of antibiotics for Pseudomonas in sputum. Sputum here has been negative AFB. On DAPT after her Watchman.    Plan:  Agree with treating Pseudomonas in sputum for full course 14 days total  - Patient has sore throat and would suggest trying spray    Patient discussed with Dr. Larson. Please call us if there is a recurrence in hemoptysis

## 2023-07-05 NOTE — PROGRESS NOTE ADULT - ASSESSMENT
A/P:    71yo Cantonese-speaking F PMHx HTN, HLD, CAD s/p bioAVR/CABG (11/2018 SVG-RCA, Catalan Inspiris 21mm), endocarditis ( no longer on lifetime PCN after bioAVR in 2018), AF on warfarin, AAA, PVD, COPD/ bronchiectasis ( on Breo Ellipita and Proair) ? hx MAC, with worsening cough and hemoptysis on warfarin who underwent NOEMÍ closure with Watchman device on 6/28/23 with Dr. Cervantes. POD1 noted to be having worsening cough an hemoptysis. ENT consulted, bedside scope with no bleeding source. CTA chest also negative for acute pathology. Pulmonology was consulted and she was transferred to the MICU on 6/29 where she was intubated and bronchoscopy was preformed, significant for RLL bleed. IR was unable to embolize bleed. Patient was given Tranexamic acid and repeat bronch 7/1 negative or active bleeding. BAL grew pseudomonas and she was started on course of zosyn. Post extubation patient was evaluated by SLP and did not tolerate PO. NGT was placed and TFs started. On 7/4 patient went into rapid AF and was transferred back to [address] under Dr. Cervantes for further management.     Neurovascular:   - No delirium. Pain well controlled with current regimen.  - Continue tylenol PRN  - Continue home klonopin 0.5 mg q12 hours, buspirone 7.5 mg daily, and seroquel 25 mg daily     Cardiovascular:   - POD7 s/p NOEMÍ with Watchman device  - Hx AF with rapid AF today, rates to 150. Continue diltiazem 30 mg q6 hours, digoxin 250 mcg daily (s/p IV dig load, dig level ordered for AM).   - Hx of HTN, BP controlled (112//85)  - Continue ASA, Plavix post Watchman device     Respiratory:   - 02 Sat = 98% on RA.  -Encourage C+DB and Use of IS 10x / hr while awake.  -CXR no acute pathology     GI:   - Post op SLP evaluation, unable to tolerate PO, on TFs  - SLP to re-evaluate today, possible advancement of diet   -Continue GI PPX with protonix    Renal / :  - BUN/Cr stable at 16/0.50  -Continue to monitor renal function.  -Monitor I/O's.  - Replete electrolytes PRN    Endocrine:    -A1c pending, no known hx DM  -TSH pending, no known hx thyroid disease     Hematologic:  -H/H stable at 10.5/33.9 with no obvious signs of bleeding  -Coagulation Panel.    ID:  -Sputum 7/1 growing pseudomonas, continue zosyn 4.5 q8 hours   -Continue to monitor CBC  -Observe for SIRS/Sepsis Syndrome.    Prophylaxis:  -No SQH given post op hemoptysis   -SCD's    Disposition:  -Home when medically appropriate.

## 2023-07-05 NOTE — OCCUPATIONAL THERAPY INITIAL EVALUATION ADULT - PERTINENT HX OF CURRENT PROBLEM, REHAB EVAL
71 y/o Female with PMHx of HTN, HLD, CAD s/p bioAVR/CABG with Dr. Mitul Ly (11/2018 SVG-RCA, Catalan Inspiris 21mm), hx of Endocarditis on lifelong penicillin, AFib, AAA, PVD, presented with bronchiectasis worsening cough with hemoptysis on warfarin which has been stopped, referred for pLAAo evaluation. Cantonese speaking,  was used. Patient underwent CT Heart LA protocol today. Reports no hemopytsis while coumadin is off. Denies CP, SOB, palpitations, orthopnea, LE edema. Patient seen in same day holding area;

## 2023-07-05 NOTE — PROGRESS NOTE ADULT - SUBJECTIVE AND OBJECTIVE BOX
Patient is a 72y old  Female who presents with a chief complaint of valvular disease   tachycardiac (04 Jul 2023 15:16)    INTERVAL EVENTS: Transferred to [facility] for new onset AFlutter w. RVR. s/p Diltiazem 30mg po x1 followed by Digitalization ( 0.5mg iv followed by 0.25mg iv x2 )     SUBJECTIVE:  Patient was seen and examined at bedside. OOBTC on NC 4L with SpO2 96%, NGT in place, AFlutter rate controlled on monitor. Pt reports cough w. sputum, in good spirits, denies pain, CP, SOB,etc.     Review of systems: No fever, chills, dizziness, HA, Changes in vision, CP, dyspnea, nausea or vomiting, dysuria, changes in bowel movements, LE edema. Rest of 12 point Review of systems negative unless otherwise documented elsewhere in note.     Diet, NPO with Tube Feed:   Tube Feeding Modality: Nasogastric  Jevity 1.2 Mayank (JEVITY1.2RTH)  Total Volume for 24 Hours (mL): 720  Total Number of Cans: 3  Continuous  Starting Tube Feed Rate mL per Hour: 40  Until Goal Tube Feed Rate (mL per Hour): 40  Tube Feed Duration (in Hours): 18  Tube Feed Start Time: 01:00  Tube Feed Stop Time: 07:00 (07-04-23 @ 06:10) [Active]      MEDICATIONS:  MEDICATIONS  (STANDING):  albuterol/ipratropium for Nebulization 3 milliLiter(s) Nebulizer every 6 hours  aspirin  chewable 81 milliGRAM(s) Oral every 24 hours  atorvastatin 20 milliGRAM(s) Oral at bedtime  budesonide  80 MICROgram(s)/formoterol 4.5 MICROgram(s) Inhaler 2 Puff(s) Inhalation two times a day  busPIRone 7.5 milliGRAM(s) Oral <User Schedule>  chlorhexidine 0.12% Liquid 5 milliLiter(s) Oral Mucosa two times a day  chlorhexidine 2% Cloths 1 Application(s) Topical <User Schedule>  clonazePAM  Tablet 0.5 milliGRAM(s) Oral two times a day  clopidogrel Tablet 75 milliGRAM(s) Oral daily  diltiazem    Tablet 30 milliGRAM(s) Oral every 6 hours  pantoprazole   Suspension 40 milliGRAM(s) Oral daily  piperacillin/tazobactam IVPB.. 4.5 Gram(s) IV Intermittent every 8 hours  polyethylene glycol 3350 17 Gram(s) Oral daily  QUEtiapine 25 milliGRAM(s) Oral daily    MEDICATIONS  (PRN):  acetaminophen   Oral Liquid .. 650 milliGRAM(s) Oral every 6 hours PRN Mild Pain (1 - 3)      Allergies    Bactrim (Other)  Shrimp (Unknown)  sulfa drugs (Unknown)  Lobster (Unknown)  unknown (Ototoxicity)    Intolerances        OBJECTIVE:  Vital Signs Last 24 Hrs  T(C): 36.2 (05 Jul 2023 08:49), Max: 36.8 (05 Jul 2023 01:11)  T(F): 97.2 (05 Jul 2023 08:49), Max: 98.3 (05 Jul 2023 01:11)  HR: 138 (05 Jul 2023 10:00) (106 - 156)  BP: 106/72 (05 Jul 2023 10:00) (105/53 - 129/70)  BP(mean): 81 (05 Jul 2023 10:00) (70 - 92)  RR: 18 (05 Jul 2023 08:40) (16 - 20)  SpO2: 95% (05 Jul 2023 10:00) (92% - 98%)    Parameters below as of 05 Jul 2023 10:00  Patient On (Oxygen Delivery Method): nasal cannula  O2 Flow (L/min): 4    I&O's Summary    04 Jul 2023 07:01  -  05 Jul 2023 07:00  --------------------------------------------------------  IN: 1340 mL / OUT: 1165 mL / NET: 175 mL    05 Jul 2023 07:01  -  05 Jul 2023 11:18  --------------------------------------------------------  IN: 80 mL / OUT: 0 mL / NET: 80 mL        PHYSICAL EXAM:  Gen: Reclining in bed at time of exam, appears stated age  HEENT: NCAT, MMM, clear OP  Neck: supple, trachea at midline  CV: irreg S1/S2  Pulm: adequate respiratory effort, no increase in work of breathing, coarse crackles lung base  Abd: soft, NTND  Skin: warm and dry,   Ext: WWP, no LE edema  Neuro: AOx3, no gross focal neurological deficits  Psych: affect and behavior appropriate, pleasant at time of interview     LABS:                        10.5   9.42  )-----------( 215      ( 05 Jul 2023 06:01 )             33.9     07-05    140  |  104  |  16  ----------------------------<  164<H>  4.1   |  24  |  0.50    Ca    8.5      05 Jul 2023 06:01  Phos  3.0     07-05  Mg     2.1     07-05    TPro  7.0  /  Alb  3.1<L>  /  TBili  0.5  /  DBili  x   /  AST  26  /  ALT  16  /  AlkPhos  91  07-05    LIVER FUNCTIONS - ( 05 Jul 2023 06:01 )  Alb: 3.1 g/dL / Pro: 7.0 g/dL / ALK PHOS: 91 U/L / ALT: 16 U/L / AST: 26 U/L / GGT: x           PTT - ( 04 Jul 2023 05:30 )  PTT:31.9 sec  CAPILLARY BLOOD GLUCOSE        Urinalysis Basic - ( 05 Jul 2023 06:01 )    Color: x / Appearance: x / SG: x / pH: x  Gluc: 164 mg/dL / Ketone: x  / Bili: x / Urobili: x   Blood: x / Protein: x / Nitrite: x   Leuk Esterase: x / RBC: x / WBC x   Sq Epi: x / Non Sq Epi: x / Bacteria: x        MICRODATA:      RADIOLOGY/OTHER STUDIES:

## 2023-07-05 NOTE — OCCUPATIONAL THERAPY INITIAL EVALUATION ADULT - PLANNED THERAPY INTERVENTIONS, OT EVAL
ADL retraining/IADL retraining/balance training/bed mobility training/cognitive, visual perceptual/fine motor coordination training/motor coordination training/neuromuscular re-education/parent/caregiver training.../ROM/strengthening

## 2023-07-05 NOTE — OCCUPATIONAL THERAPY INITIAL EVALUATION ADULT - GENERAL OBSERVATIONS, REHAB EVAL
Pt's RN Crystal aware of intent to eval/tx; cleared Pt. Pt received in chair - +telemetry, heplock, IVs, NGT and IVs (held by RN for activities)luis. Pt agreeable to OT. PT Margie seaman.

## 2023-07-05 NOTE — PROGRESS NOTE ADULT - SUBJECTIVE AND OBJECTIVE BOX
Patient discussed on morning rounds with Dr. Cervantes    OPERATION & DATE: 6/28 NOEMÍ Closure with Watchman Device     SUBJECTIVE ASSESSMENT:  Assessed at bedside with Tera  ID #[id number]. Patient feels OK, complains of pain with cough. Denies SOB. Having BMs. Using her Is and pulling only 500 CC. Ambulating with assistance.     VITAL SIGNS:  Vital Signs Last 24 Hrs  T(C): 36.1 (05 Jul 2023 14:18), Max: 36.8 (05 Jul 2023 01:11)  T(F): 96.9 (05 Jul 2023 14:18), Max: 98.3 (05 Jul 2023 01:11)  HR: 72 (05 Jul 2023 13:50) (72 - 156)  BP: 101/51 (05 Jul 2023 13:50) (101/51 - 129/70)  BP(mean): 69 (05 Jul 2023 13:50) (69 - 92)  RR: 18 (05 Jul 2023 13:50) (16 - 20)  SpO2: 97% (05 Jul 2023 13:50) (92% - 99%)    Parameters below as of 05 Jul 2023 13:50  Patient On (Oxygen Delivery Method): nasal cannula w/ humidification  O2 Flow (L/min): 4    I&O's Detail    04 Jul 2023 07:01  -  05 Jul 2023 07:00  --------------------------------------------------------  IN:    Enteral Tube Flush: 330 mL    IV PiggyBack: 450 mL    Jevity 1.2: 560 mL  Total IN: 1340 mL    OUT:    Indwelling Catheter - Urethral (mL): 990 mL    Intermittent Catheterization - Urethral (mL): 175 mL  Total OUT: 1165 mL    Total NET: 175 mL      05 Jul 2023 07:01  -  05 Jul 2023 15:09  --------------------------------------------------------  IN:    Enteral Tube Flush: 50 mL    Jevity 1.2: 320 mL  Total IN: 370 mL    OUT:    Indwelling Catheter - Urethral (mL): 200 mL    Voided (mL): 200 mL  Total OUT: 400 mL    Total NET: -30 mL    CHEST TUBE:  No  LOUISE DRAIN:  No  EPICARDIAL WIRES: No   STITCHES: No  STAPLES: No  OBREGON:  No  CENTRAL LINE: No  MIDLINE/PICC: No  WOUND VAC: No    Physical Exam  CONSTITUTIONAL: Well appearing in NAD assessed laying comfortably in bed   NEURO: A&OX3. No focal deficits noted, moving bilateral upper and lower extremities                    CV: RRR, no murmurs, rubs, gallops  RESPIRATORY: Clear to auscultation bilateral posterior lung fields, no wheezes, rales, rhonchi   GI: +BS, NT/ND  MUSKULOSKELETAL: No peripheral edema or calf tenderness. Full strength and ROM bilateral upper and lower extremities   VASCULAR: Bilateral distal pulses 2+  INCISIONS: Groins soft, no hematoma     LABS:                        10.5   9.42  )-----------( 215      ( 05 Jul 2023 06:01 )             33.9     PTT - ( 04 Jul 2023 05:30 )  PTT:31.9 sec  07-05    140  |  104  |  16  ----------------------------<  164<H>  4.1   |  24  |  0.50    Ca    8.5      05 Jul 2023 06:01  Phos  3.0     07-05  Mg     2.1     07-05    TPro  7.0  /  Alb  3.1<L>  /  TBili  0.5  /  DBili  x   /  AST  26  /  ALT  16  /  AlkPhos  91  07-05    Urinalysis Basic - ( 05 Jul 2023 06:01 )    Color: x / Appearance: x / SG: x / pH: x  Gluc: 164 mg/dL / Ketone: x  / Bili: x / Urobili: x   Blood: x / Protein: x / Nitrite: x   Leuk Esterase: x / RBC: x / WBC x   Sq Epi: x / Non Sq Epi: x / Bacteria: x      MEDICATIONS  (STANDING):  albuterol/ipratropium for Nebulization 3 milliLiter(s) Nebulizer every 6 hours  aspirin  chewable 81 milliGRAM(s) Oral every 24 hours  atorvastatin 20 milliGRAM(s) Oral at bedtime  budesonide  80 MICROgram(s)/formoterol 4.5 MICROgram(s) Inhaler 2 Puff(s) Inhalation two times a day  busPIRone 7.5 milliGRAM(s) Oral <User Schedule>  chlorhexidine 0.12% Liquid 5 milliLiter(s) Oral Mucosa two times a day  chlorhexidine 2% Cloths 1 Application(s) Topical <User Schedule>  clonazePAM  Tablet 0.5 milliGRAM(s) Oral two times a day  clopidogrel Tablet 75 milliGRAM(s) Oral daily  diltiazem    Tablet 30 milliGRAM(s) Oral every 6 hours  pantoprazole   Suspension 40 milliGRAM(s) Oral daily  piperacillin/tazobactam IVPB.. 4.5 Gram(s) IV Intermittent every 8 hours  polyethylene glycol 3350 17 Gram(s) Oral daily  QUEtiapine 25 milliGRAM(s) Oral daily    MEDICATIONS  (PRN):  acetaminophen   Oral Liquid .. 650 milliGRAM(s) Oral every 6 hours PRN Mild Pain (1 - 3)    RADIOLOGY & ADDITIONAL TESTS:    x< from: Xray Chest 1 View- PORTABLE-Urgent (Xray Chest 1 View- PORTABLE-Urgent .) (07.04.23 @ 07:45) >  IMPRESSION:  Interval advancement of weighted enteric tube, sidehole overlying the   left upper quadrant. Small left pleural effusion. Congestion and/or   infiltrates.    < end of copied text >

## 2023-07-06 LAB
A1C WITH ESTIMATED AVERAGE GLUCOSE RESULT: 5.9 % — HIGH (ref 4–5.6)
ANA TITR SER: NEGATIVE — SIGNIFICANT CHANGE UP
APTT BLD: 32.2 SEC — SIGNIFICANT CHANGE UP (ref 27.5–35.5)
DIGOXIN SERPL-MCNC: 1.1 NG/ML — SIGNIFICANT CHANGE UP (ref 0.8–2)
DSDNA AB FLD-ACNC: <0.2 AI — SIGNIFICANT CHANGE UP
ENA JO1 AB SER-ACNC: <0.2 AI — SIGNIFICANT CHANGE UP
ENA SS-A AB FLD IA-ACNC: <0.2 AI — SIGNIFICANT CHANGE UP
ESTIMATED AVERAGE GLUCOSE: 123 MG/DL — HIGH (ref 68–114)
HCT VFR BLD CALC: 32 % — LOW (ref 34.5–45)
HGB BLD-MCNC: 10.2 G/DL — LOW (ref 11.5–15.5)
INR BLD: 1.13 — SIGNIFICANT CHANGE UP (ref 0.88–1.16)
MAGNESIUM SERPL-MCNC: 2 MG/DL — SIGNIFICANT CHANGE UP (ref 1.6–2.6)
MCHC RBC-ENTMCNC: 29.9 PG — SIGNIFICANT CHANGE UP (ref 27–34)
MCHC RBC-ENTMCNC: 31.9 GM/DL — LOW (ref 32–36)
MCV RBC AUTO: 93.8 FL — SIGNIFICANT CHANGE UP (ref 80–100)
NRBC # BLD: 0 /100 WBCS — SIGNIFICANT CHANGE UP (ref 0–0)
PHOSPHATE SERPL-MCNC: 3.7 MG/DL — SIGNIFICANT CHANGE UP (ref 2.5–4.5)
PLATELET # BLD AUTO: 282 K/UL — SIGNIFICANT CHANGE UP (ref 150–400)
PROTHROM AB SERPL-ACNC: 13.5 SEC — HIGH (ref 10.5–13.4)
RBC # BLD: 3.41 M/UL — LOW (ref 3.8–5.2)
RBC # FLD: 13.3 % — SIGNIFICANT CHANGE UP (ref 10.3–14.5)
TSH SERPL-MCNC: 1.85 UIU/ML — SIGNIFICANT CHANGE UP (ref 0.27–4.2)
WBC # BLD: 11.03 K/UL — HIGH (ref 3.8–10.5)
WBC # FLD AUTO: 11.03 K/UL — HIGH (ref 3.8–10.5)

## 2023-07-06 PROCEDURE — 99232 SBSQ HOSP IP/OBS MODERATE 35: CPT

## 2023-07-06 PROCEDURE — 71045 X-RAY EXAM CHEST 1 VIEW: CPT | Mod: 26

## 2023-07-06 PROCEDURE — 99233 SBSQ HOSP IP/OBS HIGH 50: CPT

## 2023-07-06 RX ORDER — FUROSEMIDE 40 MG
40 TABLET ORAL DAILY
Refills: 0 | Status: DISCONTINUED | OUTPATIENT
Start: 2023-07-07 | End: 2023-07-07

## 2023-07-06 RX ORDER — FUROSEMIDE 40 MG
20 TABLET ORAL ONCE
Refills: 0 | Status: COMPLETED | OUTPATIENT
Start: 2023-07-06 | End: 2023-07-06

## 2023-07-06 RX ADMIN — Medication 3 MILLILITER(S): at 10:04

## 2023-07-06 RX ADMIN — Medication 30 MILLIGRAM(S): at 10:04

## 2023-07-06 RX ADMIN — Medication 30 MILLIGRAM(S): at 03:29

## 2023-07-06 RX ADMIN — BUDESONIDE AND FORMOTEROL FUMARATE DIHYDRATE 2 PUFF(S): 160; 4.5 AEROSOL RESPIRATORY (INHALATION) at 18:53

## 2023-07-06 RX ADMIN — Medication 0.5 MILLIGRAM(S): at 05:40

## 2023-07-06 RX ADMIN — Medication 0.5 MILLIGRAM(S): at 17:43

## 2023-07-06 RX ADMIN — Medication 3 MILLILITER(S): at 17:43

## 2023-07-06 RX ADMIN — CHLORHEXIDINE GLUCONATE 1 APPLICATION(S): 213 SOLUTION TOPICAL at 05:33

## 2023-07-06 RX ADMIN — Medication 3 MILLILITER(S): at 22:03

## 2023-07-06 RX ADMIN — Medication 30 MILLIGRAM(S): at 15:40

## 2023-07-06 RX ADMIN — QUETIAPINE FUMARATE 25 MILLIGRAM(S): 200 TABLET, FILM COATED ORAL at 17:43

## 2023-07-06 RX ADMIN — ATORVASTATIN CALCIUM 20 MILLIGRAM(S): 80 TABLET, FILM COATED ORAL at 22:03

## 2023-07-06 RX ADMIN — BUDESONIDE AND FORMOTEROL FUMARATE DIHYDRATE 2 PUFF(S): 160; 4.5 AEROSOL RESPIRATORY (INHALATION) at 05:40

## 2023-07-06 RX ADMIN — PIPERACILLIN AND TAZOBACTAM 25 GRAM(S): 4; .5 INJECTION, POWDER, LYOPHILIZED, FOR SOLUTION INTRAVENOUS at 22:03

## 2023-07-06 RX ADMIN — Medication 30 MILLIGRAM(S): at 23:58

## 2023-07-06 RX ADMIN — Medication 20 MILLIGRAM(S): at 11:31

## 2023-07-06 RX ADMIN — CLOPIDOGREL BISULFATE 75 MILLIGRAM(S): 75 TABLET, FILM COATED ORAL at 11:31

## 2023-07-06 RX ADMIN — Medication 3 MILLILITER(S): at 05:34

## 2023-07-06 RX ADMIN — Medication 7.5 MILLIGRAM(S): at 05:40

## 2023-07-06 RX ADMIN — PIPERACILLIN AND TAZOBACTAM 25 GRAM(S): 4; .5 INJECTION, POWDER, LYOPHILIZED, FOR SOLUTION INTRAVENOUS at 14:11

## 2023-07-06 RX ADMIN — Medication 250 MICROGRAM(S): at 05:46

## 2023-07-06 RX ADMIN — PANTOPRAZOLE SODIUM 40 MILLIGRAM(S): 20 TABLET, DELAYED RELEASE ORAL at 11:31

## 2023-07-06 RX ADMIN — Medication 81 MILLIGRAM(S): at 05:40

## 2023-07-06 RX ADMIN — PIPERACILLIN AND TAZOBACTAM 25 GRAM(S): 4; .5 INJECTION, POWDER, LYOPHILIZED, FOR SOLUTION INTRAVENOUS at 05:41

## 2023-07-06 NOTE — PROGRESS NOTE ADULT - ASSESSMENT
73yo Cantonese-speaking F PMH HTN, HLD, CAD s/p bioAVR/CABG (11/2018 SVG-RCA, Catalan Inspiris 21mm), endocarditis ( no longer on lifetime PCN after bioAVR in 2018), AF on warfarin, AAA, PVD, COPD/ bronchiectasis ( on Breo Ellipita and Proair) ? hx MAC, with worsening cough and hemoptysis on warfarin, s/p watchman device placement ( 6/28), postop Day 1 (6/29)c/b worsening cough with hemoptysis, ENT scope showing no bleeding source, and CTA Chest did not reveal source of bleed, brought to MICU ( 6/29) intubated/paralyzed ( 6/29) for bronch with findings of blood-filled R lung, IR unable to embolize. Pt was given Tranexamic acid nebs, repeat Bronch ( 7/1) showed no active bleeding seen, frail tissues/inflammation, BAL with Pseudomonas and started on Zosyn ( 6/20 @ 2200), clinically improving and extubated on ( 7/2 am). Pt failed SLP eval ( 7/3), keep NPO/NGT feed. Pt deemed medically stable and transferred to [address] ( 7/3), failed TOV with smith cath placement ( 7/3).     # COPD  # Bronchiectasis exacerbation ( Pseudomonas)   # Massive hemoptysis   # Acute hypoxic respiratory failure   # Dysphagia  # CAD s/p CABG   # BioAVR   # Hx chronic Endocarditis ( on lifelong PCN)   # AFib s/p Watchman device ( 6/28)   # HTN  # HLD  # Depression/Anxiety  # New onset Atrial Flutter w. variable blocks( 7/4)     - Pt was transferred out of MICU to [address] (7/3)  - Transferred to [facility] (7/4) under structural heart specialist, Dr. Moose Johnson as discussed for the new onset Atrial Flutter for sanford agent and further management   - EP consult appreciated, Dr. Figueroa  - s/p Digitalization with Digoxin 0.5mg iv followed by 0.25mg iv x2 ( 7/4-7/5), Dig level 1.1 ( 7/6), to continue with Dig 0.25mcg po daily ( 7/6-)   - c/w Diltiazem 30mg po q6hr for rate controlled (rate controlled but still in Aflutter)   - Furosemide iv x1 ( 7/6) per CTS for pulm congestion seen on CXR  - c/w DAPT: ASA 81mg po daily and Clopidogrel 75mg po daily for at least 45days and plan for repeat CT  - HLD: Atorvastatin 20mg po qHS ( held while NPO)   - Pulm f/u appreciated, d/w PCCM fellow   - Collateral obtained from primary Pulm Dr. 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 4.5 Gram(s) IV Intermittent every 8 hours ( 6/30 @ 2200) Day 6, to d/w Pulm re: duration of iv abx and possible transition to oral abx coverage ( patient apparently no longer on lifetime PCN ever since bioAVR at [facility] in 2018 as clarified with Cardiologist Dr. Mitul Gonzalez)   - WBC 7.24K (7/4)-> 9.42K(7/5)-> 11.03K(7/6)  - BCx: NGTD  - O2 via NC 4L, titrate to keep SpO2>90%  - ICS/LABA: budesonide  80 MICROgram(s)/formoterol 4.5 MICROgram(s) Inhaler 2 Puff(s) Inhalation two times a day  ( uses Albuterol 90mcg/inh 2 puffs q4h PRN and Breo Ellipta 100/25 mcg/INH at home)   - STEWART/ROSSY: albuterol/ipratropium for Nebulization 3 milliLiter(s) Nebulizer every 6 hours ( Proair prn at home)   - consider mucolytic: chest PT, suction as needed   - add lozenges for throat pain   - s/p Extubation (7/2)  - SLP eval appreciated( 7/3): keep NPO for dysphagia, strict oral hygiene  -  SLP f/u today ( 7/6)   - c/w NGT feed w. Jevity @ 40ml/hr   - hx Anxiety:        clonazePAM  Tablet 0.5 milliGRAM(s) Oral two times a day       busPIRone 7.5 milliGRAM(s) Oral q HS <User Schedule>       QUEtiapine 25 milliGRAM(s) Oral daily   - Smith d/c'd ( 7/3) but failed TOV with smith cath replacement ( 7/3), passed TOV after smith removal ( 7/6)   - monitor bowel movement   - PT eval please ( OOBTC as tolerated)    N: NPO/NGT feed  GI ppx: protonix 40mg  DVT ppx: holding pharmacologic ppx for now given hemoptysis; SCDs  Code status: FULL CODE    Dispo: medically active as d/w structural heart      PMD: Dr.Helang Philippe Marvin ( [facility]) [phone]  Pulm; Dr. Efren Wise [phone]  [facility] Cardiology: Dr. Mitul Gonzalez/ Dr. Nubia Minor [phone]  [facility] [phone] ( [address] [place] [id number])   Family contact: Joseph Vazquez (son) [phone]      POC as d/w 9 LA team and

## 2023-07-06 NOTE — PROGRESS NOTE ADULT - SUBJECTIVE AND OBJECTIVE BOX
Patient discussed on morning rounds with Dr. Cervantes    OPERATION & DATE: 6/28 NOEMÍ Closure with Watchman Device     SUBJECTIVE ASSESSMENT: Seen sitting bedside in NAD. Dr. Fan present and assisted w/ translation. Reports mild throat pain. Denies active chest pain, SOB.     VITAL SIGNS:  Vital Signs Last 24 Hrs  T(C): 36.4 (06 Jul 2023 17:06), Max: 36.4 (06 Jul 2023 17:06)  T(F): 97.6 (06 Jul 2023 17:06), Max: 97.6 (06 Jul 2023 17:06)  HR: 86 (06 Jul 2023 17:05) (74 - 112)  BP: 104/51 (06 Jul 2023 17:05) (104/51 - 120/58)  BP(mean): 74 (06 Jul 2023 17:05) (74 - 84)  RR: 18 (06 Jul 2023 17:05) (16 - 18)  SpO2: 97% (06 Jul 2023 17:05) (92% - 97%)    Parameters below as of 06 Jul 2023 17:05  Patient On (Oxygen Delivery Method): nasal cannula w/ humidification  O2 Flow (L/min): 4    I&O's Detail    05 Jul 2023 07:01  -  06 Jul 2023 07:00  --------------------------------------------------------  IN:    Enteral Tube Flush: 190 mL    IV PiggyBack: 250 mL    Jevity 1.2: 960 mL  Total IN: 1400 mL    OUT:    Indwelling Catheter - Urethral (mL): 200 mL    Voided (mL): 1150 mL  Total OUT: 1350 mL    Total NET: 50 mL    06 Jul 2023 07:01  -  06 Jul 2023 17:41  --------------------------------------------------------  IN:    Enteral Tube Flush: 100 mL    IV PiggyBack: 125 mL    Jevity 1.2: 400 mL  Total IN: 625 mL    OUT:    Voided (mL): 850 mL  Total OUT: 850 mL    Total NET: -225 mL    CHEST TUBE: n  LOUISE DRAIN: n  EPICARDIAL WIRES: n  STITCHES:n  STAPLES:n  OBREGON: n  CENTRAL LINE:n  MIDLINE/PICC:n  WOUND VAC:n    PHYSICAL EXAM:  General: NAD, sitting comfortably in chair, conversing appropriately  Neurological: alert and oriented, UE and LE strength equal b/l, facial symmetry present  Cardiovascular: RRR, Clear S1 and S2, no murmurs appreciated  Respiratory: mildly diminished in bases bilaterally, no audible wheezing   Gastrointestinal: +BS, soft, NT, ND  Extremities: moving spontaneously, no calf tenderness or edema.  Vascular: warm, well perfused. DP/PT pulses palpable b/l.  Incisions:       LABS:                        10.2   11.03 )-----------( 282      ( 06 Jul 2023 05:30 )             32.0     PT/INR - ( 06 Jul 2023 05:30 )   PT: 13.5 sec;   INR: 1.13          PTT - ( 06 Jul 2023 05:30 )  PTT:32.2 sec  07-05    140  |  104  |  16  ----------------------------<  164<H>  4.1   |  24  |  0.50    Ca    8.5      05 Jul 2023 06:01  Phos  3.7     07-06  Mg     2.0     07-06    TPro  7.0  /  Alb  3.1<L>  /  TBili  0.5  /  DBili  x   /  AST  26  /  ALT  16  /  AlkPhos  91  07-05    Urinalysis Basic - ( 05 Jul 2023 06:01 )    Color: x / Appearance: x / SG: x / pH: x  Gluc: 164 mg/dL / Ketone: x  / Bili: x / Urobili: x   Blood: x / Protein: x / Nitrite: x   Leuk Esterase: x / RBC: x / WBC x   Sq Epi: x / Non Sq Epi: x / Bacteria: x      MEDICATIONS  (STANDING):  albuterol/ipratropium for Nebulization 3 milliLiter(s) Nebulizer every 6 hours  aspirin  chewable 81 milliGRAM(s) Oral every 24 hours  atorvastatin 20 milliGRAM(s) Oral at bedtime  budesonide  80 MICROgram(s)/formoterol 4.5 MICROgram(s) Inhaler 2 Puff(s) Inhalation two times a day  busPIRone 7.5 milliGRAM(s) Oral <User Schedule>  clonazePAM  Tablet 0.5 milliGRAM(s) Oral two times a day  clopidogrel Tablet 75 milliGRAM(s) Oral daily  digoxin     Tablet 250 MICROGram(s) Oral daily  diltiazem    Tablet 30 milliGRAM(s) Oral every 6 hours  pantoprazole   Suspension 40 milliGRAM(s) Oral daily  piperacillin/tazobactam IVPB.. 4.5 Gram(s) IV Intermittent every 8 hours  polyethylene glycol 3350 17 Gram(s) Oral daily  QUEtiapine 25 milliGRAM(s) Oral daily    MEDICATIONS  (PRN):  acetaminophen   Oral Liquid .. 650 milliGRAM(s) Oral every 6 hours PRN Mild Pain (1 - 3)    RADIOLOGY & ADDITIONAL TESTS:  CXR (7/6/2023):    IMPRESSION:  The lungs are stable, as visualized on the previous study.  Right lung with patchy areas of ill-defined increased alveolar opacifications; indeterminate  Left lower lobe focal atelectasis.  Distal aspect of nasogastric tube not visualized on image.  No congestive heart failure.   Patient discussed on morning rounds with Dr. Cervantes    OPERATION & DATE: 6/28 NOEMÍ Closure with Watchman Device     SUBJECTIVE ASSESSMENT: Seen sitting bedside in NAD. Dr. Fan present and assisted w/ translation. Reports mild throat pain. Denies active chest pain, SOB.     VITAL SIGNS:  Vital Signs Last 24 Hrs  T(C): 36.4 (06 Jul 2023 17:06), Max: 36.4 (06 Jul 2023 17:06)  T(F): 97.6 (06 Jul 2023 17:06), Max: 97.6 (06 Jul 2023 17:06)  HR: 86 (06 Jul 2023 17:05) (74 - 112)  BP: 104/51 (06 Jul 2023 17:05) (104/51 - 120/58)  BP(mean): 74 (06 Jul 2023 17:05) (74 - 84)  RR: 18 (06 Jul 2023 17:05) (16 - 18)  SpO2: 97% (06 Jul 2023 17:05) (92% - 97%)    Parameters below as of 06 Jul 2023 17:05  Patient On (Oxygen Delivery Method): nasal cannula w/ humidification  O2 Flow (L/min): 4    I&O's Detail    05 Jul 2023 07:01  -  06 Jul 2023 07:00  --------------------------------------------------------  IN:    Enteral Tube Flush: 190 mL    IV PiggyBack: 250 mL    Jevity 1.2: 960 mL  Total IN: 1400 mL    OUT:    Indwelling Catheter - Urethral (mL): 200 mL    Voided (mL): 1150 mL  Total OUT: 1350 mL    Total NET: 50 mL    06 Jul 2023 07:01  -  06 Jul 2023 17:41  --------------------------------------------------------  IN:    Enteral Tube Flush: 100 mL    IV PiggyBack: 125 mL    Jevity 1.2: 400 mL  Total IN: 625 mL    OUT:    Voided (mL): 850 mL  Total OUT: 850 mL    Total NET: -225 mL    CHEST TUBE: n  LOUISE DRAIN: n  EPICARDIAL WIRES: n  STITCHES:n  STAPLES:n  OBREGON: n  CENTRAL LINE:n  MIDLINE/PICC:n  WOUND VAC:n    PHYSICAL EXAM:  General: NAD, sitting comfortably in chair, conversing appropriately  Neurological: alert and oriented, UE and LE strength equal b/l, facial symmetry present  Cardiovascular: RRR, Clear S1 and S2, no murmurs appreciated  Respiratory: mildly diminished in bases bilaterally, no audible wheezing   Gastrointestinal: +BS, soft, NT, ND  Extremities: moving spontaneously, no calf tenderness or edema.  Vascular: warm, well perfused. DP/PT pulses palpable b/l.  Incisions: BL groin sites C/D/I     LABS:                        10.2   11.03 )-----------( 282      ( 06 Jul 2023 05:30 )             32.0     PT/INR - ( 06 Jul 2023 05:30 )   PT: 13.5 sec;   INR: 1.13          PTT - ( 06 Jul 2023 05:30 )  PTT:32.2 sec  07-05    140  |  104  |  16  ----------------------------<  164<H>  4.1   |  24  |  0.50    Ca    8.5      05 Jul 2023 06:01  Phos  3.7     07-06  Mg     2.0     07-06    TPro  7.0  /  Alb  3.1<L>  /  TBili  0.5  /  DBili  x   /  AST  26  /  ALT  16  /  AlkPhos  91  07-05    Urinalysis Basic - ( 05 Jul 2023 06:01 )    Color: x / Appearance: x / SG: x / pH: x  Gluc: 164 mg/dL / Ketone: x  / Bili: x / Urobili: x   Blood: x / Protein: x / Nitrite: x   Leuk Esterase: x / RBC: x / WBC x   Sq Epi: x / Non Sq Epi: x / Bacteria: x    MEDICATIONS  (STANDING):  albuterol/ipratropium for Nebulization 3 milliLiter(s) Nebulizer every 6 hours  aspirin  chewable 81 milliGRAM(s) Oral every 24 hours  atorvastatin 20 milliGRAM(s) Oral at bedtime  budesonide  80 MICROgram(s)/formoterol 4.5 MICROgram(s) Inhaler 2 Puff(s) Inhalation two times a day  busPIRone 7.5 milliGRAM(s) Oral <User Schedule>  clonazePAM  Tablet 0.5 milliGRAM(s) Oral two times a day  clopidogrel Tablet 75 milliGRAM(s) Oral daily  digoxin     Tablet 250 MICROGram(s) Oral daily  diltiazem    Tablet 30 milliGRAM(s) Oral every 6 hours  pantoprazole   Suspension 40 milliGRAM(s) Oral daily  piperacillin/tazobactam IVPB.. 4.5 Gram(s) IV Intermittent every 8 hours  polyethylene glycol 3350 17 Gram(s) Oral daily  QUEtiapine 25 milliGRAM(s) Oral daily    MEDICATIONS  (PRN):  acetaminophen   Oral Liquid .. 650 milliGRAM(s) Oral every 6 hours PRN Mild Pain (1 - 3)    RADIOLOGY & ADDITIONAL TESTS:  CXR (7/6/2023):    IMPRESSION:  The lungs are stable, as visualized on the previous study.  Right lung with patchy areas of ill-defined increased alveolar opacifications; indeterminate  Left lower lobe focal atelectasis.  Distal aspect of nasogastric tube not visualized on image.  No congestive heart failure.

## 2023-07-06 NOTE — PROGRESS NOTE ADULT - SUBJECTIVE AND OBJECTIVE BOX
Patient is a 72y old  Female who presents with a chief complaint of valvular disease   tachycardiac (04 Jul 2023 15:16)    INTERVAL EVENTS: NAEON, passed TOV after smith removal    SUBJECTIVE:  Patient was seen and examined at bedside. OOBTC,AAOx3, NAD, on NC4L with SpO2 94%, NGT in place. Pt denies heoptysis, reports orthopnea, unable to lay flat at night, sitting up last night w. mild SOB; reports sore throat, otherwise denies CP.   Pt seen w. CTS DAVIDE Morrissey, hx of sulfar allergy from Bactrim with rash, and did not have problem with Lasix given after valve surgery. Pt aware plan for Lasix iv x1 today.    Review of systems: No fever, chills, dizziness, HA, Changes in vision, CP, dyspnea, nausea or vomiting, dysuria, changes in bowel movements, LE edema. Rest of 12 point Review of systems negative unless otherwise documented elsewhere in note.     Diet, NPO with Tube Feed:   Tube Feeding Modality: Nasogastric  Jevity 1.2 Mayank (JEVITY1.2RTH)  Total Volume for 24 Hours (mL): 1206  Total Number of Cans: 6  Continuous  Starting Tube Feed Rate mL per Hour: 67  Until Goal Tube Feed Rate (mL per Hour): 67  Tube Feed Duration (in Hours): 18  Tube Feed Start Time: 01:00  Tube Feed Stop Time: 07:00 (07-05-23 @ 16:12) [Active]      MEDICATIONS:  MEDICATIONS  (STANDING):  albuterol/ipratropium for Nebulization 3 milliLiter(s) Nebulizer every 6 hours  aspirin  chewable 81 milliGRAM(s) Oral every 24 hours  atorvastatin 20 milliGRAM(s) Oral at bedtime  budesonide  80 MICROgram(s)/formoterol 4.5 MICROgram(s) Inhaler 2 Puff(s) Inhalation two times a day  busPIRone 7.5 milliGRAM(s) Oral <User Schedule>  clonazePAM  Tablet 0.5 milliGRAM(s) Oral two times a day  clopidogrel Tablet 75 milliGRAM(s) Oral daily  digoxin     Tablet 250 MICROGram(s) Oral daily  diltiazem    Tablet 30 milliGRAM(s) Oral every 6 hours  pantoprazole   Suspension 40 milliGRAM(s) Oral daily  piperacillin/tazobactam IVPB.. 4.5 Gram(s) IV Intermittent every 8 hours  polyethylene glycol 3350 17 Gram(s) Oral daily  QUEtiapine 25 milliGRAM(s) Oral daily    MEDICATIONS  (PRN):  acetaminophen   Oral Liquid .. 650 milliGRAM(s) Oral every 6 hours PRN Mild Pain (1 - 3)      Allergies    Bactrim (Other)  Shrimp (Unknown)  sulfa drugs (Unknown)  Lobster (Unknown)  unknown (Ototoxicity)    Intolerances        OBJECTIVE:  Vital Signs Last 24 Hrs  T(C): 36 (06 Jul 2023 05:01), Max: 36.1 (05 Jul 2023 14:18)  T(F): 96.8 (06 Jul 2023 05:01), Max: 96.9 (05 Jul 2023 14:18)  HR: 100 (06 Jul 2023 05:11) (72 - 138)  BP: 120/58 (06 Jul 2023 05:11) (101/51 - 120/58)  BP(mean): 83 (06 Jul 2023 05:11) (69 - 86)  RR: 18 (06 Jul 2023 05:11) (16 - 18)  SpO2: 94% (06 Jul 2023 05:11) (92% - 99%)    Parameters below as of 06 Jul 2023 05:11  Patient On (Oxygen Delivery Method): nasal cannula w/ humidification  O2 Flow (L/min): 4    I&O's Summary    05 Jul 2023 07:01  -  06 Jul 2023 07:00  --------------------------------------------------------  IN: 1400 mL / OUT: 1350 mL / NET: 50 mL        PHYSICAL EXAM:  Gen:  woman OOBTC, NAD, in good spirits  HEENT: NCAT, MMM, clear OP  Neck: supple, trachea at midline  CV: RRR, +S1/S2  Pulm: adequate respiratory effort, no increase in work of breathing, bibasilar crackles 1/4  Abd: soft, NTND  Skin: warm and dry,   Ext: WWP, no LE edema  Neuro: AOx3, no gross focal neurological deficits  Psych: affect and behavior appropriate, pleasant at time of interview    LABS:                        10.2   11.03 )-----------( 282      ( 06 Jul 2023 05:30 )             32.0     07-05    140  |  104  |  16  ----------------------------<  164<H>  4.1   |  24  |  0.50    Ca    8.5      05 Jul 2023 06:01  Phos  3.7     07-06  Mg     2.0     07-06    TPro  7.0  /  Alb  3.1<L>  /  TBili  0.5  /  DBili  x   /  AST  26  /  ALT  16  /  AlkPhos  91  07-05    LIVER FUNCTIONS - ( 05 Jul 2023 06:01 )  Alb: 3.1 g/dL / Pro: 7.0 g/dL / ALK PHOS: 91 U/L / ALT: 16 U/L / AST: 26 U/L / GGT: x           PT/INR - ( 06 Jul 2023 05:30 )   PT: 13.5 sec;   INR: 1.13          PTT - ( 06 Jul 2023 05:30 )  PTT:32.2 sec  CAPILLARY BLOOD GLUCOSE      POCT Blood Glucose.: 138 mg/dL (05 Jul 2023 11:21)    Urinalysis Basic - ( 05 Jul 2023 06:01 )    Color: x / Appearance: x / SG: x / pH: x  Gluc: 164 mg/dL / Ketone: x  / Bili: x / Urobili: x   Blood: x / Protein: x / Nitrite: x   Leuk Esterase: x / RBC: x / WBC x   Sq Epi: x / Non Sq Epi: x / Bacteria: x        MICRODATA:      RADIOLOGY/OTHER STUDIES:

## 2023-07-06 NOTE — PROGRESS NOTE ADULT - ASSESSMENT
Plan:    Neurovascular:   Postoperative pain   - PRN: tylenol   Anxiety/Depression   - on seroquel, buspirone and clonazepam     Cardiovascular:   -Hemodynamically stable.   -Monitor: BP, HR, tele    Respiratory:   -Oxygenating well on room air  -Encourage continued use of IS 10x/hr and frequent ambulation  -CXR: RL opacities, LLL atelectasis     GI:  -GI PPX: protonix   -PO Diet  -Bowel Regimen: miralax     Renal / :  -Continue to monitor renal function: BUN/Cr: 16/0.50  -Monitor I/O's daily     Endocrine:    -No hx of DM or thyroid dx  -A1c: ordered   -TSH: ordered     Hematologic:  - no overt s/s of active bleeding   -CBC: H/H-10.2/32.0  -Coagulation Panel.    ID:  -Temperature: afebrile   -CBC: WBC-11.03 (likely reactive)   -Continue to observe for SIRS/Sepsis Syndrome.    Prophylaxis:  -DVT prophylaxis with 5000 SubQ Heparin q8h.  -Continue with SCD's b/l while patient is at rest     Disposition:  Advance diet to soft and bite sized this evening per speech; continue cyclic tube feeds; if able to tolerate will remove NG tube in AM   Given 20mg IV Lasix today w/ good output, transition to 40mg PO lasix tomorrow   On diltiazem and digoxin; EP following; f/up on reccs    Assessment:   73yo Cantonese speaking female with past medical history significant for HTN, HLD, CAD (hx of CABG in 2018), aortic valve disease (hx of bioprosthetic AVR in 2018), hx of AV endocarditis (previously on prophylactic penicillin), AF on warfarin therapy, PVD, and COPD/bronchiectasis. Patient w/ worsening hemoptysis on warfarin therapy and decision made to proceed with surgical intervention of appendage closure. Underwent NOEMÍ closure with Watchman device 6/28/23 with Dr. Cervantes. POD1 noted to have worsening cough and hemoptysis. ENT consulted and patient had bedside scope that revealed no active bleeding source.Pulm consulted and she was transferred to MICU 6/29; intubated and bronch showed RLL bleed. S/p tranexamic acid; repeat bronch 7/1 negative or active bleeding. BAL + pseudomonas, started on Zosyn therapy.  Unable to tolerate PO post extubation and NGT placed per SLP; TF started. 7/4 episodes of rapid AF, 150's. Transferred back to [facility] for management. EP consulted and pt given digoxin load in addition to diltiazem 30mg q6hr with improvement in rates.     Currently patient is seeing sitting bedside in NAD. AF 80's on tele. Dig level 1.1 this AM. Continue Cardizem and Dig 250mcg daily per EP. Speech to re-evaluate for NG tube reccs.      Plan:    Neurovascular:   Postoperative pain   - PRN: tylenol   Anxiety/Depression   - continue home regimen: seroquel, buspirone and clonazepam    Cardiovascular:   - S/p NOEMÍ with Watchman device 6/28/2023  - Hx of AF, episodes of RVR postop (150's)   - EP following: s/p dig load; now on diltiazem 30 mg q6 and digoxin 250 mcg daily (dig level 1.1 this AM)   - on ASA, plavix   HTN  - currently normotensive   Hx of bioAVR/CABG (11/2018 SVG-RCA, [employer] Inspiris 21mm)  Hx of AV endocarditis (no longer on prophylactic penicillin)   -Hemodynamically stable.   -Monitor: BP, HR, tele    Respiratory:  Hemoptysis (present prior to admission; worsening postop), resolved   - ENT consulted and patient had bedside scope that revealed no active bleeding source  - Pulm consulted and she was transferred to MICU 6/29; intubated and bronch showed RLL bleed; S/p tranexamic acid; repeat bronch 7/1 negative or active bleeding  COPD // Bronchiectasis  - on Breo Ellipita and Proair  -Oxygenating well on room air  -Encourage continued use of IS 10x/hr and frequent ambulation  -CXR: RL opacities, LLL atelectasis     GI:  Unable to tolerate PO diet postop extubation   - NGT in place; cyclic TF  - per speech will advance diet to soft and bite sized this evening; if able to tolerate plan to remove NG tube in AM   -GI PPX: protonix   -PO Diet  -Bowel Regimen: miralax     Renal / :  -Continue to monitor renal function: BUN/Cr: 16/0.50  -Monitor I/O's daily     Endocrine:    -No hx of DM or thyroid dx  -A1c: ordered   -TSH: ordered     Hematologic:  -no overt s/s of active bleeding   -CBC: H/H-10.2/32.0  -Coagulation Panel.    ID:  -7/1 sputum culture + pseudomonas, continue zosyn 4.5 q8 hours   -Temperature: afebrile   -CBC: WBC-11.03  -Continue to observe for SIRS/Sepsis Syndrome.    Prophylaxis:  -DVT prophylaxis: held in the setting of hemoptysis   -Continue with SCD's b/l while patient is at rest     Disposition:  Advance diet to soft and bite sized this evening per speech; continue cyclic tube feeds; if able to tolerate will remove NG tube in AM   Given 20mg IV Lasix today w/ good output, transition to 40mg PO lasix tomorrow   Continue diltiazem and digoxin; EP following    Assessment:   71yo Cantonese speaking female with past medical history significant for HTN, HLD, CAD (hx of CABG in 2018), aortic valve disease (hx of bioprosthetic AVR in 2018), hx of AV endocarditis (previously on prophylactic penicillin), AF on warfarin therapy, PVD, and COPD/bronchiectasis. Patient w/ worsening hemoptysis on warfarin therapy and decision made to proceed with surgical intervention of appendage closure. Underwent NOEMÍ closure with Watchman device 6/28/23 with Dr. Cervantes. POD1 noted to have worsening cough and hemoptysis. ENT consulted and patient had bedside scope that revealed no active bleeding source.Pulm consulted and she was transferred to MICU 6/29; intubated and bronch showed RLL bleed. S/p tranexamic acid; repeat bronch 7/1 negative or active bleeding. BAL + pseudomonas, started on Zosyn therapy.  Unable to tolerate PO post extubation and NGT placed per SLP; TF started. 7/4 episodes of rapid AF, 150's. Transferred back to [facility] for management. EP consulted and pt given digoxin load in addition to diltiazem 30mg q6hr with improvement in rates.     Currently patient is seeing sitting bedside in NAD. AF 80's on tele. Dig level 1.1 this AM. Continue Cardizem and Dig 250mcg daily per EP. Speech to re-evaluate for NG tube reccs.      Plan:    Neurovascular:   Postoperative pain   - PRN: tylenol   Anxiety/Depression   - continue home regimen: seroquel, buspirone and clonazepam    Cardiovascular:   - S/p NOEMÍ with Watchman device 6/28/2023  - Hx of AF, episodes of RVR postop (150's)   - EP following: s/p dig load; now on diltiazem 30 mg q6 and digoxin 250 mcg daily (dig level 1.1 this AM)   - on ASA, plavix   - given 20mg IV lasix x 1 this AM; transition to 40mg PO lasix tomorrow   HTN  - currently normotensive   HLD  - continue statin therapy   Hx of bioAVR/CABG (11/2018 SVG-RCA, [employer] Inspiris 21mm)  Hx of AV endocarditis (no longer on prophylactic penicillin)   -Hemodynamically stable.   -Monitor: BP, HR, tele    Respiratory:  Hemoptysis (present prior to admission; worsening postop), resolved   - ENT consulted and patient had bedside scope that revealed no active bleeding source  - Pulm consulted and she was transferred to MICU 6/29; intubated and bronch showed RLL bleed; S/p tranexamic acid; repeat bronch 7/1 negative or active bleeding  COPD // Bronchiectasis  - on Breo Ellipita and Proair  -Oxygenating well on room air  -Encourage continued use of IS 10x/hr and frequent ambulation  -CXR: RL opacities, LLL atelectasis     GI:  Unable to tolerate PO diet postop extubation   - NGT in place; cyclic TF  - per speech will advance diet to soft and bite sized this evening; if able to tolerate plan to remove NG tube in AM   -GI PPX: protonix   -PO Diet  -Bowel Regimen: miralax     Renal / :  -Continue to monitor renal function: BUN/Cr: 16/0.50  -Monitor I/O's daily     Endocrine:    -No hx of DM or thyroid dx  -A1c: ordered   -TSH: ordered     Hematologic:  -no overt s/s of active bleeding   -CBC: H/H-10.2/32.0  -Coagulation Panel.    ID:  -7/1 sputum culture + pseudomonas, continue zosyn 4.5 q8 hours   -Temperature: afebrile   -CBC: WBC-11.03  -Continue to observe for SIRS/Sepsis Syndrome.    Prophylaxis:  -DVT prophylaxis: held in the setting of hemoptysis   -Continue with SCD's b/l while patient is at rest     Disposition:  Advance diet to soft and bite sized this evening per speech; continue cyclic tube feeds; if able to tolerate will remove NG tube in AM   Given 20mg IV Lasix today w/ good output, transition to 40mg PO lasix tomorrow   Continue diltiazem and digoxin; EP following

## 2023-07-07 LAB
ANION GAP SERPL CALC-SCNC: 11 MMOL/L — SIGNIFICANT CHANGE UP (ref 5–17)
BUN SERPL-MCNC: 21 MG/DL — SIGNIFICANT CHANGE UP (ref 7–23)
CALCIUM SERPL-MCNC: 8.2 MG/DL — LOW (ref 8.4–10.5)
CHLORIDE SERPL-SCNC: 99 MMOL/L — SIGNIFICANT CHANGE UP (ref 96–108)
CO2 SERPL-SCNC: 29 MMOL/L — SIGNIFICANT CHANGE UP (ref 22–31)
CREAT SERPL-MCNC: 0.56 MG/DL — SIGNIFICANT CHANGE UP (ref 0.5–1.3)
CULTURE RESULTS: SIGNIFICANT CHANGE UP
EGFR: 96 ML/MIN/1.73M2 — SIGNIFICANT CHANGE UP
GLUCOSE SERPL-MCNC: 162 MG/DL — HIGH (ref 70–99)
HCT VFR BLD CALC: 28.3 % — LOW (ref 34.5–45)
HGB BLD-MCNC: 9.2 G/DL — LOW (ref 11.5–15.5)
MAGNESIUM SERPL-MCNC: 2 MG/DL — SIGNIFICANT CHANGE UP (ref 1.6–2.6)
MCHC RBC-ENTMCNC: 30.4 PG — SIGNIFICANT CHANGE UP (ref 27–34)
MCHC RBC-ENTMCNC: 32.5 GM/DL — SIGNIFICANT CHANGE UP (ref 32–36)
MCV RBC AUTO: 93.4 FL — SIGNIFICANT CHANGE UP (ref 80–100)
NRBC # BLD: 0 /100 WBCS — SIGNIFICANT CHANGE UP (ref 0–0)
PLATELET # BLD AUTO: 285 K/UL — SIGNIFICANT CHANGE UP (ref 150–400)
POTASSIUM SERPL-MCNC: 3.7 MMOL/L — SIGNIFICANT CHANGE UP (ref 3.5–5.3)
POTASSIUM SERPL-SCNC: 3.7 MMOL/L — SIGNIFICANT CHANGE UP (ref 3.5–5.3)
RBC # BLD: 3.03 M/UL — LOW (ref 3.8–5.2)
RBC # FLD: 13.5 % — SIGNIFICANT CHANGE UP (ref 10.3–14.5)
SODIUM SERPL-SCNC: 139 MMOL/L — SIGNIFICANT CHANGE UP (ref 135–145)
SPECIMEN SOURCE: SIGNIFICANT CHANGE UP
WBC # BLD: 9.3 K/UL — SIGNIFICANT CHANGE UP (ref 3.8–10.5)
WBC # FLD AUTO: 9.3 K/UL — SIGNIFICANT CHANGE UP (ref 3.8–10.5)

## 2023-07-07 PROCEDURE — 71045 X-RAY EXAM CHEST 1 VIEW: CPT | Mod: 26

## 2023-07-07 PROCEDURE — 99233 SBSQ HOSP IP/OBS HIGH 50: CPT

## 2023-07-07 PROCEDURE — 99232 SBSQ HOSP IP/OBS MODERATE 35: CPT

## 2023-07-07 RX ORDER — FUROSEMIDE 40 MG
20 TABLET ORAL
Refills: 0 | Status: DISCONTINUED | OUTPATIENT
Start: 2023-07-07 | End: 2023-07-09

## 2023-07-07 RX ORDER — POTASSIUM CHLORIDE 20 MEQ
40 PACKET (EA) ORAL ONCE
Refills: 0 | Status: DISCONTINUED | OUTPATIENT
Start: 2023-07-07 | End: 2023-07-07

## 2023-07-07 RX ORDER — DIGOXIN 250 MCG
125 TABLET ORAL DAILY
Refills: 0 | Status: DISCONTINUED | OUTPATIENT
Start: 2023-07-07 | End: 2023-07-09

## 2023-07-07 RX ORDER — DILTIAZEM HCL 120 MG
120 CAPSULE, EXT RELEASE 24 HR ORAL DAILY
Refills: 0 | Status: DISCONTINUED | OUTPATIENT
Start: 2023-07-07 | End: 2023-07-09

## 2023-07-07 RX ORDER — POTASSIUM CHLORIDE 20 MEQ
40 PACKET (EA) ORAL ONCE
Refills: 0 | Status: COMPLETED | OUTPATIENT
Start: 2023-07-07 | End: 2023-07-07

## 2023-07-07 RX ADMIN — Medication 0.5 MILLIGRAM(S): at 05:51

## 2023-07-07 RX ADMIN — Medication 40 MILLIEQUIVALENT(S): at 13:02

## 2023-07-07 RX ADMIN — Medication 40 MILLIGRAM(S): at 05:51

## 2023-07-07 RX ADMIN — PIPERACILLIN AND TAZOBACTAM 25 GRAM(S): 4; .5 INJECTION, POWDER, LYOPHILIZED, FOR SOLUTION INTRAVENOUS at 05:47

## 2023-07-07 RX ADMIN — BUDESONIDE AND FORMOTEROL FUMARATE DIHYDRATE 2 PUFF(S): 160; 4.5 AEROSOL RESPIRATORY (INHALATION) at 19:23

## 2023-07-07 RX ADMIN — BUDESONIDE AND FORMOTEROL FUMARATE DIHYDRATE 2 PUFF(S): 160; 4.5 AEROSOL RESPIRATORY (INHALATION) at 05:49

## 2023-07-07 RX ADMIN — Medication 20 MILLIGRAM(S): at 17:48

## 2023-07-07 RX ADMIN — PIPERACILLIN AND TAZOBACTAM 25 GRAM(S): 4; .5 INJECTION, POWDER, LYOPHILIZED, FOR SOLUTION INTRAVENOUS at 13:56

## 2023-07-07 RX ADMIN — ATORVASTATIN CALCIUM 20 MILLIGRAM(S): 80 TABLET, FILM COATED ORAL at 21:23

## 2023-07-07 RX ADMIN — Medication 81 MILLIGRAM(S): at 05:51

## 2023-07-07 RX ADMIN — Medication 650 MILLIGRAM(S): at 16:00

## 2023-07-07 RX ADMIN — CLOPIDOGREL BISULFATE 75 MILLIGRAM(S): 75 TABLET, FILM COATED ORAL at 12:47

## 2023-07-07 RX ADMIN — Medication 650 MILLIGRAM(S): at 15:07

## 2023-07-07 RX ADMIN — Medication 3 MILLILITER(S): at 10:07

## 2023-07-07 RX ADMIN — Medication 0.5 MILLIGRAM(S): at 17:44

## 2023-07-07 RX ADMIN — QUETIAPINE FUMARATE 25 MILLIGRAM(S): 200 TABLET, FILM COATED ORAL at 17:44

## 2023-07-07 RX ADMIN — Medication 3 MILLILITER(S): at 15:16

## 2023-07-07 RX ADMIN — Medication 7.5 MILLIGRAM(S): at 05:51

## 2023-07-07 RX ADMIN — Medication 125 MICROGRAM(S): at 12:47

## 2023-07-07 RX ADMIN — PANTOPRAZOLE SODIUM 40 MILLIGRAM(S): 20 TABLET, DELAYED RELEASE ORAL at 12:47

## 2023-07-07 RX ADMIN — Medication 3 MILLILITER(S): at 21:23

## 2023-07-07 NOTE — PROGRESS NOTE ADULT - ASSESSMENT
73yo Cantonese speaking female with past medical history significant for HTN, HLD, CAD (hx of CABG in 2018), aortic valve disease (hx of bioprosthetic AVR in 2018), hx of AV endocarditis (previously on prophylactic penicillin), AF on warfarin therapy, PVD, and COPD/bronchiectasis. Patient w/ worsening hemoptysis on warfarin therapy and decision made to proceed with surgical intervention of appendage closure. Underwent NOEMÍ closure with Watchman device 6/28/23 with Dr. Cervantes. POD1 noted to have worsening cough and hemoptysis. ENT consulted and patient had bedside scope that revealed no active bleeding source.Pulm consulted and she was transferred to MICU 6/29; intubated and bronch showed RLL bleed. S/p tranexamic acid; repeat bronch 7/1 negative or active bleeding. BAL + pseudomonas, started on Zosyn therapy. Unable to tolerate PO post extubation and NGT placed per SLP; TF started. 7/4 episodes of rapid AF, 150's. Transferred back to [facility] for management. EP consulted and pt given digoxin load in addition to diltiazem 30mg q6hr with improvement in rates. Currently patient is seeing sitting bedside in [facility]. AF 80's on tele. Dig level 1.1 this AM. Continue Cardizem and Dig 250mcg daily per EP. 7/7 pending final reccs for NGT, pt tolerating soft diet. Per pulm, will continue to try to wean O2 but pt may need to go home on home O2.     Plan:    Neurovascular:   -Pain well controlled with current regimen. PRN's: tylenol,   -depression/anxiety    -c/w klonapin, seroquel, buspar    Cardiovascular:   -Hemodynamically stable.   -Monitor: BP, HR, tele  -CAD    -c/w ASA/Plavix  -Afib    -c/w digoxin .125Qd    -c/w diltiazem 120 XR  Fluid overload    -c/w lasix 20mg IV BID  -HLD    -c/w lipitor    Respiratory:   -Oxygenating well on room air  -Encourage continued use of IS 10x/hr and frequent ambulation  -PNA    -c/w zosyn 4.5 Q8h    -hypoxia, continue to wean O2    GI:  -GI PPX: protonix  -PO Diet  -Bowel Regimen: miralax    Renal / :  -Continue to monitor renal function: BUN/Cr 21/.56  -Monitor I/O's daily     Endocrine:    -No hx of DM or thyroid dx  -A1c: 5.9  -TSH: 1.8    Hematologic:  -CBC: H/H- 9.2/28.3  -Coagulation Panel.    ID:  -Temperature: 36.8  -CBC: WBC- 9.3  -Continue to observe for SIRS/Sepsis Syndrome.    Prophylaxis:  -DVT prophylaxis with SCDs given hempotysis  -Continue with SCD's b/l while patient is at rest     Disposition:  -Discharge home once patient is medically ready

## 2023-07-07 NOTE — PROGRESS NOTE ADULT - ASSESSMENT
73yo Cantonese-speaking F PMH HTN, HLD, CAD s/p bioAVR/CABG (11/2018 SVG-RCA, Catalan Inspiris 21mm), endocarditis ( no longer on lifetime PCN after bioAVR in 2018), AF on warfarin, AAA, PVD, COPD/ bronchiectasis ( on Breo Ellipita and Proair) ? hx MAC, with worsening cough and hemoptysis on warfarin, s/p watchman device placement ( 6/28), postop Day 1 (6/29)c/b worsening cough with hemoptysis, ENT scope showing no bleeding source, and CTA Chest did not reveal source of bleed, brought to MICU ( 6/29) intubated/paralyzed ( 6/29) for bronch with findings of blood-filled R lung, IR unable to embolize. Pt was given Tranexamic acid nebs, repeat Bronch ( 7/1) showed no active bleeding seen, frail tissues/inflammation, BAL with Pseudomonas and started on Zosyn ( 6/20 @ 2200), clinically improving and extubated on ( 7/2 am). Pt failed SLP eval ( 7/3), keep NPO/NGT feed. Pt deemed medically stable and transferred to [address] ( 7/3), failed TOV with smith cath placement ( 7/3).     # COPD  # Bronchiectasis exacerbation ( Pseudomonas)   # Massive hemoptysis   # Acute hypoxic respiratory failure   # Dysphagia  # CAD s/p CABG   # BioAVR   # Hx chronic Endocarditis ( on lifelong PCN)   # AFib s/p Watchman device ( 6/28)   # HTN  # HLD  # Depression/Anxiety  # New onset Atrial Flutter w. variable blocks( 7/4)     - Pt was transferred out of MICU to [address] (7/3)  - Transferred to [facility] (7/4) under structural heart specialist, Dr. Moose Johnson as discussed for the new onset Atrial Flutter for sanford agent and further management   - EP f/u appreciated, Dr. Figueroa  - s/p Digitalization with Digoxin 0.5mg iv followed by 0.25mg iv x2 ( 7/4-7/5), Dig level 1.1 ( 7/6), to continue with Dig 0.25mcg po daily ( 7/6-)   - c/w Diltiazem 30mg po q6hr for rate controlled (rate controlled but still in Aflutter) , NSR( 7/7), change CCB to long acting Diltiazem CD 120mg po daily ( 7/7-)   - Furosemide 20mg iv x1 (7/6)and increased to 20mg iv q12hr ( 7/7-)  per CTS for pulm congestion seen on CXR  - c/w DAPT: ASA 81mg po daily and Clopidogrel 75mg po daily for at least 45days and plan for repeat CT  - HLD: Atorvastatin 20mg po qHS ( held while NPO)   - Pulm f/u appreciated, d/w PCCM fellow   - Collateral obtained from primary Pulm Dr. 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 4.5 Gram(s) IV Intermittent every 8 hours ( 6/30 @ 2200) Day 7, to d/w Pulm re: duration of iv abx and possible transition to oral abx coverage ( patient apparently no longer on lifetime PCN ever since bioAVR at [facility] in 2018 as clarified with Cardiologist Dr. Mitul Gonzalez)   - WBC 7.24K (7/4)-> 9.42K(7/5)-> 11.03K(7/6)  - BCx: NGTD  - O2 via NC 4L, titrate to keep SpO2>90%  - ICS/LABA: budesonide  80 MICROgram(s)/formoterol 4.5 MICROgram(s) Inhaler 2 Puff(s) Inhalation two times a day  ( uses Albuterol 90mcg/inh 2 puffs q4h PRN and Breo Ellipta 100/25 mcg/INH at home)   - STEWART/ROSSY: albuterol/ipratropium for Nebulization 3 milliLiter(s) Nebulizer every 6 hours ( Proair prn at home)   - consider mucolytic: chest PT, suction as needed   - add lozenges for throat pain   - s/p Extubation (7/2)  - SLP eval appreciated( 7/3)-  SLP f/u today ( 7/7)   - pt tolerating regular diet ( 7/7) , consider d/c NGT and tube feed    - hx Anxiety:        clonazePAM  Tablet 0.5 milliGRAM(s) Oral two times a day       busPIRone 7.5 milliGRAM(s) Oral q HS <User Schedule>       QUEtiapine 25 milliGRAM(s) Oral daily   - Smith d/c'd ( 7/3) but failed TOV with smith cath replacement ( 7/3), passed TOV after smith removal ( 7/6)   - monitor bowel movement   - PT eval please ( OOBTC as tolerated)    GI ppx: protonix 40mg  DVT ppx: holding pharmacologic ppx for now given hemoptysis; SCDs  Code status: FULL CODE    Dispo: medically active as d/w structural heart      PMD: Dr.Helang Philippe Marvin ( [facility]) [phone]  Pulm; Dr. Efren Wise [phone]  [facility] Cardiology: Dr. Mitul Gonzalez/ Dr. Nubia Minor [phone]  [facility] [employer] [phone] ( [address] [place] [address])   Family contact: Joseph Vazquez (son) [phone]      POC as d/w 9 LA team, Evelyn AUGUSTINE

## 2023-07-07 NOTE — PROGRESS NOTE ADULT - SUBJECTIVE AND OBJECTIVE BOX
Patient is a 72y old  Female who presents with a chief complaint of valvular disease   tachycardiac (04 Jul 2023 15:16)    INTERVAL EVENTS: NAEON    SUBJECTIVE:  Encountered time: 8:00am   Patient was seen and examined at bedside. OOBTC, tolerating regular diet with NGT in place. SpO2 96% on NC 4L. NST on monitor. Pt in good spirits, still has cough w. sputum, denies hemoptysis,SOB,CP,etc. Able to lay flat in bed.     Review of systems: No fever, chills, dizziness, HA, Changes in vision, CP, dyspnea, nausea or vomiting, dysuria, changes in bowel movements, LE edema. Rest of 12 point Review of systems negative unless otherwise documented elsewhere in note.         MEDICATIONS:  MEDICATIONS  (STANDING):  albuterol/ipratropium for Nebulization 3 milliLiter(s) Nebulizer every 6 hours  aspirin  chewable 81 milliGRAM(s) Oral every 24 hours  atorvastatin 20 milliGRAM(s) Oral at bedtime  budesonide  80 MICROgram(s)/formoterol 4.5 MICROgram(s) Inhaler 2 Puff(s) Inhalation two times a day  busPIRone 7.5 milliGRAM(s) Oral <User Schedule>  clonazePAM  Tablet 0.5 milliGRAM(s) Oral two times a day  clopidogrel Tablet 75 milliGRAM(s) Oral daily  digoxin     Tablet 125 MICROGram(s) Oral daily  diltiazem    milliGRAM(s) Oral daily  furosemide   Injectable 20 milliGRAM(s) IV Push two times a day  pantoprazole   Suspension 40 milliGRAM(s) Oral daily  piperacillin/tazobactam IVPB.. 4.5 Gram(s) IV Intermittent every 8 hours  polyethylene glycol 3350 17 Gram(s) Oral daily  QUEtiapine 25 milliGRAM(s) Oral daily    MEDICATIONS  (PRN):  acetaminophen   Oral Liquid .. 650 milliGRAM(s) Oral every 6 hours PRN Mild Pain (1 - 3)      Allergies    Bactrim (Other)  Shrimp (Unknown)  sulfa drugs (Unknown)  Lobster (Unknown)  unknown (Ototoxicity)    Intolerances        OBJECTIVE:  Vital Signs Last 24 Hrs  T(C): 36.2 (07 Jul 2023 08:39), Max: 36.8 (07 Jul 2023 05:00)  T(F): 97.1 (07 Jul 2023 08:39), Max: 98.3 (07 Jul 2023 05:00)  HR: 80 (07 Jul 2023 12:55) (75 - 86)  BP: 100/50 (07 Jul 2023 12:55) (96/55 - 121/58)  BP(mean): 72 (07 Jul 2023 12:55) (70 - 84)  RR: 16 (07 Jul 2023 12:55) (16 - 18)  SpO2: 93% (07 Jul 2023 12:55) (93% - 98%)    Parameters below as of 07 Jul 2023 12:55  Patient On (Oxygen Delivery Method): nasal cannula  O2 Flow (L/min): 2    I&O's Summary    06 Jul 2023 07:01  -  07 Jul 2023 07:00  --------------------------------------------------------  IN: 1590 mL / OUT: 1450 mL / NET: 140 mL        PHYSICAL EXAM:  Gen: Reclining in bed at time of exam, appears stated age  HEENT: NCAT, MMM, clear OP  Neck: supple, trachea at midline  CV: RRR, +S1/S2  Pulm: adequate respiratory effort, no increase in work of breathing  Abd: soft, NTND  Skin: warm and dry,   Ext: WWP, no LE edema  Neuro: AOx3, no gross focal neurological deficits  Psych: affect and behavior appropriate, pleasant at time of interview  :     LABS:                        9.2    9.30  )-----------( 285      ( 07 Jul 2023 05:30 )             28.3     07-07    139  |  99  |  21  ----------------------------<  162<H>  3.7   |  29  |  0.56    Ca    8.2<L>      07 Jul 2023 05:30  Phos  3.7     07-06  Mg     2.0     07-07        PT/INR - ( 06 Jul 2023 05:30 )   PT: 13.5 sec;   INR: 1.13          PTT - ( 06 Jul 2023 05:30 )  PTT:32.2 sec  CAPILLARY BLOOD GLUCOSE        Urinalysis Basic - ( 07 Jul 2023 05:30 )    Color: x / Appearance: x / SG: x / pH: x  Gluc: 162 mg/dL / Ketone: x  / Bili: x / Urobili: x   Blood: x / Protein: x / Nitrite: x   Leuk Esterase: x / RBC: x / WBC x   Sq Epi: x / Non Sq Epi: x / Bacteria: x        MICRODATA:      RADIOLOGY/OTHER STUDIES:    PCP  Pharmacy:   Emergency contact:

## 2023-07-07 NOTE — PROGRESS NOTE ADULT - SUBJECTIVE AND OBJECTIVE BOX
Patient discussed on morning rounds with Dr. Cervantes    OPERATION & DATE: 6/28 NOEMÍ occlusion w watchman device    SUBJECTIVE ASSESSMENT: resting comfortably in chair in NAD. Tolerating soft diet well. Reports pain from NGT.     VITAL SIGNS:  Vital Signs Last 24 Hrs  T(C): 36.6 (07 Jul 2023 14:42), Max: 36.8 (07 Jul 2023 05:00)  T(F): 97.9 (07 Jul 2023 14:42), Max: 98.3 (07 Jul 2023 05:00)  HR: 80 (07 Jul 2023 12:55) (75 - 86)  BP: 100/50 (07 Jul 2023 12:55) (96/55 - 121/58)  BP(mean): 72 (07 Jul 2023 12:55) (70 - 84)  RR: 16 (07 Jul 2023 12:55) (16 - 18)  SpO2: 93% (07 Jul 2023 12:55) (93% - 98%)    Parameters below as of 07 Jul 2023 12:55  Patient On (Oxygen Delivery Method): nasal cannula  O2 Flow (L/min): 2    I&O's Detail    06 Jul 2023 07:01  -  07 Jul 2023 07:00  --------------------------------------------------------  IN:    Enteral Tube Flush: 190 mL    IV PiggyBack: 250 mL    Jevity 1.2: 1150 mL  Total IN: 1590 mL    OUT:    Voided (mL): 1450 mL  Total OUT: 1450 mL    Total NET: 140 mL        CHEST TUBE:  none  LOUISE DRAIN:  none  EPICARDIAL WIRES: none  STITCHES: none  STAPLES: none  OBERGON: none  CENTRAL LINE: none  MIDLINE/PICC: none  WOUND VAC: none    PHYSICAL EXAM:  General: resting comfortably in bed in NAD  Neurological: AOx3. Motor skills grossly intact  Cardiovascular: Normal S1/S2. Regular rate/rhythm. No murmurs  Respiratory: Lungs CTA bilaterally. No wheezing or rales  Gastrointestinal: +BS in all 4 quadrants. Non-distended. Soft. Non-tender  Extremities: Strength 5/5 b/l upper/lower extremities. Sensation grossly intact upper/lower extremities. No edema. No calf tenderness.  Vascular: Radial 2+bilaterally, DP 2+ b/l  Incision Sites: NA, groins soft, nontender    LABS:                        9.2    9.30  )-----------( 285      ( 07 Jul 2023 05:30 )             28.3     PT/INR - ( 06 Jul 2023 05:30 )   PT: 13.5 sec;   INR: 1.13          PTT - ( 06 Jul 2023 05:30 )  PTT:32.2 sec  07-07    139  |  99  |  21  ----------------------------<  162<H>  3.7   |  29  |  0.56    Ca    8.2<L>      07 Jul 2023 05:30  Phos  3.7     07-06  Mg     2.0     07-07      Urinalysis Basic - ( 07 Jul 2023 05:30 )    Color: x / Appearance: x / SG: x / pH: x  Gluc: 162 mg/dL / Ketone: x  / Bili: x / Urobili: x   Blood: x / Protein: x / Nitrite: x   Leuk Esterase: x / RBC: x / WBC x   Sq Epi: x / Non Sq Epi: x / Bacteria: x      MEDICATIONS  (STANDING):  albuterol/ipratropium for Nebulization 3 milliLiter(s) Nebulizer every 6 hours  aspirin  chewable 81 milliGRAM(s) Oral every 24 hours  atorvastatin 20 milliGRAM(s) Oral at bedtime  budesonide  80 MICROgram(s)/formoterol 4.5 MICROgram(s) Inhaler 2 Puff(s) Inhalation two times a day  busPIRone 7.5 milliGRAM(s) Oral <User Schedule>  clonazePAM  Tablet 0.5 milliGRAM(s) Oral two times a day  clopidogrel Tablet 75 milliGRAM(s) Oral daily  digoxin     Tablet 125 MICROGram(s) Oral daily  diltiazem    milliGRAM(s) Oral daily  furosemide   Injectable 20 milliGRAM(s) IV Push two times a day  pantoprazole   Suspension 40 milliGRAM(s) Oral daily  polyethylene glycol 3350 17 Gram(s) Oral daily  QUEtiapine 25 milliGRAM(s) Oral daily    MEDICATIONS  (PRN):  acetaminophen   Oral Liquid .. 650 milliGRAM(s) Oral every 6 hours PRN Mild Pain (1 - 3)    RADIOLOGY & ADDITIONAL TESTS:

## 2023-07-08 LAB
A FLAVUS AB FLD QL: NEGATIVE — SIGNIFICANT CHANGE UP
A NIGER AB FLD QL: NEGATIVE — SIGNIFICANT CHANGE UP
A NIGER AB FLD QL: NEGATIVE — SIGNIFICANT CHANGE UP
ANION GAP SERPL CALC-SCNC: 11 MMOL/L — SIGNIFICANT CHANGE UP (ref 5–17)
APTT BLD: 30.8 SEC — SIGNIFICANT CHANGE UP (ref 27.5–35.5)
BUN SERPL-MCNC: 15 MG/DL — SIGNIFICANT CHANGE UP (ref 7–23)
CALCIUM SERPL-MCNC: 8.7 MG/DL — SIGNIFICANT CHANGE UP (ref 8.4–10.5)
CHLORIDE SERPL-SCNC: 97 MMOL/L — SIGNIFICANT CHANGE UP (ref 96–108)
CO2 SERPL-SCNC: 31 MMOL/L — SIGNIFICANT CHANGE UP (ref 22–31)
CREAT SERPL-MCNC: 0.52 MG/DL — SIGNIFICANT CHANGE UP (ref 0.5–1.3)
EGFR: 97 ML/MIN/1.73M2 — SIGNIFICANT CHANGE UP
GLUCOSE SERPL-MCNC: 113 MG/DL — HIGH (ref 70–99)
HCT VFR BLD CALC: 28.3 % — LOW (ref 34.5–45)
HGB BLD-MCNC: 9.1 G/DL — LOW (ref 11.5–15.5)
INR BLD: 1.18 — HIGH (ref 0.88–1.16)
MAGNESIUM SERPL-MCNC: 2 MG/DL — SIGNIFICANT CHANGE UP (ref 1.6–2.6)
MCHC RBC-ENTMCNC: 30 PG — SIGNIFICANT CHANGE UP (ref 27–34)
MCHC RBC-ENTMCNC: 32.2 GM/DL — SIGNIFICANT CHANGE UP (ref 32–36)
MCV RBC AUTO: 93.4 FL — SIGNIFICANT CHANGE UP (ref 80–100)
NRBC # BLD: 0 /100 WBCS — SIGNIFICANT CHANGE UP (ref 0–0)
PLATELET # BLD AUTO: 306 K/UL — SIGNIFICANT CHANGE UP (ref 150–400)
POTASSIUM SERPL-MCNC: 4.1 MMOL/L — SIGNIFICANT CHANGE UP (ref 3.5–5.3)
POTASSIUM SERPL-SCNC: 4.1 MMOL/L — SIGNIFICANT CHANGE UP (ref 3.5–5.3)
PROTHROM AB SERPL-ACNC: 14.1 SEC — HIGH (ref 10.5–13.4)
RBC # BLD: 3.03 M/UL — LOW (ref 3.8–5.2)
RBC # FLD: 13.6 % — SIGNIFICANT CHANGE UP (ref 10.3–14.5)
SODIUM SERPL-SCNC: 139 MMOL/L — SIGNIFICANT CHANGE UP (ref 135–145)
WBC # BLD: 9.54 K/UL — SIGNIFICANT CHANGE UP (ref 3.8–10.5)
WBC # FLD AUTO: 9.54 K/UL — SIGNIFICANT CHANGE UP (ref 3.8–10.5)

## 2023-07-08 PROCEDURE — 99233 SBSQ HOSP IP/OBS HIGH 50: CPT

## 2023-07-08 PROCEDURE — 71045 X-RAY EXAM CHEST 1 VIEW: CPT | Mod: 26

## 2023-07-08 PROCEDURE — 99232 SBSQ HOSP IP/OBS MODERATE 35: CPT

## 2023-07-08 RX ORDER — PIPERACILLIN AND TAZOBACTAM 4; .5 G/20ML; G/20ML
4.5 INJECTION, POWDER, LYOPHILIZED, FOR SOLUTION INTRAVENOUS EVERY 8 HOURS
Refills: 0 | Status: DISCONTINUED | OUTPATIENT
Start: 2023-07-08 | End: 2023-07-12

## 2023-07-08 RX ADMIN — Medication 3 MILLILITER(S): at 06:00

## 2023-07-08 RX ADMIN — Medication 81 MILLIGRAM(S): at 06:03

## 2023-07-08 RX ADMIN — Medication 7.5 MILLIGRAM(S): at 06:01

## 2023-07-08 RX ADMIN — QUETIAPINE FUMARATE 25 MILLIGRAM(S): 200 TABLET, FILM COATED ORAL at 17:38

## 2023-07-08 RX ADMIN — ATORVASTATIN CALCIUM 20 MILLIGRAM(S): 80 TABLET, FILM COATED ORAL at 21:27

## 2023-07-08 RX ADMIN — Medication 3 MILLILITER(S): at 21:27

## 2023-07-08 RX ADMIN — Medication 0.5 MILLIGRAM(S): at 06:03

## 2023-07-08 RX ADMIN — PIPERACILLIN AND TAZOBACTAM 25 GRAM(S): 4; .5 INJECTION, POWDER, LYOPHILIZED, FOR SOLUTION INTRAVENOUS at 16:35

## 2023-07-08 RX ADMIN — Medication 125 MICROGRAM(S): at 06:03

## 2023-07-08 RX ADMIN — PANTOPRAZOLE SODIUM 40 MILLIGRAM(S): 20 TABLET, DELAYED RELEASE ORAL at 11:22

## 2023-07-08 RX ADMIN — PIPERACILLIN AND TAZOBACTAM 25 GRAM(S): 4; .5 INJECTION, POWDER, LYOPHILIZED, FOR SOLUTION INTRAVENOUS at 21:27

## 2023-07-08 RX ADMIN — Medication 20 MILLIGRAM(S): at 17:38

## 2023-07-08 RX ADMIN — POLYETHYLENE GLYCOL 3350 17 GRAM(S): 17 POWDER, FOR SOLUTION ORAL at 11:22

## 2023-07-08 RX ADMIN — BUDESONIDE AND FORMOTEROL FUMARATE DIHYDRATE 2 PUFF(S): 160; 4.5 AEROSOL RESPIRATORY (INHALATION) at 06:01

## 2023-07-08 RX ADMIN — Medication 20 MILLIGRAM(S): at 06:03

## 2023-07-08 RX ADMIN — CLOPIDOGREL BISULFATE 75 MILLIGRAM(S): 75 TABLET, FILM COATED ORAL at 11:22

## 2023-07-08 RX ADMIN — Medication 120 MILLIGRAM(S): at 06:01

## 2023-07-08 RX ADMIN — Medication 0.5 MILLIGRAM(S): at 17:38

## 2023-07-08 NOTE — PROGRESS NOTE ADULT - SUBJECTIVE AND OBJECTIVE BOX
Medicine Progress Note    Patient is a 72y old  Female who presents with a chief complaint of valvular disease   tachycardiac (04 Jul 2023 15:16)      SUBJECTIVE / OVERNIGHT EVENTS: NGT removed yesterday. NAEON. Pt OOBTC, SpO2 96% on NC4L. remains in NSR. reports cough denies hemoptysis, SOB,N/V,etc.         MEDICATIONS  (STANDING):  albuterol/ipratropium for Nebulization 3 milliLiter(s) Nebulizer every 6 hours  aspirin  chewable 81 milliGRAM(s) Oral every 24 hours  atorvastatin 20 milliGRAM(s) Oral at bedtime  budesonide  80 MICROgram(s)/formoterol 4.5 MICROgram(s) Inhaler 2 Puff(s) Inhalation two times a day  busPIRone 7.5 milliGRAM(s) Oral <User Schedule>  clonazePAM  Tablet 0.5 milliGRAM(s) Oral two times a day  clopidogrel Tablet 75 milliGRAM(s) Oral daily  digoxin     Tablet 125 MICROGram(s) Oral daily  diltiazem    milliGRAM(s) Oral daily  furosemide   Injectable 20 milliGRAM(s) IV Push two times a day  pantoprazole   Suspension 40 milliGRAM(s) Oral daily  polyethylene glycol 3350 17 Gram(s) Oral daily  QUEtiapine 25 milliGRAM(s) Oral daily    MEDICATIONS  (PRN):  acetaminophen   Oral Liquid .. 650 milliGRAM(s) Oral every 6 hours PRN Mild Pain (1 - 3)    CAPILLARY BLOOD GLUCOSE        I&O's Summary    07 Jul 2023 07:01  -  08 Jul 2023 07:00  --------------------------------------------------------  IN: 118 mL / OUT: 550 mL / NET: -432 mL        PHYSICAL EXAM:  Vital Signs Last 24 Hrs  T(C): 36.3 (08 Jul 2023 05:01), Max: 36.6 (07 Jul 2023 14:42)  T(F): 97.3 (08 Jul 2023 05:01), Max: 97.9 (07 Jul 2023 14:42)  HR: 84 (08 Jul 2023 04:30) (77 - 86)  BP: 126/58 (08 Jul 2023 04:30) (96/55 - 127/57)  BP(mean): 84 (08 Jul 2023 04:30) (72 - 86)  RR: 18 (08 Jul 2023 04:30) (16 - 18)  SpO2: 94% (08 Jul 2023 04:30) (92% - 95%)    Parameters below as of 08 Jul 2023 04:30  Patient On (Oxygen Delivery Method): room air      CONSTITUTIONAL: NAD, well-developed, well-groomed  ENMT: Moist oral mucosa, no pharyngeal injection or exudates; normal dentition  RESPIRATORY: Normal respiratory effort; lungs are clear to auscultation bilaterally  CARDIOVASCULAR: Regular rate and rhythm, normal S1 and S2, no murmur/rub/gallop; No lower extremity edema; Peripheral pulses are 2+ bilaterally  ABDOMEN: Nontender to palpation, normoactive bowel sounds, no rebound/guarding; No hepatosplenomegaly  PSYCH: A+O to person, place, and time; affect appropriate  NEUROLOGY: CN 2-12 are intact and symmetric; no gross sensory deficits   SKIN: No rashes; no palpable lesions    LABS:                        9.1    9.54  )-----------( 306      ( 08 Jul 2023 05:30 )             28.3     07-08    139  |  97  |  15  ----------------------------<  113<H>  4.1   |  31  |  0.52    Ca    8.7      08 Jul 2023 05:30  Mg     2.0     07-08      PT/INR - ( 08 Jul 2023 05:30 )   PT: 14.1 sec;   INR: 1.18          PTT - ( 08 Jul 2023 05:30 )  PTT:30.8 sec      Urinalysis Basic - ( 08 Jul 2023 05:30 )    Color: x / Appearance: x / SG: x / pH: x  Gluc: 113 mg/dL / Ketone: x  / Bili: x / Urobili: x   Blood: x / Protein: x / Nitrite: x   Leuk Esterase: x / RBC: x / WBC x   Sq Epi: x / Non Sq Epi: x / Bacteria: x            RADIOLOGY & ADDITIONAL TESTS:  Imaging from Last 24 Hours:    Electrocardiogram/QTc Interval:    COORDINATION OF CARE:  Care Discussed with Consultants/Other Providers:

## 2023-07-08 NOTE — PROGRESS NOTE ADULT - SUBJECTIVE AND OBJECTIVE BOX
Patient discussed on morning rounds with Dr. Cervantes    OPERATION & DATE: NOEMÍ occlusion with watchman device    SUBJECTIVE ASSESSMENT: Pt feeling well today, no acute complaints at this time    VITAL SIGNS:  Vital Signs Last 24 Hrs  T(C): 36.4 (08 Jul 2023 10:25), Max: 36.6 (07 Jul 2023 14:42)  T(F): 97.5 (08 Jul 2023 10:25), Max: 97.9 (07 Jul 2023 14:42)  HR: 78 (08 Jul 2023 08:30) (77 - 86)  BP: 98/52 (08 Jul 2023 08:30) (98/52 - 127/57)  BP(mean): 73 (08 Jul 2023 08:30) (72 - 86)  RR: 14 (08 Jul 2023 08:30) (14 - 18)  SpO2: 91% (08 Jul 2023 08:30) (91% - 94%)    Parameters below as of 08 Jul 2023 08:30  Patient On (Oxygen Delivery Method): room air      I&O's Detail    07 Jul 2023 07:01  -  08 Jul 2023 07:00  --------------------------------------------------------  IN:    Oral Fluid: 118 mL  Total IN: 118 mL    OUT:    Voided (mL): 550 mL  Total OUT: 550 mL    Total NET: -432 mL      08 Jul 2023 07:01  -  08 Jul 2023 11:40  --------------------------------------------------------  IN:  Total IN: 0 mL    OUT:    Voided (mL): 200 mL  Total OUT: 200 mL    Total NET: -200 mL        CHEST TUBE:  none  LOUISE DRAIN:  none  EPICARDIAL WIRES: none  STITCHES: none  STAPLES: none  OBREGON: none  CENTRAL LINE: none  MIDLINE/PICC: none  WOUND VAC: none    PHYSICAL EXAM:  General: resting comfortably in bed in chair in NAD  Neurological: AOx3. Motor skills grossly intact  Cardiovascular: Normal S1/S2. Regular rate/rhythm. No murmurs  Respiratory: Lungs CTA bilaterally. No wheezing or rales  Gastrointestinal: +BS in all 4 quadrants. Non-distended. Soft. Non-tender  Extremities: Strength 5/5 b/l upper/lower extremities. Sensation grossly intact upper/lower extremities. No edema. No calf tenderness.  Vascular: Radial 2+bilaterally, DP 2+ b/l  Incision Sites: groins soft      LABS:                        9.1    9.54  )-----------( 306      ( 08 Jul 2023 05:30 )             28.3     PT/INR - ( 08 Jul 2023 05:30 )   PT: 14.1 sec;   INR: 1.18          PTT - ( 08 Jul 2023 05:30 )  PTT:30.8 sec  07-08    139  |  97  |  15  ----------------------------<  113<H>  4.1   |  31  |  0.52    Ca    8.7      08 Jul 2023 05:30  Mg     2.0     07-08      Urinalysis Basic - ( 08 Jul 2023 05:30 )    Color: x / Appearance: x / SG: x / pH: x  Gluc: 113 mg/dL / Ketone: x  / Bili: x / Urobili: x   Blood: x / Protein: x / Nitrite: x   Leuk Esterase: x / RBC: x / WBC x   Sq Epi: x / Non Sq Epi: x / Bacteria: x      MEDICATIONS  (STANDING):  albuterol/ipratropium for Nebulization 3 milliLiter(s) Nebulizer every 6 hours  aspirin  chewable 81 milliGRAM(s) Oral every 24 hours  atorvastatin 20 milliGRAM(s) Oral at bedtime  budesonide  80 MICROgram(s)/formoterol 4.5 MICROgram(s) Inhaler 2 Puff(s) Inhalation two times a day  busPIRone 7.5 milliGRAM(s) Oral <User Schedule>  clonazePAM  Tablet 0.5 milliGRAM(s) Oral two times a day  clopidogrel Tablet 75 milliGRAM(s) Oral daily  digoxin     Tablet 125 MICROGram(s) Oral daily  diltiazem    milliGRAM(s) Oral daily  furosemide   Injectable 20 milliGRAM(s) IV Push two times a day  pantoprazole   Suspension 40 milliGRAM(s) Oral daily  polyethylene glycol 3350 17 Gram(s) Oral daily  QUEtiapine 25 milliGRAM(s) Oral daily    MEDICATIONS  (PRN):  acetaminophen   Oral Liquid .. 650 milliGRAM(s) Oral every 6 hours PRN Mild Pain (1 - 3)    RADIOLOGY & ADDITIONAL TESTS:

## 2023-07-08 NOTE — PROGRESS NOTE ADULT - ASSESSMENT
73yo Cantonese-speaking F PMH HTN, HLD, CAD s/p bioAVR/CABG (11/2018 SVG-RCA, Catalan Inspiris 21mm), endocarditis ( no longer on lifetime PCN after bioAVR in 2018), AF on warfarin, AAA, PVD, COPD/ bronchiectasis ( on Breo Ellipita and Proair) ? hx MAC, with worsening cough and hemoptysis on warfarin, s/p watchman device placement ( 6/28), postop Day 1 (6/29)c/b worsening cough with hemoptysis, ENT scope showing no bleeding source, and CTA Chest did not reveal source of bleed, brought to MICU ( 6/29) intubated/paralyzed ( 6/29) for bronch with findings of blood-filled R lung, IR unable to embolize. Pt was given Tranexamic acid nebs, repeat Bronch ( 7/1) showed no active bleeding seen, frail tissues/inflammation, BAL with Pseudomonas and started on Zosyn ( 6/20 @ 2200), clinically improving and extubated on ( 7/2 am). Pt failed SLP eval ( 7/3), keep NPO/NGT feed. Pt deemed medically stable and transferred to [address] ( 7/3), failed TOV with smith cath placement ( 7/3).     # COPD  # Bronchiectasis exacerbation ( Pseudomonas)   # Massive hemoptysis   # Acute hypoxic respiratory failure   # Dysphagia  # CAD s/p CABG   # BioAVR   # Hx chronic Endocarditis ( on lifelong PCN)   # AFib s/p Watchman device ( 6/28)   # HTN  # HLD  # Depression/Anxiety  # New onset Atrial Flutter w. variable blocks( 7/4)     - Pt was transferred out of MICU to [address] (7/3)  - Transferred to [facility] (7/4) under structural heart specialist, Dr. Moose Johnson as discussed for the new onset Atrial Flutter for sanford agent and further management   - EP f/u appreciated, Dr. Figueroa  - s/p Digitalization with Digoxin 0.5mg iv followed by 0.25mg iv x2 ( 7/4-7/5), Dig level 1.1 ( 7/6), to continue with Dig 0.25mcg po daily ( 7/6-) -> 0.125mcg po daily ( 7/7-)   - c/w Diltiazem 30mg po q6hr for rate controlled (rate controlled but still in Aflutter) , NSR( 7/7), changed CCB to long acting Diltiazem CD 120mg po daily ( 7/7-)   - Furosemide 20mg iv x1 (7/6)and increased to 20mg iv q12hr ( 7/7-), f/u UO/titrate off O2, keep K>4 and Mg>2   - c/w DAPT: ASA 81mg po daily and Clopidogrel 75mg po daily for at least 45days and plan for repeat CT  - HLD: Atorvastatin 20mg po qHS ( held while NPO)   - Pulm f/u appreciated, d/w PCCM fellow   - Collateral obtained from primary Pulm Dr. 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 4.5 Gram(s) IV Intermittent every 8 hours ( 6/30 @ 2200) Day 8, to d/w Pulm re: duration of iv abx and possible transition to oral abx coverage ( patient apparently no longer on lifetime PCN ever [place] [facility] as clarified with Cardiologist Dr. Mitul Gonzalez)   - WBC 7.24K (7/4)-> 9.42K(7/5)-> 11.03K(7/6)  - BCx: NGTD  - O2 via NC 4L, titrate to keep SpO2>90%  - ICS/LABA: budesonide  80 MICROgram(s)/formoterol 4.5 MICROgram(s) Inhaler 2 Puff(s) Inhalation two times a day  ( uses Albuterol 90mcg/inh 2 puffs q4h PRN and Breo Ellipta 100/25 mcg/INH at home)   - STEWART/ORSSY: albuterol/ipratropium for Nebulization 3 milliLiter(s) Nebulizer every 6 hours ( Proair prn at home)   - consider mucolytic: chest PT, suction as needed   - add lozenges for throat pain   - s/p Extubation (7/2)  - SLP eval appreciated, NGT removed ( 7/7)    - pt tolerating regular diet ( 7/7)   - hx Anxiety:        clonazePAM  Tablet 0.5 milliGRAM(s) Oral two times a day       busPIRone 7.5 milliGRAM(s) Oral q HS <User Schedule>       QUEtiapine 25 milliGRAM(s) Oral daily   - Smith d/c'd ( 7/3) but failed TOV with smith cath replacement ( 7/3), passed TOV after smith removal ( 7/6)   - monitor bowel movement   - PT eval please ( OOBTC as tolerated)    GI ppx: protonix 40mg  DVT ppx: holding pharmacologic ppx for now given hemoptysis; SCDs  Code status: FULL CODE    Dispo: medically active as d/w structural heart      PMD: Dr.Helang Philippe Marvin ( [facility]) [phone]  Pulm; Dr. Efren Wise [phone]  Count includes the [facility] Cardiology: Dr. Mitul Gonzalez/ Dr. Nubia Minor [phone]  [facility] [phone] ( [address] [place] [address])   Family contact: Joseph Vazquez (son) [phone]      POC as d/w 9 [place] teamEvelyn

## 2023-07-08 NOTE — PROGRESS NOTE ADULT - ASSESSMENT
73yo Cantonese speaking female with past medical history significant for HTN, HLD, CAD (hx of CABG in 2018), aortic valve disease (hx of bioprosthetic AVR in 2018), hx of AV endocarditis (previously on prophylactic penicillin), AF on warfarin therapy, PVD, and COPD/bronchiectasis. Patient w/ worsening hemoptysis on warfarin therapy and decision made to proceed with surgical intervention of appendage closure. Underwent NOEMÍ closure with Watchman device 6/28/23 with Dr. Cervantes. POD1 noted to have worsening cough and hemoptysis. ENT consulted and patient had bedside scope that revealed no active bleeding source.Pulm consulted and she was transferred to MICU 6/29; intubated and bronch showed RLL bleed. S/p tranexamic acid; repeat bronch 7/1 negative or active bleeding. BAL + pseudomonas, started on Zosyn therapy. Unable to tolerate PO post extubation and NGT placed per SLP; TF started. 7/4 episodes of rapid AF, 150's. Transferred back to [facility] for management. EP consulted and pt given digoxin load in addition to diltiazem 30mg q6hr with improvement in rates. Currently patient is seeing sitting bedside in [facility]. AF 80's on tele. Dig level 1.1 this AM. Continue Cardizem and Dig 250mcg daily per EP. 7/7 pending final reccs for NGT, pt tolerating soft diet. Per pulm, will continue to try to wean O2 but pt may need to go home on home O2.     Plan:    Neurovascular:   -Pain well controlled with current regimen. PRN's: tylenol,   -depression/anxiety    -c/w klonapin, seroquel, buspar    Cardiovascular:   -Hemodynamically stable.   -Monitor: BP, HR, tele  -CAD    -c/w ASA/Plavix  -Afib    -NSR maintained    -c/w digoxin .125Qd    -c/w diltiazem 120 XR  Fluid overload    -c/w lasix 20mg IV BID  -HLD    -c/w lipitor    Respiratory:   -Oxygenating well on room air  -Encourage continued use of IS 10x/hr and frequent ambulation  -PNA    -c/w zosyn 4.5 Q 8h for a total of 14 days (started 6/30)    -if pt is discharged, she can switch to levaquin per pulm    -hypoxia, continue to wean O2, home O2 set up initiated    GI:  -GI PPX: protonix  -PO Diet  -Bowel Regimen: miralax    Renal / :  -Continue to monitor renal function: BUN/Cr 15/.52  -Monitor I/O's daily     Endocrine:    -No hx of DM or thyroid dx  -A1c: 5.9  -TSH: 1.8    Hematologic:  -CBC: H/H- 9.1/28.3  -Coagulation Panel.    ID:  -Temperature: 36.6  -CBC: WBC- 9.5  -Continue to observe for SIRS/Sepsis Syndrome.    Prophylaxis:  -DVT prophylaxis with SCDs given hempotysis  -Continue with SCD's b/l while patient is at rest     Disposition:  -Discharge home once patient is medically ready

## 2023-07-09 LAB
ALBUMIN SERPL ELPH-MCNC: 3.1 G/DL — LOW (ref 3.3–5)
ALP SERPL-CCNC: 94 U/L — SIGNIFICANT CHANGE UP (ref 40–120)
ALT FLD-CCNC: 33 U/L — SIGNIFICANT CHANGE UP (ref 10–45)
ANION GAP SERPL CALC-SCNC: 12 MMOL/L — SIGNIFICANT CHANGE UP (ref 5–17)
ANION GAP SERPL CALC-SCNC: 13 MMOL/L — SIGNIFICANT CHANGE UP (ref 5–17)
ANION GAP SERPL CALC-SCNC: 13 MMOL/L — SIGNIFICANT CHANGE UP (ref 5–17)
APTT BLD: 30.6 SEC — SIGNIFICANT CHANGE UP (ref 27.5–35.5)
AST SERPL-CCNC: 24 U/L — SIGNIFICANT CHANGE UP (ref 10–40)
BILIRUB SERPL-MCNC: 1 MG/DL — SIGNIFICANT CHANGE UP (ref 0.2–1.2)
BLD GP AB SCN SERPL QL: NEGATIVE — SIGNIFICANT CHANGE UP
BUN SERPL-MCNC: 15 MG/DL — SIGNIFICANT CHANGE UP (ref 7–23)
BUN SERPL-MCNC: 17 MG/DL — SIGNIFICANT CHANGE UP (ref 7–23)
BUN SERPL-MCNC: 17 MG/DL — SIGNIFICANT CHANGE UP (ref 7–23)
CALCIUM SERPL-MCNC: 8.5 MG/DL — SIGNIFICANT CHANGE UP (ref 8.4–10.5)
CALCIUM SERPL-MCNC: 9 MG/DL — SIGNIFICANT CHANGE UP (ref 8.4–10.5)
CALCIUM SERPL-MCNC: 9.6 MG/DL — SIGNIFICANT CHANGE UP (ref 8.4–10.5)
CHLORIDE SERPL-SCNC: 100 MMOL/L — SIGNIFICANT CHANGE UP (ref 96–108)
CHLORIDE SERPL-SCNC: 103 MMOL/L — SIGNIFICANT CHANGE UP (ref 96–108)
CHLORIDE SERPL-SCNC: 99 MMOL/L — SIGNIFICANT CHANGE UP (ref 96–108)
CO2 SERPL-SCNC: 27 MMOL/L — SIGNIFICANT CHANGE UP (ref 22–31)
CO2 SERPL-SCNC: 27 MMOL/L — SIGNIFICANT CHANGE UP (ref 22–31)
CO2 SERPL-SCNC: 29 MMOL/L — SIGNIFICANT CHANGE UP (ref 22–31)
CREAT SERPL-MCNC: 0.61 MG/DL — SIGNIFICANT CHANGE UP (ref 0.5–1.3)
CREAT SERPL-MCNC: 0.64 MG/DL — SIGNIFICANT CHANGE UP (ref 0.5–1.3)
CREAT SERPL-MCNC: 0.65 MG/DL — SIGNIFICANT CHANGE UP (ref 0.5–1.3)
EGFR: 92 ML/MIN/1.73M2 — SIGNIFICANT CHANGE UP
EGFR: 93 ML/MIN/1.73M2 — SIGNIFICANT CHANGE UP
EGFR: 94 ML/MIN/1.73M2 — SIGNIFICANT CHANGE UP
GAS PNL BLDA: SIGNIFICANT CHANGE UP
GLUCOSE SERPL-MCNC: 126 MG/DL — HIGH (ref 70–99)
GLUCOSE SERPL-MCNC: 134 MG/DL — HIGH (ref 70–99)
GLUCOSE SERPL-MCNC: 90 MG/DL — SIGNIFICANT CHANGE UP (ref 70–99)
HCT VFR BLD CALC: 30.5 % — LOW (ref 34.5–45)
HCT VFR BLD CALC: 31 % — LOW (ref 34.5–45)
HGB BLD-MCNC: 10 G/DL — LOW (ref 11.5–15.5)
HGB BLD-MCNC: 10 G/DL — LOW (ref 11.5–15.5)
INR BLD: 1.19 — HIGH (ref 0.88–1.16)
LACTATE SERPL-SCNC: 1.1 MMOL/L — SIGNIFICANT CHANGE UP (ref 0.5–2)
MAGNESIUM SERPL-MCNC: 2.1 MG/DL — SIGNIFICANT CHANGE UP (ref 1.6–2.6)
MCHC RBC-ENTMCNC: 29.9 PG — SIGNIFICANT CHANGE UP (ref 27–34)
MCHC RBC-ENTMCNC: 30.4 PG — SIGNIFICANT CHANGE UP (ref 27–34)
MCHC RBC-ENTMCNC: 32.3 GM/DL — SIGNIFICANT CHANGE UP (ref 32–36)
MCHC RBC-ENTMCNC: 32.8 GM/DL — SIGNIFICANT CHANGE UP (ref 32–36)
MCV RBC AUTO: 92.7 FL — SIGNIFICANT CHANGE UP (ref 80–100)
MCV RBC AUTO: 92.8 FL — SIGNIFICANT CHANGE UP (ref 80–100)
NRBC # BLD: 0 /100 WBCS — SIGNIFICANT CHANGE UP (ref 0–0)
NRBC # BLD: 0 /100 WBCS — SIGNIFICANT CHANGE UP (ref 0–0)
PLATELET # BLD AUTO: 364 K/UL — SIGNIFICANT CHANGE UP (ref 150–400)
PLATELET # BLD AUTO: 381 K/UL — SIGNIFICANT CHANGE UP (ref 150–400)
POTASSIUM SERPL-MCNC: 3.5 MMOL/L — SIGNIFICANT CHANGE UP (ref 3.5–5.3)
POTASSIUM SERPL-MCNC: 4 MMOL/L — SIGNIFICANT CHANGE UP (ref 3.5–5.3)
POTASSIUM SERPL-MCNC: SIGNIFICANT CHANGE UP MMOL/L (ref 3.5–5.3)
POTASSIUM SERPL-SCNC: 3.5 MMOL/L — SIGNIFICANT CHANGE UP (ref 3.5–5.3)
POTASSIUM SERPL-SCNC: 4 MMOL/L — SIGNIFICANT CHANGE UP (ref 3.5–5.3)
POTASSIUM SERPL-SCNC: SIGNIFICANT CHANGE UP MMOL/L (ref 3.5–5.3)
PROT SERPL-MCNC: 7 G/DL — SIGNIFICANT CHANGE UP (ref 6–8.3)
PROTHROM AB SERPL-ACNC: 14.2 SEC — HIGH (ref 10.5–13.4)
RBC # BLD: 3.29 M/UL — LOW (ref 3.8–5.2)
RBC # BLD: 3.34 M/UL — LOW (ref 3.8–5.2)
RBC # FLD: 13.4 % — SIGNIFICANT CHANGE UP (ref 10.3–14.5)
RBC # FLD: 13.6 % — SIGNIFICANT CHANGE UP (ref 10.3–14.5)
RH IG SCN BLD-IMP: POSITIVE — SIGNIFICANT CHANGE UP
SODIUM SERPL-SCNC: 138 MMOL/L — SIGNIFICANT CHANGE UP (ref 135–145)
SODIUM SERPL-SCNC: 142 MMOL/L — SIGNIFICANT CHANGE UP (ref 135–145)
SODIUM SERPL-SCNC: 143 MMOL/L — SIGNIFICANT CHANGE UP (ref 135–145)
WBC # BLD: 10.05 K/UL — SIGNIFICANT CHANGE UP (ref 3.8–10.5)
WBC # BLD: 8.04 K/UL — SIGNIFICANT CHANGE UP (ref 3.8–10.5)
WBC # FLD AUTO: 10.05 K/UL — SIGNIFICANT CHANGE UP (ref 3.8–10.5)
WBC # FLD AUTO: 8.04 K/UL — SIGNIFICANT CHANGE UP (ref 3.8–10.5)

## 2023-07-09 PROCEDURE — 99291 CRITICAL CARE FIRST HOUR: CPT | Mod: 25

## 2023-07-09 PROCEDURE — 36556 INSERT NON-TUNNEL CV CATH: CPT | Mod: RT,59

## 2023-07-09 PROCEDURE — 33210 INSERT ELECTRD/PM CATH SNGL: CPT

## 2023-07-09 PROCEDURE — 71045 X-RAY EXAM CHEST 1 VIEW: CPT | Mod: 26,77

## 2023-07-09 PROCEDURE — 99233 SBSQ HOSP IP/OBS HIGH 50: CPT

## 2023-07-09 PROCEDURE — 36620 INSERTION CATHETER ARTERY: CPT

## 2023-07-09 PROCEDURE — 71045 X-RAY EXAM CHEST 1 VIEW: CPT | Mod: 26

## 2023-07-09 PROCEDURE — 76937 US GUIDE VASCULAR ACCESS: CPT | Mod: 26

## 2023-07-09 PROCEDURE — 99292 CRITICAL CARE ADDL 30 MIN: CPT | Mod: 25

## 2023-07-09 RX ORDER — GLUCAGON INJECTION, SOLUTION 0.5 MG/.1ML
1 INJECTION, SOLUTION SUBCUTANEOUS ONCE
Refills: 0 | Status: COMPLETED | OUTPATIENT
Start: 2023-07-09 | End: 2023-07-09

## 2023-07-09 RX ORDER — POTASSIUM CHLORIDE 20 MEQ
20 PACKET (EA) ORAL DAILY
Refills: 0 | Status: DISCONTINUED | OUTPATIENT
Start: 2023-07-10 | End: 2023-07-10

## 2023-07-09 RX ORDER — CALCIUM GLUCONATE 100 MG/ML
2 VIAL (ML) INTRAVENOUS ONCE
Refills: 0 | Status: COMPLETED | OUTPATIENT
Start: 2023-07-09 | End: 2023-07-09

## 2023-07-09 RX ORDER — FUROSEMIDE 40 MG
40 TABLET ORAL DAILY
Refills: 0 | Status: DISCONTINUED | OUTPATIENT
Start: 2023-07-10 | End: 2023-07-12

## 2023-07-09 RX ORDER — SENNA PLUS 8.6 MG/1
2 TABLET ORAL AT BEDTIME
Refills: 0 | Status: DISCONTINUED | OUTPATIENT
Start: 2023-07-09 | End: 2023-07-12

## 2023-07-09 RX ORDER — HYDROCORTISONE 1 %
1 OINTMENT (GRAM) TOPICAL EVERY 12 HOURS
Refills: 0 | Status: DISCONTINUED | OUTPATIENT
Start: 2023-07-09 | End: 2023-07-12

## 2023-07-09 RX ORDER — FENTANYL CITRATE 50 UG/ML
50 INJECTION INTRAVENOUS ONCE
Refills: 0 | Status: DISCONTINUED | OUTPATIENT
Start: 2023-07-09 | End: 2023-07-09

## 2023-07-09 RX ORDER — FUROSEMIDE 40 MG
20 TABLET ORAL ONCE
Refills: 0 | Status: COMPLETED | OUTPATIENT
Start: 2023-07-09 | End: 2023-07-09

## 2023-07-09 RX ORDER — VALACYCLOVIR 500 MG/1
1000 TABLET, FILM COATED ORAL EVERY 12 HOURS
Refills: 0 | Status: DISCONTINUED | OUTPATIENT
Start: 2023-07-09 | End: 2023-07-09

## 2023-07-09 RX ORDER — BENZOCAINE AND MENTHOL 5; 1 G/100ML; G/100ML
1 LIQUID ORAL EVERY 4 HOURS
Refills: 0 | Status: DISCONTINUED | OUTPATIENT
Start: 2023-07-09 | End: 2023-07-12

## 2023-07-09 RX ORDER — VALACYCLOVIR 500 MG/1
1000 TABLET, FILM COATED ORAL EVERY 12 HOURS
Refills: 0 | Status: DISCONTINUED | OUTPATIENT
Start: 2023-07-10 | End: 2023-07-12

## 2023-07-09 RX ORDER — POTASSIUM CHLORIDE 20 MEQ
40 PACKET (EA) ORAL ONCE
Refills: 0 | Status: DISCONTINUED | OUTPATIENT
Start: 2023-07-09 | End: 2023-07-09

## 2023-07-09 RX ORDER — POTASSIUM CHLORIDE 20 MEQ
40 PACKET (EA) ORAL EVERY 4 HOURS
Refills: 0 | Status: COMPLETED | OUTPATIENT
Start: 2023-07-09 | End: 2023-07-09

## 2023-07-09 RX ADMIN — PIPERACILLIN AND TAZOBACTAM 25 GRAM(S): 4; .5 INJECTION, POWDER, LYOPHILIZED, FOR SOLUTION INTRAVENOUS at 22:04

## 2023-07-09 RX ADMIN — BUDESONIDE AND FORMOTEROL FUMARATE DIHYDRATE 2 PUFF(S): 160; 4.5 AEROSOL RESPIRATORY (INHALATION) at 05:30

## 2023-07-09 RX ADMIN — Medication 20 MILLIGRAM(S): at 17:54

## 2023-07-09 RX ADMIN — Medication 125 MICROGRAM(S): at 05:27

## 2023-07-09 RX ADMIN — PIPERACILLIN AND TAZOBACTAM 25 GRAM(S): 4; .5 INJECTION, POWDER, LYOPHILIZED, FOR SOLUTION INTRAVENOUS at 13:53

## 2023-07-09 RX ADMIN — FENTANYL CITRATE 50 MICROGRAM(S): 50 INJECTION INTRAVENOUS at 22:00

## 2023-07-09 RX ADMIN — BENZOCAINE AND MENTHOL 1 LOZENGE: 5; 1 LIQUID ORAL at 16:30

## 2023-07-09 RX ADMIN — Medication 0.5 MILLIGRAM(S): at 05:27

## 2023-07-09 RX ADMIN — BUDESONIDE AND FORMOTEROL FUMARATE DIHYDRATE 2 PUFF(S): 160; 4.5 AEROSOL RESPIRATORY (INHALATION) at 17:55

## 2023-07-09 RX ADMIN — Medication 3 MILLILITER(S): at 17:55

## 2023-07-09 RX ADMIN — Medication 40 MILLIEQUIVALENT(S): at 17:53

## 2023-07-09 RX ADMIN — Medication 1 APPLICATION(S): at 19:18

## 2023-07-09 RX ADMIN — Medication 20 MILLIGRAM(S): at 05:27

## 2023-07-09 RX ADMIN — PANTOPRAZOLE SODIUM 40 MILLIGRAM(S): 20 TABLET, DELAYED RELEASE ORAL at 11:59

## 2023-07-09 RX ADMIN — Medication 3 MILLILITER(S): at 11:59

## 2023-07-09 RX ADMIN — Medication 120 MILLIGRAM(S): at 05:28

## 2023-07-09 RX ADMIN — Medication 81 MILLIGRAM(S): at 05:27

## 2023-07-09 RX ADMIN — QUETIAPINE FUMARATE 25 MILLIGRAM(S): 200 TABLET, FILM COATED ORAL at 17:49

## 2023-07-09 RX ADMIN — GLUCAGON INJECTION, SOLUTION 1 MILLIGRAM(S): 0.5 INJECTION, SOLUTION SUBCUTANEOUS at 18:50

## 2023-07-09 RX ADMIN — ATORVASTATIN CALCIUM 20 MILLIGRAM(S): 80 TABLET, FILM COATED ORAL at 22:04

## 2023-07-09 RX ADMIN — Medication 40 MILLIEQUIVALENT(S): at 14:23

## 2023-07-09 RX ADMIN — PIPERACILLIN AND TAZOBACTAM 25 GRAM(S): 4; .5 INJECTION, POWDER, LYOPHILIZED, FOR SOLUTION INTRAVENOUS at 05:26

## 2023-07-09 RX ADMIN — Medication 3 MILLILITER(S): at 22:04

## 2023-07-09 RX ADMIN — FENTANYL CITRATE 50 MICROGRAM(S): 50 INJECTION INTRAVENOUS at 22:30

## 2023-07-09 RX ADMIN — Medication 7.5 MILLIGRAM(S): at 05:28

## 2023-07-09 RX ADMIN — Medication 3 MILLILITER(S): at 05:29

## 2023-07-09 RX ADMIN — CLOPIDOGREL BISULFATE 75 MILLIGRAM(S): 75 TABLET, FILM COATED ORAL at 12:00

## 2023-07-09 RX ADMIN — Medication 0.5 MILLIGRAM(S): at 17:51

## 2023-07-09 RX ADMIN — VALACYCLOVIR 1000 MILLIGRAM(S): 500 TABLET, FILM COATED ORAL at 14:24

## 2023-07-09 RX ADMIN — Medication 200 GRAM(S): at 18:55

## 2023-07-09 NOTE — PROCEDURE NOTE - NSPROCNAME_GEN_A_CORE
Bronchoscopy
Gastric Intubation/Gastric Lavage
Bronchoscopy
Bronchoscopy
Arterial Puncture/Cannulation
Arterial Puncture/Cannulation
Central Line Insertion

## 2023-07-09 NOTE — PROCEDURE NOTE - PROCEDURE DATE TIME, MLM
30-Jun-2023 07:50
01-Jul-2023 20:40
30-Jun-2023 00:30
29-Jun-2023 17:43
30-Jun-2023 11:01
09-Jul-2023
09-Jul-2023 19:40

## 2023-07-09 NOTE — PROCEDURE NOTE - NSINDICATIONS_GEN_A_CORE
arterial puncture to obtain ABG's
transvenous pacemaker insertion
feeds
arterial puncture to obtain ABG's/critical patient/monitoring purposes

## 2023-07-09 NOTE — PROCEDURE NOTE - ADDITIONAL PROCEDURE DETAILS
Left radial artery puncture preformed for ABG, x 1 attempt, direct pressure applied, hemostasis obtained and no hematoma noted.   +ecchymosis to left wrist noted prior to procedure likely 2/2 prior procedures.
tvp inserted via cordis    good v pacing with tthresholds of 5 mhz    vvi 30 / 10 0.8    distance 35 cms

## 2023-07-09 NOTE — PROCEDURE NOTE - NSINFORMCONSENT_GEN_A_CORE
Benefits, risks, and possible complications of procedure explained to patient/caregiver who verbalized understanding and gave written consent.
Benefits, risks, and possible complications of procedure explained to patient/caregiver who verbalized understanding and gave written consent.
Benefits, risks, and possible complications of procedure explained to patient/caregiver who verbalized understanding and gave verbal consent.
Benefits, risks, and possible complications of procedure explained to patient/caregiver who verbalized understanding and gave verbal consent.
This was an emergent procedure.
This was an emergent procedure.

## 2023-07-09 NOTE — PROGRESS NOTE ADULT - SUBJECTIVE AND OBJECTIVE BOX
CTICU  CRITICAL  CARE  attending     Hand off received 					   Pertinent clinical, laboratory, radiographic, hemodynamic, echocardiographic, respiratory data, microbiologic data and chart were reviewed and analyzed frequently throughout the course of the day and night  Patient seen and examined with CTS/ SH attending at bedside  Pt is a 72y , Female, HEALTH ISSUES - PROBLEM Dx:  Hemoptysis    Bronchiectasis    CAD (coronary artery disease)    Chronic atrial fibrillation    Chronic endocarditis    Anxiety and depression    Prophylactic measure        , FAMILY HISTORY:  PAST MEDICAL & SURGICAL HISTORY:  Atrial fibrillation      History of bronchiectasis      CAD (coronary artery disease)      Hyperlipidemia      HTN (hypertension)      History of peripheral vascular disease      Rheumatic aortic disease      History of endocarditis      History of hemoptysis      Elective surgery  bioAVR/CABG      Elective surgery  SVG-RCA 11/2018        Patient is a 72y old  Female who presents with a chief complaint of valvular disease   tachycardiac (04 Jul 2023 15:16)      14 system review was unremarkable    Vital signs, hemodynamic and respiratory parameters were reviewed from the bedside nursing flowsheet.  ICU Vital Signs Last 24 Hrs  T(C): 36.1 (09 Jul 2023 21:59), Max: 36.3 (09 Jul 2023 01:17)  T(F): 96.9 (09 Jul 2023 21:59), Max: 97.4 (09 Jul 2023 01:17)  HR: 82 (09 Jul 2023 23:00) (43 - 98)  BP: 113/65 (09 Jul 2023 18:30) (97/55 - 127/61)  BP(mean): 84 (09 Jul 2023 18:30) (73 - 88)  ABP: 148/65 (09 Jul 2023 23:00) (140/64 - 158/68)  ABP(mean): 91 (09 Jul 2023 23:00) (88 - 95)  RR: 18 (09 Jul 2023 23:00) (16 - 20)  SpO2: 93% (09 Jul 2023 23:00) (91% - 97%)    O2 Parameters below as of 09 Jul 2023 23:00  Patient On (Oxygen Delivery Method): room air          Adult Advanced Hemodynamics Last 24 Hrs  CVP(mm Hg): --  CVP(cm H2O): --  CO: --  CI: --  PA: --  PA(mean): --  PCWP: --  SVR: --  SVRI: --  PVR: --  PVRI: --, ABG - ( 09 Jul 2023 22:16 )  pH, Arterial: 7.50  pH, Blood: x     /  pCO2: 38    /  pO2: 71    / HCO3: 30    / Base Excess: 6.1   /  SaO2: 96.1                Intake and output was reviewed and the fluid balance was calculated  Daily     Daily   I&O's Summary    08 Jul 2023 07:01  -  09 Jul 2023 07:00  --------------------------------------------------------  IN: 355 mL / OUT: 1600 mL / NET: -1245 mL    09 Jul 2023 07:01  -  10 Jul 2023 00:42  --------------------------------------------------------  IN: 545 mL / OUT: 1325 mL / NET: -780 mL        All lines and drain sites were assessed  Glycemic trend was reviewedCAPILLARY BLOOD GLUCOSE        No acute change in mental status  Auscultation of the chest reveals equal bs  Abdomen is soft  Extremities are warm and well perfused  Wounds appear clean and unremarkable  Antibiotics are periop    labs  CBC Full  -  ( 09 Jul 2023 21:45 )  WBC Count : 10.05 K/uL  RBC Count : 3.29 M/uL  Hemoglobin : 10.0 g/dL  Hematocrit : 30.5 %  Platelet Count - Automated : 381 K/uL  Mean Cell Volume : 92.7 fl  Mean Cell Hemoglobin : 30.4 pg  Mean Cell Hemoglobin Concentration : 32.8 gm/dL  Auto Neutrophil # : x  Auto Lymphocyte # : x  Auto Monocyte # : x  Auto Eosinophil # : x  Auto Basophil # : x  Auto Neutrophil % : x  Auto Lymphocyte % : x  Auto Monocyte % : x  Auto Eosinophil % : x  Auto Basophil % : x    07-09    143  |  103  |  15  ----------------------------<  90  4.0   |  27  |  0.65    Ca    9.6      09 Jul 2023 22:00  Mg     2.1     07-09    TPro  7.0  /  Alb  3.1<L>  /  TBili  1.0  /  DBili  x   /  AST  24  /  ALT  33  /  AlkPhos  94  07-09    PT/INR - ( 09 Jul 2023 21:45 )   PT: 14.2 sec;   INR: 1.19          PTT - ( 09 Jul 2023 21:45 )  PTT:30.6 sec  The current medications were reviewed   MEDICATIONS  (STANDING):  albuterol/ipratropium for Nebulization 3 milliLiter(s) Nebulizer every 6 hours  aspirin  chewable 81 milliGRAM(s) Oral every 24 hours  atorvastatin 20 milliGRAM(s) Oral at bedtime  budesonide  80 MICROgram(s)/formoterol 4.5 MICROgram(s) Inhaler 2 Puff(s) Inhalation two times a day  busPIRone 7.5 milliGRAM(s) Oral <User Schedule>  clonazePAM  Tablet 0.5 milliGRAM(s) Oral two times a day  clopidogrel Tablet 75 milliGRAM(s) Oral daily  furosemide    Tablet 40 milliGRAM(s) Oral daily  hydrocortisone 1% Cream 1 Application(s) Topical every 12 hours  pantoprazole   Suspension 40 milliGRAM(s) Oral daily  piperacillin/tazobactam IVPB.. 4.5 Gram(s) IV Intermittent every 8 hours  polyethylene glycol 3350 17 Gram(s) Oral daily  potassium chloride    Tablet ER 20 milliEquivalent(s) Oral daily  QUEtiapine 25 milliGRAM(s) Oral daily  senna 2 Tablet(s) Oral at bedtime  valACYclovir 1000 milliGRAM(s) Oral every 12 hours    MEDICATIONS  (PRN):  acetaminophen   Oral Liquid .. 650 milliGRAM(s) Oral every 6 hours PRN Mild Pain (1 - 3)  benzocaine/menthol Lozenge 1 Lozenge Oral every 4 hours PRN Sore Throat  bisacodyl 5 milliGRAM(s) Oral every 12 hours PRN Constipation       PROBLEM LIST/ ASSESSMENT:  HEALTH ISSUES - PROBLEM Dx:  Hemoptysis    Bronchiectasis    CAD (coronary artery disease)    Chronic atrial fibrillation    Chronic endocarditis    Anxiety and depression    Prophylactic measure        ,   Patient is a 72y old  Female who presents with a chief complaint of valvular disease   tachycardiac (04 Jul 2023 15:16)     s/p cardiac surgery    expected post - op Hypovolemic shock - > 20% intravascular depletion will replete volume      Toxic metabolic encephalopathy ; sundowning due to anesthesia pain medications    Acidosis evidenced by anion gap and negative base excess    tachy ally syndrome    My plan includes :    will place art line for beat to beat assessment  lace tvp hold cardizem   close hemodynamic, ventilatory and drain monitoring and management per post op routine    Monitor for arrhythmias and monitor parameters for organ perfusion  beta blockade not administered due to hemodynamic instability and bradycardia  monitor neurologic status  Head of the bed should remain elevated to 45 deg .   chest PT and IS will be encouraged  monitor adequacy of oxygenation and ventilation and attempt to wean oxygen  antibiotic regimen will be tailored to the clinical, laboratory and microbiologic data  Nutritional goals will be met using po eventually , ensure adequate caloric intake and montior the same  Stress ulcer and VTE prophylaxis will be achieved    Glycemic control is satisfactory  Electrolytes have been repleted as necessary and wound care has been carried out. Pain control has been achieved.   agressive physical therapy and early mobility and ambulation goals will be met   The family was updated about the course and plan  CRITICAL CARE TIME Upon my evaluation, this patient had a high probability of imminent or life-threatening deterioration due to the above problems which required my direct attention, intervention, and personal management.  I have personally provided 110 minutes of critical care time exclusive of time spent on separately billable procedures. Time included review of laboratory data, radiology results, discussion with consultants, and monitoring for potential decompensation. Interventions were performed as documented abovepersonally provided by me  in evaluation and management, reassessments, review and interpretation of labs and x-rays, ventilator and hemodynamic management, formulating a plan and coordinating care: ___110___ MIN.  Time does not include procedural time.    CTICU ATTENDING     					    Roni Perez MD

## 2023-07-09 NOTE — PROCEDURE NOTE - NSPOSTCAREGUIDE_GEN_A_CORE
Verbal/written post procedure instructions were given to patient/caregiver
Verbal/written post procedure instructions were given to patient/caregiver/Care for catheter as per unit/ICU protocols
Care for catheter as per unit/ICU protocols

## 2023-07-09 NOTE — PROGRESS NOTE ADULT - ASSESSMENT
71yo Cantonese-speaking F PMH HTN, HLD, CAD s/p bioAVR/CABG (11/2018 SVG-RCA, [place] Inspiris 21mm), endocarditis ( no longer on lifetime PCN after bioAVR in 2018), AF on warfarin, AAA, PVD, COPD/ bronchiectasis ( on Breo Ellipita and Proair) ? hx MAC, with worsening cough and hemoptysis on warfarin, s/p watchman device placement ( 6/28), postop Day 1 (6/29)c/b worsening cough with hemoptysis, ENT scope showing no bleeding source, and CTA Chest did not reveal source of bleed, brought to MICU ( 6/29) intubated/paralyzed ( 6/29) for bronch with findings of blood-filled R lung, IR unable to embolize. Pt was given Tranexamic acid nebs, repeat Bronch ( 7/1) showed no active bleeding seen, frail tissues/inflammation, BAL with Pseudomonas and started on Zosyn ( 6/20 @ 2200), clinically improving and extubated on ( 7/2 am). Pt failed SLP eval ( 7/3), keep NPO/NGT feed. Pt deemed medically stable and transferred to [address] ( 7/3), failed TOV with smith cath placement ( 7/3).     # COPD  # Bronchiectasis exacerbation ( Pseudomonas)   # Massive hemoptysis   # Acute hypoxic respiratory failure   # Dysphagia  # CAD s/p CABG   # BioAVR   # Hx chronic Endocarditis ( on lifelong PCN)   # AFib s/p Watchman device ( 6/28)   # HTN  # HLD  # Depression/Anxiety  # New onset Atrial Flutter w. variable blocks( 7/4)   # Oral Herpes( s/p Valtrex 7/9)     - Pt was transferred out of MICU to [address] (7/3)  - Transferred to [facility] (7/4) under structural heart specialist, Dr. Moose Johnson as discussed for the new onset Atrial Flutter for sanford agent and further management   - EP f/u appreciated, Dr. Figueroa  - s/p Digitalization with Digoxin 0.5mg iv followed by 0.25mg iv x2 ( 7/4-7/5), Dig level 1.1 ( 7/6), to continue with Dig 0.25mcg po daily ( 7/6-) -> 125mcg po daily ( 7/7-)   - c/w Diltiazem 30mg po q6hr for rate controlled (rate controlled but still in Aflutter) , NSR( 7/7), changed CCB to long acting Diltiazem CD 120mg po daily ( 7/7-)   - Furosemide 20mg iv x1 (7/6)and increased to 20mg iv q12hr ( 7/7-), f/u UO 3630ml  - now off O2 with SpO2 96% RA, keep K>4 and Mg>2   - c/w DAPT: ASA 81mg po daily and Clopidogrel 75mg po daily for at least 45 days and plan for repeat CT  - HLD: Atorvastatin 20mg po qHS   - Pulm f/u appreciated, d/w PCCM fellow   - Collateral obtained from primary Pulm Dr. 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 4.5 Gram(s) IV Intermittent every 8 hours ( 6/30 @ 2200) Day 9, total course 14 days, plan on transitioning to Levoquin po when ready for d/c   - Oral Herpes; Valtrex 2gm po bid x one day ( 7/9)   - WBC 7.24K (7/4)-> 9.42K(7/5)-> 11.03K(7/6)-> 8.04K( 7/9)   - BCx: NGTD  - ICS/LABA: budesonide  80 MICROgram(s)/formoterol 4.5 MICROgram(s) Inhaler 2 Puff(s) Inhalation two times a day  ( uses Albuterol 90mcg/inh 2 puffs q4h PRN and Breo Ellipta 100/25 mcg/INH at home)   - STEWART/ROSSY: albuterol/ipratropium for Nebulization 3 milliLiter(s) Nebulizer every 6 hours ( Proair prn at home)   - consider mucolytic: chest PT, suction as needed   - add lozenges for throat pain   - s/p Extubation (7/2)  - SLP eval appreciated, NGT removed ( 7/7)    - pt tolerating regular diet ( 7/7)   - hx Anxiety:        clonazePAM  Tablet 0.5 milliGRAM(s) Oral two times a day       busPIRone 7.5 milliGRAM(s) Oral q HS <User Schedule>       QUEtiapine 25 milliGRAM(s) Oral daily   - Smith d/c'd ( 7/3) but failed TOV with smith cath replacement ( 7/3), passed TOV after smith removal ( 7/6)   - monitor bowel movement   - PT eval please ( OOBTC as tolerated)    GI ppx: protonix 40mg  DVT ppx: holding pharmacologic ppx for now given hemoptysis; SCDs  Code status: FULL CODE    Dispo: medically improving, consider change iv lasix to oral, PT eval     PMD: Dr.Helang Philippe Marvin ( [facility]) [phone]  Pulm; Dr. Efren Wise [phone]  [facility]: Dr. Mitul Gonzalez/ Dr. Nubia Minor [phone]  [facility] [employer] [phone] ( [address] [place] [address])   Family contact: Joseph Vazquez (son) [phone]      POC as d/w 9 [place] team

## 2023-07-09 NOTE — PROGRESS NOTE ADULT - SUBJECTIVE AND OBJECTIVE BOX
Patient discussed on morning rounds with Dr. Cervantes     OPERATION & DATE: 6/28/23 -- NOEMÍ occlusion with Watchman Device    SUBJECTIVE ASSESSMENT:  ID-- [id number] Pt is feeling well this morning, states she is feeling a lot less anxious than she did a few days ago and feels like she is getting better. Denies any chest pain, palpitations, orthopnea, dyspnea on exertion, shortness of breath, wheezing, abd pain, nausea, vomiting, constipation, lightheadedness, headaches, fevers, or chills.    VITAL SIGNS:  Vital Signs Last 24 Hrs  T(C): 36.1 (09 Jul 2023 09:37), Max: 36.6 (08 Jul 2023 13:57)  T(F): 96.9 (09 Jul 2023 09:37), Max: 97.8 (08 Jul 2023 13:57)  HR: 77 (09 Jul 2023 09:20) (76 - 83)  BP: 110/51 (09 Jul 2023 09:20) (110/51 - 127/59)  BP(mean): 73 (09 Jul 2023 09:20) (73 - 85)  RR: 16 (09 Jul 2023 09:20) (16 - 18)  SpO2: 92% (09 Jul 2023 09:20) (92% - 95%)    Parameters below as of 09 Jul 2023 09:20  Patient On (Oxygen Delivery Method): room air      I&O's Detail    08 Jul 2023 07:01  -  09 Jul 2023 07:00  --------------------------------------------------------  IN:    IV PiggyBack: 175 mL    Oral Fluid: 180 mL  Total IN: 355 mL    OUT:    Voided (mL): 1600 mL  Total OUT: 1600 mL    Total NET: -1245 mL      09 Jul 2023 07:01  -  09 Jul 2023 09:53  --------------------------------------------------------  IN:    IV PiggyBack: 25 mL    Oral Fluid: 120 mL  Total IN: 145 mL    OUT:    Voided (mL): 0 mL  Total OUT: 0 mL    Total NET: 145 mL    CHEST TUBE:  no  LOUISE DRAIN:   no  EPICARDIAL WIRES:  no  STITCHES: no  STAPLES: no  OBREGON:  no  CENTRAL LINE: no  MIDLINE/PICC: no  WOUND VAC: no     PHYSICAL EXAM:  General: well appearing sitting in chair in NAD   HEENT: normocephalic, atraumatic   Cardio: normal s1/s2, no murmurs   Pulm: lungs CTA b/l  GI: normal BS 4 quadrants, soft non-tender   Extremities: no edema, no calf tenderness   Vascular: normal DP 2+ b/l, radial 2+ b/l  Incisions: groin incision well healed    LABS:                        10.0   8.04  )-----------( 364      ( 09 Jul 2023 05:30 )             31.0     PT/INR - ( 08 Jul 2023 05:30 )   PT: 14.1 sec;   INR: 1.18     PTT - ( 08 Jul 2023 05:30 )  PTT:30.8 sec    07-09    142  |  100  |  17  ----------------------------<  134<H>  See note   |  29  |  0.61    Ca    9.0      09 Jul 2023 05:30  Mg     2.1     07-09      Urinalysis Basic - ( 09 Jul 2023 05:30 )    Color: x / Appearance: x / SG: x / pH: x  Gluc: 134 mg/dL / Ketone: x  / Bili: x / Urobili: x   Blood: x / Protein: x / Nitrite: x   Leuk Esterase: x / RBC: x / WBC x   Sq Epi: x / Non Sq Epi: x / Bacteria: x      MEDICATIONS  (STANDING):  albuterol/ipratropium for Nebulization 3 milliLiter(s) Nebulizer every 6 hours  aspirin  chewable 81 milliGRAM(s) Oral every 24 hours  atorvastatin 20 milliGRAM(s) Oral at bedtime  budesonide  80 MICROgram(s)/formoterol 4.5 MICROgram(s) Inhaler 2 Puff(s) Inhalation two times a day  busPIRone 7.5 milliGRAM(s) Oral <User Schedule>  clonazePAM  Tablet 0.5 milliGRAM(s) Oral two times a day  clopidogrel Tablet 75 milliGRAM(s) Oral daily  digoxin     Tablet 125 MICROGram(s) Oral daily  diltiazem    milliGRAM(s) Oral daily  furosemide   Injectable 20 milliGRAM(s) IV Push two times a day  pantoprazole   Suspension 40 milliGRAM(s) Oral daily  piperacillin/tazobactam IVPB.. 4.5 Gram(s) IV Intermittent every 8 hours  polyethylene glycol 3350 17 Gram(s) Oral daily  QUEtiapine 25 milliGRAM(s) Oral daily  senna 2 Tablet(s) Oral at bedtime    MEDICATIONS  (PRN):  acetaminophen   Oral Liquid .. 650 milliGRAM(s) Oral every 6 hours PRN Mild Pain (1 - 3)  bisacodyl 5 milliGRAM(s) Oral every 12 hours PRN Constipation    RADIOLOGY & ADDITIONAL TESTS:  < from: Xray Chest 1 View- PORTABLE-Routine (Xray Chest 1 View- PORTABLE-Routine in AM.) (07.09.23 @ 05:31) >  IMPRESSION: No interval change lung infiltrates. Possible left effusion  < end of copied text >     Patient discussed on morning rounds with Dr. Cervantes     OPERATION & DATE: 6/28/23 -- NOEMÍ occlusion with Watchman Device    SUBJECTIVE ASSESSMENT:  ID-- [id number] Pt is feeling well this morning, states she is feeling a lot less anxious than she did a few days ago and feels like she is getting better. Denies any chest pain, palpitations, orthopnea, dyspnea on exertion, shortness of breath, wheezing, abd pain, nausea, vomiting, constipation, lightheadedness, headaches, fevers, or chills.    VITAL SIGNS:  Vital Signs Last 24 Hrs  T(C): 36.1 (09 Jul 2023 09:37), Max: 36.6 (08 Jul 2023 13:57)  T(F): 96.9 (09 Jul 2023 09:37), Max: 97.8 (08 Jul 2023 13:57)  HR: 77 (09 Jul 2023 09:20) (76 - 83)  BP: 110/51 (09 Jul 2023 09:20) (110/51 - 127/59)  BP(mean): 73 (09 Jul 2023 09:20) (73 - 85)  RR: 16 (09 Jul 2023 09:20) (16 - 18)  SpO2: 92% (09 Jul 2023 09:20) (92% - 95%)    Parameters below as of 09 Jul 2023 09:20  Patient On (Oxygen Delivery Method): room air      I&O's Detail    08 Jul 2023 07:01  -  09 Jul 2023 07:00  --------------------------------------------------------  IN:    IV PiggyBack: 175 mL    Oral Fluid: 180 mL  Total IN: 355 mL    OUT:    Voided (mL): 1600 mL  Total OUT: 1600 mL    Total NET: -1245 mL      09 Jul 2023 07:01  -  09 Jul 2023 09:53  --------------------------------------------------------  IN:    IV PiggyBack: 25 mL    Oral Fluid: 120 mL  Total IN: 145 mL    OUT:    Voided (mL): 0 mL  Total OUT: 0 mL    Total NET: 145 mL    CHEST TUBE:  no  LOUISE DRAIN:   no  EPICARDIAL WIRES:  no  STITCHES: no  STAPLES: no  OBREGON:  no  CENTRAL LINE: no  MIDLINE/PICC: no  WOUND VAC: no     PHYSICAL EXAM:  General: well appearing sitting in chair in NAD   HEENT: normocephalic, atraumatic   Cardio: normal s1/s2, no murmurs   Pulm: lungs CTA b/l  GI: normal BS 4 quadrants, soft non-tender   Extremities: no edema, no calf tenderness   Vascular: normal DP 2+ b/l, radial 2+ b/l  Incisions: groin incision well healed    LABS:                        10.0   8.04  )-----------( 364      ( 09 Jul 2023 05:30 )             31.0     PT/INR - ( 08 Jul 2023 05:30 )   PT: 14.1 sec;   INR: 1.18     PTT - ( 08 Jul 2023 05:30 )  PTT:30.8 sec    07-09    142  |  100  |  17  ----------------------------<  134<H>  See note   |  29  |  0.61    Ca    9.0      09 Jul 2023 05:30  Mg     2.1     07-09      Urinalysis Basic - ( 09 Jul 2023 05:30 )    Color: x / Appearance: x / SG: x / pH: x  Gluc: 134 mg/dL / Ketone: x  / Bili: x / Urobili: x   Blood: x / Protein: x / Nitrite: x   Leuk Esterase: x / RBC: x / WBC x   Sq Epi: x / Non Sq Epi: x / Bacteria: x      MEDICATIONS  (STANDING):  albuterol/ipratropium for Nebulization 3 milliLiter(s) Nebulizer every 6 hours  aspirin  chewable 81 milliGRAM(s) Oral every 24 hours  atorvastatin 20 milliGRAM(s) Oral at bedtime  budesonide  80 MICROgram(s)/formoterol 4.5 MICROgram(s) Inhaler 2 Puff(s) Inhalation two times a day  busPIRone 7.5 milliGRAM(s) Oral <User Schedule>  clonazePAM  Tablet 0.5 milliGRAM(s) Oral two times a day  clopidogrel Tablet 75 milliGRAM(s) Oral daily  digoxin     Tablet 125 MICROGram(s) Oral daily  diltiazem    milliGRAM(s) Oral daily  furosemide   Injectable 20 milliGRAM(s) IV Push two times a day  pantoprazole   Suspension 40 milliGRAM(s) Oral daily  piperacillin/tazobactam IVPB.. 4.5 Gram(s) IV Intermittent every 8 hours  polyethylene glycol 3350 17 Gram(s) Oral daily  QUEtiapine 25 milliGRAM(s) Oral daily  senna 2 Tablet(s) Oral at bedtime    MEDICATIONS  (PRN):  acetaminophen   Oral Liquid .. 650 milliGRAM(s) Oral every 6 hours PRN Mild Pain (1 - 3)  bisacodyl 5 milliGRAM(s) Oral every 12 hours PRN Constipation    RADIOLOGY & ADDITIONAL TESTS:  < from: Xray Chest 1 View- PORTABLE-Routine (Xray Chest 1 View- PORTABLE-Routine in AM.) (07.09.23 @ 05:31) >  IMPRESSION: No interval change lung infiltrates. Possible left effusion  < end of copied text >

## 2023-07-09 NOTE — PROGRESS NOTE ADULT - ASSESSMENT
71 y/o F, Cantonese speaking, PMHx significant for HTN, HLD, CAD (hx of CABG in 2018), aortic valve disease (hx of bioprosthetic AVR in 2018), hx of AV endocarditis (previously on prophylactic penicillin), AF on warfarin therapy, PVD, and COPD/bronchiectasis. Patient w/ worsening hemoptysis on warfarin therapy and decision made to proceed with surgical intervention of appendage closure. Underwent NOEMÍ closure with Watchman device 6/28/23 with Dr. Cervantes. POD1 noted to have worsening cough and hemoptysis. ENT consulted and patient had bedside scope that revealed no active bleeding source.Pulm consulted and she was transferred to MICU 6/29; intubated and bronch showed RLL bleed. S/p tranexamic acid; repeat bronch 7/1 negative or active bleeding. BAL + pseudomonas, started on Zosyn therapy. Unable to tolerate PO post extubation and NGT placed per SLP; TF started. 7/4 episodes of rapid AF, 150's. Transferred back to [facility] for management. EP consulted and pt given digoxin load in addition to diltiazem 30mg q6hr with improvement in rates. Currently patient is seeing sitting bedside in NAD. AF 80's on tele. Dig level 1.1 this AM. Continue Cardizem and Dig 250mcg daily per EP. 7/7 pending final reccs for NGT, pt tolerating soft diet. Per pulm, will continue to try to wean O2 but pt may need to go home on home O2.  73 y/o F, Cantonese speaking, PMHx significant for HTN, HLD, CAD (hx of CABG in 2018), aortic valve disease (hx of bioprosthetic AVR in 2018), hx of AV endocarditis, AF, PVD, and COPD/bronchiectasis. Pt with worsening hemoptysis on warfarin therapy and decision made to proceed with surgical intervention of appendage closure. Underwent NOEMÍ closure with Watchman device 6/28/23 with Dr. Cervantes. POD1 noted to have worsening cough and hemoptysis. ENT consulted and patient had bedside scope that revealed no active bleeding source. Pulm consulted and pt transferred to MICU 6/29, intubated and bronch showed RLL bleed. S/p tranexamic acid. Repeat bronch 7/1 negative or active bleeding. BAL + pseudomonas, started on Zosyn therapy. Unable to tolerate PO post extubation and NGT placed per SLP, and TF started. 7/4 episodes of rapid AF, 150's. Transferred back to [facility] for management. EP consulted and pt given digoxin load in addition to diltiazem 30mg q6hr with improvement in rates. Continue Cardizem and Dig 250mcg daily per EP. 7/7 pt tolerating soft diet. Continuing Abx for total 14 days (on day 10) for PNA. Will go home with home O2. Pending PT to clear pt for home.     Plan:    Neurovascular:   -No pain  -Mood: continue with quetiapine, buspirone, clonazepam    Cardiovascular:   -hx AF (previously on Coumadin, no longer required)    -continue with dig 0.125mcg, pending dig level for AM, previous normal   -hx CABG/AVR    -continue ASA, Plavix     -statin  -hx of IE, stable at this time   -Hemodynamically stable.   -Monitor: BP, HR, tele    Respiratory:   -hx of duoneb, symbicort   -Oxygenating well on room air  -Encourage continued use of IS 10x/hr and frequent ambulation  -CXR: no acute pathology, stable     GI:  -GI PPX: pantoprazole 40  -PO Diet soft and bite sized, pending S/S evaluation   -Bowel Regimen: senna, miralax    Renal / :  -diuresis: 20IV Lasix BID, switching to 40 PO tomorrow   -Continue to monitor renal function: BUN/Cr 17/0.61  -Monitor I/O's daily     Endocrine:    -No hx of DM or thyroid dx  -A1c: 5.9  -TSH: 1.85    Hematologic:  -CBC: H/H- 10/31  -Coagulation Panel: wnl    ID:  -oral herpes: x2 doses of valacyclovir 1000mg Q12 hours   -Pseudomonas PNA : needs total 14 day course (on day 10) - can transition to Levaquin on discharge   -Temperature: afebrile  -CBC: WBC- 8  -Continue to observe for SIRS/Sepsis Syndrome.    Prophylaxis:  -DVT prophylaxis on hold   -Continue with SCD's b/l while patient is at rest     Disposition:  -Discharge home once patient is medically ready

## 2023-07-09 NOTE — PROGRESS NOTE ADULT - SUBJECTIVE AND OBJECTIVE BOX
Patient is a 72y old  Female who presents with a chief complaint of valvular disease   tachycardiac (04 Jul 2023 15:16)    INTERVAL EVENTS: NAEON     SUBJECTIVE:  Patient was seen and examined at bedside. OOBTC in good spirits, SpO2 96% RA. denies cough, SOB,CP, hemoptysis,etc.     Review of systems: No fever, chills, dizziness, HA, Changes in vision, CP, dyspnea, nausea or vomiting, dysuria, changes in bowel movements, LE edema. Rest of 12 point Review of systems negative unless otherwise documented elsewhere in note.         MEDICATIONS:  MEDICATIONS  (STANDING):  albuterol/ipratropium for Nebulization 3 milliLiter(s) Nebulizer every 6 hours  aspirin  chewable 81 milliGRAM(s) Oral every 24 hours  atorvastatin 20 milliGRAM(s) Oral at bedtime  budesonide  80 MICROgram(s)/formoterol 4.5 MICROgram(s) Inhaler 2 Puff(s) Inhalation two times a day  busPIRone 7.5 milliGRAM(s) Oral <User Schedule>  clonazePAM  Tablet 0.5 milliGRAM(s) Oral two times a day  clopidogrel Tablet 75 milliGRAM(s) Oral daily  digoxin     Tablet 125 MICROGram(s) Oral daily  diltiazem    milliGRAM(s) Oral daily  furosemide   Injectable 20 milliGRAM(s) IV Push once  pantoprazole   Suspension 40 milliGRAM(s) Oral daily  piperacillin/tazobactam IVPB.. 4.5 Gram(s) IV Intermittent every 8 hours  polyethylene glycol 3350 17 Gram(s) Oral daily  QUEtiapine 25 milliGRAM(s) Oral daily  senna 2 Tablet(s) Oral at bedtime  valACYclovir 1000 milliGRAM(s) Oral every 12 hours    MEDICATIONS  (PRN):  acetaminophen   Oral Liquid .. 650 milliGRAM(s) Oral every 6 hours PRN Mild Pain (1 - 3)  bisacodyl 5 milliGRAM(s) Oral every 12 hours PRN Constipation      Allergies    Bactrim (Other)  Shrimp (Unknown)  sulfa drugs (Unknown)  Lobster (Unknown)  unknown (Ototoxicity)    Intolerances        OBJECTIVE:  Vital Signs Last 24 Hrs  T(C): 36.1 (09 Jul 2023 09:37), Max: 36.6 (08 Jul 2023 13:57)  T(F): 96.9 (09 Jul 2023 09:37), Max: 97.8 (08 Jul 2023 13:57)  HR: 77 (09 Jul 2023 09:20) (76 - 83)  BP: 110/51 (09 Jul 2023 09:20) (110/51 - 127/59)  BP(mean): 73 (09 Jul 2023 09:20) (73 - 85)  RR: 16 (09 Jul 2023 09:20) (16 - 18)  SpO2: 92% (09 Jul 2023 09:20) (92% - 95%)    Parameters below as of 09 Jul 2023 09:20  Patient On (Oxygen Delivery Method): room air      I&O's Summary    08 Jul 2023 07:01  -  09 Jul 2023 07:00  --------------------------------------------------------  IN: 355 mL / OUT: 1600 mL / NET: -1245 mL    09 Jul 2023 07:01  -  09 Jul 2023 11:52  --------------------------------------------------------  IN: 145 mL / OUT: 200 mL / NET: -55 mL        PHYSICAL EXAM:  Gen: Reclining in bed at time of exam, appears stated age  HEENT: NCAT, MMM, clear OP. L lower lip lesion c/w cold sore ( oral herpes)   Neck: supple, trachea at midline  CV: RRR, +S1/S2  Pulm: adequate respiratory effort, no increase in work of breathing. minimal crackles lung base   Abd: soft, NTND  Skin: warm and dry,   Ext: WWP, no LE edema  Neuro: AOx3, no gross focal neurological deficits  Psych: affect and behavior appropriate, pleasant at time of interview    LABS:                        10.0   8.04  )-----------( 364      ( 09 Jul 2023 05:30 )             31.0     07-09    142  |  100  |  17  ----------------------------<  134<H>  See note   |  29  |  0.61    Ca    9.0      09 Jul 2023 05:30  Mg     2.1     07-09        PT/INR - ( 08 Jul 2023 05:30 )   PT: 14.1 sec;   INR: 1.18          PTT - ( 08 Jul 2023 05:30 )  PTT:30.8 sec  CAPILLARY BLOOD GLUCOSE        Urinalysis Basic - ( 09 Jul 2023 05:30 )    Color: x / Appearance: x / SG: x / pH: x  Gluc: 134 mg/dL / Ketone: x  / Bili: x / Urobili: x   Blood: x / Protein: x / Nitrite: x   Leuk Esterase: x / RBC: x / WBC x   Sq Epi: x / Non Sq Epi: x / Bacteria: x        MICRODATA:      RADIOLOGY/OTHER STUDIES:

## 2023-07-09 NOTE — PROCEDURE NOTE - NSPROCDETAILS_GEN_ALL_CORE
location identified, draped/prepped, sterile technique used, needle inserted/introduced/positive blood return obtained via catheter/connected to a pressurized flush line/sutured in place/hemostasis with direct pressure, dressing applied/Seldinger technique/all materials/supplies accounted for at end of procedure
nasogastric/audible air bolus/placement confirmed by auscultation/bowel sounds present to 4 quadrants
location identified, draped/prepped, sterile technique used, needle inserted/introduced/all materials/supplies accounted for at end of procedure
ultrasound guidance with use of sterile gel and probe cove

## 2023-07-10 LAB
ALBUMIN SERPL ELPH-MCNC: 3 G/DL — LOW (ref 3.3–5)
ALP SERPL-CCNC: 92 U/L — SIGNIFICANT CHANGE UP (ref 40–120)
ALT FLD-CCNC: 31 U/L — SIGNIFICANT CHANGE UP (ref 10–45)
ANION GAP SERPL CALC-SCNC: 13 MMOL/L — SIGNIFICANT CHANGE UP (ref 5–17)
ANION GAP SERPL CALC-SCNC: 13 MMOL/L — SIGNIFICANT CHANGE UP (ref 5–17)
APTT BLD: 30.4 SEC — SIGNIFICANT CHANGE UP (ref 27.5–35.5)
AST SERPL-CCNC: 22 U/L — SIGNIFICANT CHANGE UP (ref 10–40)
BILIRUB SERPL-MCNC: 1.1 MG/DL — SIGNIFICANT CHANGE UP (ref 0.2–1.2)
BUN SERPL-MCNC: 13 MG/DL — SIGNIFICANT CHANGE UP (ref 7–23)
BUN SERPL-MCNC: 14 MG/DL — SIGNIFICANT CHANGE UP (ref 7–23)
CALCIUM SERPL-MCNC: 8.9 MG/DL — SIGNIFICANT CHANGE UP (ref 8.4–10.5)
CALCIUM SERPL-MCNC: 9.3 MG/DL — SIGNIFICANT CHANGE UP (ref 8.4–10.5)
CHLORIDE SERPL-SCNC: 101 MMOL/L — SIGNIFICANT CHANGE UP (ref 96–108)
CHLORIDE SERPL-SCNC: 101 MMOL/L — SIGNIFICANT CHANGE UP (ref 96–108)
CO2 SERPL-SCNC: 25 MMOL/L — SIGNIFICANT CHANGE UP (ref 22–31)
CO2 SERPL-SCNC: 26 MMOL/L — SIGNIFICANT CHANGE UP (ref 22–31)
CREAT SERPL-MCNC: 0.64 MG/DL — SIGNIFICANT CHANGE UP (ref 0.5–1.3)
CREAT SERPL-MCNC: 0.69 MG/DL — SIGNIFICANT CHANGE UP (ref 0.5–1.3)
DIGOXIN SERPL-MCNC: 0.8 NG/ML — SIGNIFICANT CHANGE UP (ref 0.8–2)
EGFR: 91 ML/MIN/1.73M2 — SIGNIFICANT CHANGE UP
EGFR: 93 ML/MIN/1.73M2 — SIGNIFICANT CHANGE UP
GAS PNL BLDA: SIGNIFICANT CHANGE UP
GLUCOSE SERPL-MCNC: 120 MG/DL — HIGH (ref 70–99)
GLUCOSE SERPL-MCNC: 126 MG/DL — HIGH (ref 70–99)
HCT VFR BLD CALC: 31.1 % — LOW (ref 34.5–45)
HCT VFR BLD CALC: 33 % — LOW (ref 34.5–45)
HGB BLD-MCNC: 10.2 G/DL — LOW (ref 11.5–15.5)
HGB BLD-MCNC: 10.5 G/DL — LOW (ref 11.5–15.5)
INR BLD: 1.17 — HIGH (ref 0.88–1.16)
MAGNESIUM SERPL-MCNC: 2.1 MG/DL — SIGNIFICANT CHANGE UP (ref 1.6–2.6)
MAGNESIUM SERPL-MCNC: 2.2 MG/DL — SIGNIFICANT CHANGE UP (ref 1.6–2.6)
MCHC RBC-ENTMCNC: 30 PG — SIGNIFICANT CHANGE UP (ref 27–34)
MCHC RBC-ENTMCNC: 30.4 PG — SIGNIFICANT CHANGE UP (ref 27–34)
MCHC RBC-ENTMCNC: 31.8 GM/DL — LOW (ref 32–36)
MCHC RBC-ENTMCNC: 32.8 GM/DL — SIGNIFICANT CHANGE UP (ref 32–36)
MCV RBC AUTO: 92.6 FL — SIGNIFICANT CHANGE UP (ref 80–100)
MCV RBC AUTO: 94.3 FL — SIGNIFICANT CHANGE UP (ref 80–100)
NRBC # BLD: 0 /100 WBCS — SIGNIFICANT CHANGE UP (ref 0–0)
NRBC # BLD: 0 /100 WBCS — SIGNIFICANT CHANGE UP (ref 0–0)
PHOSPHATE SERPL-MCNC: 3.7 MG/DL — SIGNIFICANT CHANGE UP (ref 2.5–4.5)
PLATELET # BLD AUTO: 375 K/UL — SIGNIFICANT CHANGE UP (ref 150–400)
PLATELET # BLD AUTO: 398 K/UL — SIGNIFICANT CHANGE UP (ref 150–400)
POTASSIUM SERPL-MCNC: 4.1 MMOL/L — SIGNIFICANT CHANGE UP (ref 3.5–5.3)
POTASSIUM SERPL-MCNC: 4.3 MMOL/L — SIGNIFICANT CHANGE UP (ref 3.5–5.3)
POTASSIUM SERPL-SCNC: 4.1 MMOL/L — SIGNIFICANT CHANGE UP (ref 3.5–5.3)
POTASSIUM SERPL-SCNC: 4.3 MMOL/L — SIGNIFICANT CHANGE UP (ref 3.5–5.3)
PROT SERPL-MCNC: 7.1 G/DL — SIGNIFICANT CHANGE UP (ref 6–8.3)
PROTHROM AB SERPL-ACNC: 14 SEC — HIGH (ref 10.5–13.4)
RBC # BLD: 3.36 M/UL — LOW (ref 3.8–5.2)
RBC # BLD: 3.5 M/UL — LOW (ref 3.8–5.2)
RBC # FLD: 13.5 % — SIGNIFICANT CHANGE UP (ref 10.3–14.5)
RBC # FLD: 13.9 % — SIGNIFICANT CHANGE UP (ref 10.3–14.5)
SODIUM SERPL-SCNC: 139 MMOL/L — SIGNIFICANT CHANGE UP (ref 135–145)
SODIUM SERPL-SCNC: 140 MMOL/L — SIGNIFICANT CHANGE UP (ref 135–145)
WBC # BLD: 7.55 K/UL — SIGNIFICANT CHANGE UP (ref 3.8–10.5)
WBC # BLD: 8.3 K/UL — SIGNIFICANT CHANGE UP (ref 3.8–10.5)
WBC # FLD AUTO: 7.55 K/UL — SIGNIFICANT CHANGE UP (ref 3.8–10.5)
WBC # FLD AUTO: 8.3 K/UL — SIGNIFICANT CHANGE UP (ref 3.8–10.5)

## 2023-07-10 PROCEDURE — 71045 X-RAY EXAM CHEST 1 VIEW: CPT | Mod: 26

## 2023-07-10 PROCEDURE — 99233 SBSQ HOSP IP/OBS HIGH 50: CPT

## 2023-07-10 PROCEDURE — 99232 SBSQ HOSP IP/OBS MODERATE 35: CPT

## 2023-07-10 RX ORDER — CLONAZEPAM 1 MG
0.5 TABLET ORAL
Refills: 0 | Status: DISCONTINUED | OUTPATIENT
Start: 2023-07-10 | End: 2023-07-12

## 2023-07-10 RX ORDER — POTASSIUM CHLORIDE 20 MEQ
20 PACKET (EA) ORAL DAILY
Refills: 0 | Status: DISCONTINUED | OUTPATIENT
Start: 2023-07-10 | End: 2023-07-12

## 2023-07-10 RX ADMIN — Medication 7.5 MILLIGRAM(S): at 06:13

## 2023-07-10 RX ADMIN — PIPERACILLIN AND TAZOBACTAM 25 GRAM(S): 4; .5 INJECTION, POWDER, LYOPHILIZED, FOR SOLUTION INTRAVENOUS at 13:54

## 2023-07-10 RX ADMIN — PIPERACILLIN AND TAZOBACTAM 25 GRAM(S): 4; .5 INJECTION, POWDER, LYOPHILIZED, FOR SOLUTION INTRAVENOUS at 21:59

## 2023-07-10 RX ADMIN — QUETIAPINE FUMARATE 25 MILLIGRAM(S): 200 TABLET, FILM COATED ORAL at 17:46

## 2023-07-10 RX ADMIN — Medication 40 MILLIGRAM(S): at 06:37

## 2023-07-10 RX ADMIN — Medication 0.5 MILLIGRAM(S): at 06:13

## 2023-07-10 RX ADMIN — Medication 3 MILLILITER(S): at 09:44

## 2023-07-10 RX ADMIN — Medication 0.5 MILLIGRAM(S): at 17:45

## 2023-07-10 RX ADMIN — VALACYCLOVIR 1000 MILLIGRAM(S): 500 TABLET, FILM COATED ORAL at 01:50

## 2023-07-10 RX ADMIN — Medication 1 APPLICATION(S): at 06:12

## 2023-07-10 RX ADMIN — ATORVASTATIN CALCIUM 20 MILLIGRAM(S): 80 TABLET, FILM COATED ORAL at 22:00

## 2023-07-10 RX ADMIN — BUDESONIDE AND FORMOTEROL FUMARATE DIHYDRATE 2 PUFF(S): 160; 4.5 AEROSOL RESPIRATORY (INHALATION) at 17:46

## 2023-07-10 RX ADMIN — VALACYCLOVIR 1000 MILLIGRAM(S): 500 TABLET, FILM COATED ORAL at 13:54

## 2023-07-10 RX ADMIN — Medication 3 MILLILITER(S): at 06:12

## 2023-07-10 RX ADMIN — PANTOPRAZOLE SODIUM 40 MILLIGRAM(S): 20 TABLET, DELAYED RELEASE ORAL at 11:49

## 2023-07-10 RX ADMIN — POLYETHYLENE GLYCOL 3350 17 GRAM(S): 17 POWDER, FOR SOLUTION ORAL at 11:49

## 2023-07-10 RX ADMIN — Medication 3 MILLILITER(S): at 22:02

## 2023-07-10 RX ADMIN — Medication 3 MILLILITER(S): at 16:55

## 2023-07-10 RX ADMIN — Medication 20 MILLIEQUIVALENT(S): at 13:42

## 2023-07-10 RX ADMIN — Medication 1 APPLICATION(S): at 17:55

## 2023-07-10 RX ADMIN — BUDESONIDE AND FORMOTEROL FUMARATE DIHYDRATE 2 PUFF(S): 160; 4.5 AEROSOL RESPIRATORY (INHALATION) at 06:12

## 2023-07-10 RX ADMIN — PIPERACILLIN AND TAZOBACTAM 25 GRAM(S): 4; .5 INJECTION, POWDER, LYOPHILIZED, FOR SOLUTION INTRAVENOUS at 06:13

## 2023-07-10 RX ADMIN — CLOPIDOGREL BISULFATE 75 MILLIGRAM(S): 75 TABLET, FILM COATED ORAL at 11:49

## 2023-07-10 NOTE — PROGRESS NOTE ADULT - SUBJECTIVE AND OBJECTIVE BOX
EPS Progress Note    S:     EP reconsulted due to episode of bradycardia. on 7/9 around 638pm patient went from AF in the 140s and then converted to junctional rhythm in the 40s. Then patient was having junctional rhythm with PACs. Team concerned for heart block so TVP placed. Pt recovered sinus node conduction ~3 hours after the initial event.     O: T(C): 35.9 (07-10-23 @ 13:59), Max: 36.3 (07-10-23 @ 05:01)  HR: 87 (07-10-23 @ 17:00) (43 - 102)  BP: 116/60 (07-10-23 @ 17:00) (113/65 - 116/60)  RR: 16 (07-10-23 @ 17:00) (16 - 18)  SpO2: 94% (07-10-23 @ 17:00) (91% - 97%)    PHYSICAL  General:  NAD        Chest:  CTA B/L, no w/r/r  Cardiac:  RRR, + s1/s2 , no m/g/r  Abdomen:   soft ND/NT  Extremities: No edema, b/l groin no hematoma/bleeding/oozing  Skin: no rash noted, normal color and pigmentation  Psych: A&Ox3, normal affect and mood  Neuro: no deficit noted     LABS:                        10.5   7.55  )-----------( 375      ( 10 Jul 2023 05:30 )             33.0     07-10    139  |  101  |  13  ----------------------------<  126<H>  4.3   |  25  |  0.64    Ca    8.9      10 Jul 2023 05:30  Phos  3.7     07-10  Mg     2.2     07-10    TPro  7.1  /  Alb  3.0<L>  /  TBili  1.1  /  DBili  x   /  AST  22  /  ALT  31  /  AlkPhos  92  07-10    PT/INR - ( 10 Jul 2023 01:35 )   PT: 14.0 sec;   INR: 1.17          PTT - ( 10 Jul 2023 01:35 )  PTT:30.4 sec      MEDICATIONS:  acetaminophen   Oral Liquid .. 650 milliGRAM(s) Oral every 6 hours PRN  albuterol/ipratropium for Nebulization 3 milliLiter(s) Nebulizer every 6 hours  aspirin  chewable 81 milliGRAM(s) Oral every 24 hours  atorvastatin 20 milliGRAM(s) Oral at bedtime  benzocaine/menthol Lozenge 1 Lozenge Oral every 4 hours PRN  bisacodyl 5 milliGRAM(s) Oral every 12 hours PRN  budesonide  80 MICROgram(s)/formoterol 4.5 MICROgram(s) Inhaler 2 Puff(s) Inhalation two times a day  busPIRone 7.5 milliGRAM(s) Oral <User Schedule>  clonazePAM  Tablet 0.5 milliGRAM(s) Oral two times a day  clopidogrel Tablet 75 milliGRAM(s) Oral daily  furosemide    Tablet 40 milliGRAM(s) Oral daily  hydrocortisone 1% Cream 1 Application(s) Topical every 12 hours  pantoprazole   Suspension 40 milliGRAM(s) Oral daily  piperacillin/tazobactam IVPB.. 4.5 Gram(s) IV Intermittent every 8 hours  polyethylene glycol 3350 17 Gram(s) Oral daily  potassium chloride   Powder 20 milliEquivalent(s) Oral daily  QUEtiapine 25 milliGRAM(s) Oral daily  senna 2 Tablet(s) Oral at bedtime  valACYclovir 1000 milliGRAM(s) Oral every 12 hours      ASSESSMENT/PLAN  73yo Cantonese-speaking F with a PMHx of HTN, HLD, CAD s/p bioprosthetic AVR and CABG (11/2018 SVG-RCA, AVR Catalan 21mm), endocarditis (on lifelong penicillin), AF (Warfarin), AAA, PVD, bronchiectasis, presented w/ hemoptysis of unclear etiology, currently on zosyn with course complicated by episode of rapid afib/aflutter., at that time was started on dig and diltiazem, now with conversion to junctional escape and eventual sinus node recovert. Pt was on high doses of AVNBA. Digoxin level was wnl    -remove TVP  -stop digoxin  -continue diltiazem  -will watch nocturnal heart rates/ rhythm overnight, may consider initiation of low dose amiodarone if patient does not have significant baseline bradycardia.   -d/w Dr Sawyer

## 2023-07-10 NOTE — CHART NOTE - NSCHARTNOTESELECT_GEN_ALL_CORE
Nutrition Services
MTB Pre Bronch/Event Note
Nutrition Services
Structural Heart Chart Update/Off Service Note

## 2023-07-10 NOTE — PROGRESS NOTE ADULT - ASSESSMENT
SOCIAL:  : center  Support at home:  Yes  Smoke exposure: none    Tuberculosis Risk Assessment:    - Has a family member or contact had tuberculosis disease?  No  - Has a family member had a positive tuberculin skin test result?  No  - Was your child born in a high risk country?  No  - Has your child traveled (had contact with a resident population) to a high-risk country for more than a week?  No  - Date of child's most recent PPD and results:  NA    Vision/ Hearing:    - Do you have any concerns about the way your child is seeing or hearing?  no       73yo Cantonese-speaking F PMH HTN, HLD, CAD s/p bioAVR/CABG (11/2018 SVG-RCA, Catalan Inspiris 21mm), endocarditis ( no longer on lifetime PCN after bioAVR in 2018), AF on warfarin, AAA, PVD, COPD/ bronchiectasis ( on Breo Ellipita and Proair) ? hx MAC, with worsening cough and hemoptysis on warfarin, s/p watchman device placement ( 6/28), postop Day 1 (6/29)c/b worsening cough with hemoptysis, ENT scope showing no bleeding source, and CTA Chest did not reveal source of bleed, brought to MICU ( 6/29) intubated/paralyzed ( 6/29) for bronch with findings of blood-filled R lung, IR unable to embolize. Pt was given Tranexamic acid nebs, repeat Bronch ( 7/1) showed no active bleeding seen, frail tissues/inflammation, BAL with Pseudomonas and started on Zosyn ( 6/20 @ 2200), clinically improving and extubated on ( 7/2 am). Pt failed SLP eval ( 7/3), keep NPO/NGT feed. Pt deemed medically stable and transferred to [address] ( 7/3), failed TOV with smith cath placement ( 7/3).     # COPD  # Bronchiectasis exacerbation ( Pseudomonas)   # Massive hemoptysis   # Acute hypoxic respiratory failure   # Dysphagia  # CAD s/p CABG   # BioAVR   # Hx chronic Endocarditis ( on lifelong PCN)   # AFib s/p Watchman device ( 6/28)   # HTN  # HLD  # Depression/Anxiety  # New onset Atrial Flutter w. variable blocks( 7/4)   # Oral Herpes( s/p Valtrex 7/9)     - Pt was transferred out of MICU to [address] (7/3)  - Transferred to [facility] (7/4) under structural heart specialist, Dr. Moose Johnson as discussed for the new onset Atrial Flutter for sanford agent and further management, s/p cardiazem 30mg q6hr(7/6)-> 120mg daily (7/7-7/9), and s/p Digitalization 0.5mg followed by 0.25mg x2 ( 7/4-7/5) then 0.25mg (7/6)-> 0.125mg(7/7-7/9)  - now off both Cardizem CD 120mg and Dig 0.125mg since 7/9 for high grade AV block  - s/p TVP( 7/9)  - EP f/u appreciated, Dr. Figueroa, EP to evaluate for possible PPM   - Furosemide 20mg iv x1 (7/6)and increased to 20mg iv q12hr ( 7/7-7/9)->Lasix 40mg po daily( 7/10-)  - now off O2 with SpO2 96% RA, keep K>4 and Mg>2   - c/w DAPT: ASA 81mg po daily and Clopidogrel 75mg po daily for at least 45 days and plan for repeat CT  - HLD: Atorvastatin 20mg po qHS   - Pulm f/u appreciated, d/w PCCM fellow   - Collateral obtained from primary Pulm Dr. 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 4.5 Gram(s) IV Intermittent every 8 hours ( 6/30 @ 2200) Day 10, total course 14 days, plan on transitioning to Levoquin po when ready for d/c   - Oral Herpes: Valtrex 2gm po bid x one day ( 7/9)   - WBC 7.24K (7/4)-> 9.42K(7/5)-> 11.03K(7/6)-> 8.04K( 7/9)-> 7.55K(7/10)  - BCx: NGTD  - ICS/LABA: budesonide  80 MICROgram(s)/formoterol 4.5 MICROgram(s) Inhaler 2 Puff(s) Inhalation two times a day  ( uses Albuterol 90mcg/inh 2 puffs q4h PRN and Breo Ellipta 100/25 mcg/INH at home)   - STEWART/ROSSY: albuterol/ipratropium for Nebulization 3 milliLiter(s) Nebulizer every 6 hours ( Proair prn at home)   - consider mucolytic: chest PT, suction as needed   - add lozenges for throat pain   - s/p Extubation (7/2)  - SLP eval appreciated, NGT removed ( 7/7)    - pt tolerating regular diet ( 7/7)   - hx Anxiety:        clonazePAM  Tablet 0.5 milliGRAM(s) Oral two times a day       busPIRone 7.5 milliGRAM(s) Oral q HS <User Schedule>       QUEtiapine 25 milliGRAM(s) Oral daily   - Smith d/c'd ( 7/3) but failed TOV with smith cath replacement ( 7/3), passed TOV after smith removal ( 7/6)   - monitor bowel movement   - PT eval please ( OOBTC as tolerated)    GI ppx: protonix 40mg  DVT ppx: holding pharmacologic ppx for now given hemoptysis; SCDs  Code status: FULL CODE    Dispo: medically active s/p TVP(7/9) awaiting EP eval for possible PPM, PT eval please given prolonged hospitalization that she may need ANTHONY    PMD: Dr.Helang Philippe Marvin ( [facility]) [phone]  Pulm; Dr. Efren Wise [phone]  [facility]: Dr. Mitul Gonzalez/ Dr. Nubia Minor [phone]  [facility] [place] [phone] ( [address] [place] [address])   Family contact: Joseph Vazquez (son) [phone]    Dr. Breanne Hay covering 7/11-7/19/23     POC as d/w 9 LA team

## 2023-07-10 NOTE — PROGRESS NOTE ADULT - SUBJECTIVE AND OBJECTIVE BOX
Patient discussed on morning rounds with Dr. Cervantes    OPERATION & DATE: NOEMÍ occlusion with watchman device 6/28    SUBJECTIVE ASSESSMENT: Pt reporting sensation of bladder fullness but no other symptoms at this time.     VITAL SIGNS:  Vital Signs Last 24 Hrs  T(C): 36.3 (10 Jul 2023 05:01), Max: 36.3 (10 Jul 2023 05:01)  T(F): 97.3 (10 Jul 2023 05:01), Max: 97.3 (10 Jul 2023 05:01)  HR: 74 (10 Jul 2023 08:00) (43 - 98)  BP: 113/65 (09 Jul 2023 18:30) (97/55 - 127/61)  BP(mean): 84 (09 Jul 2023 18:30) (73 - 88)  RR: 18 (10 Jul 2023 08:00) (16 - 20)  SpO2: 93% (10 Jul 2023 08:00) (91% - 97%)    Parameters below as of 10 Jul 2023 08:00  Patient On (Oxygen Delivery Method): room air      I&O's Detail    09 Jul 2023 07:01  -  10 Jul 2023 07:00  --------------------------------------------------------  IN:    IV PiggyBack: 125 mL    IV PiggyBack: 100 mL    Oral Fluid: 320 mL  Total IN: 545 mL    OUT:    Voided (mL): 1725 mL  Total OUT: 1725 mL    Total NET: -1180 mL        CHEST TUBE:  none  LOUISE DRAIN:  none  EPICARDIAL WIRES: none  STITCHES: none  STAPLES: none  OBREGON: none  CENTRAL LINE: RIJ TVP in place  MIDLINE/PICC: none  WOUND VAC: none    PHYSICAL EXAM:  General: resting comfortably in bed in NAD  Neurological: AOx3. Motor skills grossly intact  Cardiovascular: Normal S1/S2. Regular rate/rhythm. No murmurs  Respiratory: Lungs CTA bilaterally. No wheezing or rales  Gastrointestinal: +BS in all 4 quadrants. Non-distended. Soft. Non-tender  Extremities: Strength 5/5 b/l upper/lower extremities. Sensation grossly intact upper/lower extremities. No edema. No calf tenderness.  Vascular: Radial 2+bilaterally, DP 2+ b/l  Incision Sites: NA, groins soft      LABS:                        10.5   7.55  )-----------( 375      ( 10 Jul 2023 05:30 )             33.0     PT/INR - ( 10 Jul 2023 01:35 )   PT: 14.0 sec;   INR: 1.17          PTT - ( 10 Jul 2023 01:35 )  PTT:30.4 sec  07-10    139  |  101  |  13  ----------------------------<  126<H>  4.3   |  25  |  0.64    Ca    8.9      10 Jul 2023 05:30  Phos  3.7     07-10  Mg     2.2     07-10    TPro  7.1  /  Alb  3.0<L>  /  TBili  1.1  /  DBili  x   /  AST  22  /  ALT  31  /  AlkPhos  92  07-10    Urinalysis Basic - ( 10 Jul 2023 05:30 )    Color: x / Appearance: x / SG: x / pH: x  Gluc: 126 mg/dL / Ketone: x  / Bili: x / Urobili: x   Blood: x / Protein: x / Nitrite: x   Leuk Esterase: x / RBC: x / WBC x   Sq Epi: x / Non Sq Epi: x / Bacteria: x      MEDICATIONS  (STANDING):  albuterol/ipratropium for Nebulization 3 milliLiter(s) Nebulizer every 6 hours  aspirin  chewable 81 milliGRAM(s) Oral every 24 hours  atorvastatin 20 milliGRAM(s) Oral at bedtime  budesonide  80 MICROgram(s)/formoterol 4.5 MICROgram(s) Inhaler 2 Puff(s) Inhalation two times a day  busPIRone 7.5 milliGRAM(s) Oral <User Schedule>  clonazePAM  Tablet 0.5 milliGRAM(s) Oral two times a day  clopidogrel Tablet 75 milliGRAM(s) Oral daily  furosemide    Tablet 40 milliGRAM(s) Oral daily  hydrocortisone 1% Cream 1 Application(s) Topical every 12 hours  pantoprazole   Suspension 40 milliGRAM(s) Oral daily  piperacillin/tazobactam IVPB.. 4.5 Gram(s) IV Intermittent every 8 hours  polyethylene glycol 3350 17 Gram(s) Oral daily  potassium chloride    Tablet ER 20 milliEquivalent(s) Oral daily  QUEtiapine 25 milliGRAM(s) Oral daily  senna 2 Tablet(s) Oral at bedtime  valACYclovir 1000 milliGRAM(s) Oral every 12 hours    MEDICATIONS  (PRN):  acetaminophen   Oral Liquid .. 650 milliGRAM(s) Oral every 6 hours PRN Mild Pain (1 - 3)  benzocaine/menthol Lozenge 1 Lozenge Oral every 4 hours PRN Sore Throat  bisacodyl 5 milliGRAM(s) Oral every 12 hours PRN Constipation    RADIOLOGY & ADDITIONAL TESTS:

## 2023-07-10 NOTE — PROGRESS NOTE ADULT - SUBJECTIVE AND OBJECTIVE BOX
Patient is a 72y old  Female who presents with a chief complaint of valvular disease   tachycardiac (04 Jul 2023 15:16)    INTERVAL EVENTS: Event noted, s/p TVP for transient high grade AV block with HR ~ 40's     SUBJECTIVE:  Patient was seen and examined at bedside. in NSR, SpO2 95% RA. denies cough, SOB, CP, palpitation,etc.     Review of systems: No fever, chills, dizziness, HA, Changes in vision, CP, dyspnea, nausea or vomiting, dysuria, changes in bowel movements, LE edema. Rest of 12 point Review of systems negative unless otherwise documented elsewhere in note.     Diet, NPO after Midnight:      NPO Start Date: 09-Jul-2023,   NPO Start Time: 23:59 (07-09-23 @ 20:41) [Active]      MEDICATIONS:  MEDICATIONS  (STANDING):  albuterol/ipratropium for Nebulization 3 milliLiter(s) Nebulizer every 6 hours  aspirin  chewable 81 milliGRAM(s) Oral every 24 hours  atorvastatin 20 milliGRAM(s) Oral at bedtime  budesonide  80 MICROgram(s)/formoterol 4.5 MICROgram(s) Inhaler 2 Puff(s) Inhalation two times a day  busPIRone 7.5 milliGRAM(s) Oral <User Schedule>  clonazePAM  Tablet 0.5 milliGRAM(s) Oral two times a day  clopidogrel Tablet 75 milliGRAM(s) Oral daily  furosemide    Tablet 40 milliGRAM(s) Oral daily  hydrocortisone 1% Cream 1 Application(s) Topical every 12 hours  pantoprazole   Suspension 40 milliGRAM(s) Oral daily  piperacillin/tazobactam IVPB.. 4.5 Gram(s) IV Intermittent every 8 hours  polyethylene glycol 3350 17 Gram(s) Oral daily  potassium chloride   Powder 20 milliEquivalent(s) Oral daily  QUEtiapine 25 milliGRAM(s) Oral daily  senna 2 Tablet(s) Oral at bedtime  valACYclovir 1000 milliGRAM(s) Oral every 12 hours    MEDICATIONS  (PRN):  acetaminophen   Oral Liquid .. 650 milliGRAM(s) Oral every 6 hours PRN Mild Pain (1 - 3)  benzocaine/menthol Lozenge 1 Lozenge Oral every 4 hours PRN Sore Throat  bisacodyl 5 milliGRAM(s) Oral every 12 hours PRN Constipation      Allergies    Bactrim (Other)  Shrimp (Unknown)  sulfa drugs (Unknown)  Lobster (Unknown)  unknown (Ototoxicity)    Intolerances        OBJECTIVE:  Vital Signs Last 24 Hrs  T(C): 36.1 (10 Jul 2023 09:42), Max: 36.3 (10 Jul 2023 05:01)  T(F): 97 (10 Jul 2023 09:42), Max: 97.3 (10 Jul 2023 05:01)  HR: 90 (10 Jul 2023 11:00) (43 - 98)  BP: 113/65 (09 Jul 2023 18:30) (97/55 - 127/61)  BP(mean): 84 (09 Jul 2023 18:30) (73 - 88)  RR: 18 (10 Jul 2023 11:00) (18 - 20)  SpO2: 94% (10 Jul 2023 11:00) (91% - 97%)    Parameters below as of 10 Jul 2023 11:00  Patient On (Oxygen Delivery Method): room air      I&O's Summary    09 Jul 2023 07:01  -  10 Jul 2023 07:00  --------------------------------------------------------  IN: 545 mL / OUT: 1725 mL / NET: -1180 mL    10 Jul 2023 07:01  -  10 Jul 2023 12:12  --------------------------------------------------------  IN: 30 mL / OUT: 1000 mL / NET: -970 mL        PHYSICAL EXAM:  Gen: Reclining in bed at time of exam, appears stated age  HEENT: NCAT, MMM, clear OP, lower lips lesions   Neck: supple, trachea at midline, TVP in place   CV: RRR, +S1/S2  Pulm: adequate respiratory effort, no increase in work of breathing  Abd: soft, NTND  Skin: warm and dry,   Ext: WWP, no LE edema  Neuro: AOx3, no gross focal neurological deficits  Psych: affect and behavior appropriate, pleasant at time of interview  : Primafit in place     LABS:                        10.5   7.55  )-----------( 375      ( 10 Jul 2023 05:30 )             33.0     07-10    139  |  101  |  13  ----------------------------<  126<H>  4.3   |  25  |  0.64    Ca    8.9      10 Jul 2023 05:30  Phos  3.7     07-10  Mg     2.2     07-10    TPro  7.1  /  Alb  3.0<L>  /  TBili  1.1  /  DBili  x   /  AST  22  /  ALT  31  /  AlkPhos  92  07-10    LIVER FUNCTIONS - ( 10 Jul 2023 01:35 )  Alb: 3.0 g/dL / Pro: 7.1 g/dL / ALK PHOS: 92 U/L / ALT: 31 U/L / AST: 22 U/L / GGT: x           PT/INR - ( 10 Jul 2023 01:35 )   PT: 14.0 sec;   INR: 1.17          PTT - ( 10 Jul 2023 01:35 )  PTT:30.4 sec  CAPILLARY BLOOD GLUCOSE        Urinalysis Basic - ( 10 Jul 2023 05:30 )    Color: x / Appearance: x / SG: x / pH: x  Gluc: 126 mg/dL / Ketone: x  / Bili: x / Urobili: x   Blood: x / Protein: x / Nitrite: x   Leuk Esterase: x / RBC: x / WBC x   Sq Epi: x / Non Sq Epi: x / Bacteria: x        MICRODATA:      RADIOLOGY/OTHER STUDIES:

## 2023-07-10 NOTE — CHART NOTE - NSCHARTNOTEFT_GEN_A_CORE
Admitting Diagnosis:   Patient is a 72y old  Female who presents with a chief complaint of valvular disease   tachycardiac (04 Jul 2023 15:16)    PAST MEDICAL & SURGICAL HISTORY:  Atrial fibrillation  History of bronchiectasis  CAD (coronary artery disease)  Hyperlipidemia  HTN (hypertension)  History of peripheral vascular disease  Rheumatic aortic disease  History of endocarditis  History of hemoptysis  Elective surgery  bioAVR/CABG  Elective surgery  SVG-RCA 11/2018    Current Nutrition Order:  Diet, NPO after Midnight: 7/9/23 at 23:59    PO Intake: Good (%) [ x ]  Fair (50-75%) [   ] Poor (<25%) [   ]    GI Issues:   Pt denies n/v/d/c, reports last BM today 07/10    Pain:  No pain reported     Skin Integrity:  No pressure injuries or edema documented    Labs:   07-10    139  |  101  |  13  ----------------------------<  126<H>  4.3   |  25  |  0.64    Ca    8.9      10 Jul 2023 05:30  Phos  3.7     07-10  Mg     2.2     07-10    TPro  7.1  /  Alb  3.0<L>  /  TBili  1.1  /  DBili  x   /  AST  22  /  ALT  31  /  AlkPhos  92  07-10    CAPILLARY BLOOD GLUCOSE    Medications:  MEDICATIONS  (STANDING):  albuterol/ipratropium for Nebulization 3 milliLiter(s) Nebulizer every 6 hours  aspirin  chewable 81 milliGRAM(s) Oral every 24 hours  atorvastatin 20 milliGRAM(s) Oral at bedtime  budesonide  80 MICROgram(s)/formoterol 4.5 MICROgram(s) Inhaler 2 Puff(s) Inhalation two times a day  busPIRone 7.5 milliGRAM(s) Oral <User Schedule>  clonazePAM  Tablet 0.5 milliGRAM(s) Oral two times a day  clopidogrel Tablet 75 milliGRAM(s) Oral daily  furosemide    Tablet 40 milliGRAM(s) Oral daily  hydrocortisone 1% Cream 1 Application(s) Topical every 12 hours  pantoprazole   Suspension 40 milliGRAM(s) Oral daily  piperacillin/tazobactam IVPB.. 4.5 Gram(s) IV Intermittent every 8 hours  polyethylene glycol 3350 17 Gram(s) Oral daily  potassium chloride   Powder 20 milliEquivalent(s) Oral daily  QUEtiapine 25 milliGRAM(s) Oral daily  senna 2 Tablet(s) Oral at bedtime  valACYclovir 1000 milliGRAM(s) Oral every 12 hours    MEDICATIONS  (PRN):  acetaminophen   Oral Liquid .. 650 milliGRAM(s) Oral every 6 hours PRN Mild Pain (1 - 3)  benzocaine/menthol Lozenge 1 Lozenge Oral every 4 hours PRN Sore Throat  bisacodyl 5 milliGRAM(s) Oral every 12 hours PRN Constipation    Anthropometrics:  Height: 5'4.5"  Weight: 132lb/59.9kg\  BMI: 22.3    IBW: 125lb/56.8kg    % IBW: 106%    Weight Change:   No additional wts obtained since admission. Recommend nursing to trend wts weekly. RD to continue monitoring weights as able.     Estimated energy needs:   Calories: 1198-1497kcal/d (20-25kcal/kg)  Protein: 66-78g/d (1.1-1.3g/kg)  Fluid: 1497-1797mL/d (25-30mL/kg)  Estimated needs based on ABW 132lb/59.9kg as pt's wt is 106% IBW 125lb/56.8kg. Needs adjusted for age and post-op healing.     Subjective:   71yo Cantonese-speaking F PMH HTN, HLD, CAD s/p bioAVR/CABG (11/2018 SVG-RCA, Catalan Inspiris 21mm), endocarditis (no longer on lifetime PCN after bioAVR in 2018), AF on warfarin, AAA, PVD, COPD/ bronchiectasis (on Breo Ellipita and Proair) ? hx MAC, with worsening cough and hemoptysis on warfarin, s/p watchman device placement (6/28), postop Day 1 (6/29) c/b worsening cough with hemoptysis, ENT scope showing no bleeding source, and CTA Chest did not reveal source of bleed, brought to MICU (6/29) intubated/paralyzed (6/29) for bronch with findings of blood-filled R lung, IR unable to embolize. Pt was given Tranexamic acid nebs, repeat Bronch (7/1) showed no active bleeding seen, frail tissues/inflammation, BAL with Pseudomonas and started on Zosyn (6/20), clinically improving and extubated on (7/2 am). Pt transferred to [address] (7/3), failed TOV with smith cath placement (7/3). Passed TOV after smith removal ( 7/6).     On follow-up, pt sitting in chair at bedside. Labs noted: hgb/hct 10.5/33 <L>. Pt prefers Cantonese,  utilized - #[id number]. NGT removed 07/07, now advanced to soft and bite sized diet. Speech language pathologist rich 07/09 recommends continue soft & bite-sized with thin liquids. Pt reports good appetite and intake. Reports sometimes food is "too dry" but denies feeling food gets stuck in throat or making her cough. Endorses GI tolerance, reports sometimes spice in food irritates her stomach. Pt food preferences forwarded to dietary. See nutrition recommendations. RD to follow-up per protocol.     Previous Nutrition Diagnosis:  Increased nutrient needs related to increased physiological demand as evidenced by s/p OR.     Active [ x ]  Resolved [   ]    Goal:  Consistently meet >75% nutrient needs.     Recommendations:  1. Continue diet as ordered free of restrictions to promote intake. Defer consistency to team/SLP.   >>Encourage & monitor PO intake. Weir dietary preferences as able.   2. Monitor GI tolerance, weight trends, labs, & skin integrity.  3. Defer bowel and pain regimens to team.   4. RD to remain available for diet education/intervention prn.     Education:   Discussed softened foods with sauces/gravies and chewing well to enhance ease of swallowing. Encouraged pt to continue with adequate intake. Pt aware RD remains available for additional questions/concerns.     Risk Level: High [ x ] Moderate [   ] Low [   ]
MTB PRE-BRONCHOSCOPY RISK ASSESSMENT  ------------------------------------------------------------    Procedure Date:     Provider Name:     Reason for Bronchoscopy:    Location of Procedure (check one):   [  ] Endoscopy  [  ] Emergency Department  [  ] Intensive Care Unit  [  ] Operating Room  [  ] Other: _________    RISK ASSESSMENT  I. Patient symptoms (check all that apply): >/ 3 of these = SIGNIFICANT RISK for TB  [  ] Coughing > 2 to 3 weeks                 [  ] Unexplained fever >/ 2 weeks   [  ] Unusual weakness or fatigue  [  ] Unexplained weight loss > 10lbs.  [  x ] Hemoptysis                                   [  ] Unusual or night sweating  [  ] NON-APPLICABLE    II. TB history (Check all that apply): >/ 1 of these = SIGNIFICANT RISK for TB  [  ] Sputum smear/culture (+) for acid fast bacilli (AFB)                           [  ] Abnormal CXR or CT suggestive of TB; date(s) & description __________  [  ] Positive TB skin or blood test; date: __________                               [  ] On medication for latent TB or disease; list medication(s) ____________  [  ] TB diagnosed in the past; year treated: _______                                [  ] Inadequately treated TB  [  ] Current close contact of a person known or suspected to have TB  [ x ] NON-APPLICABLE    III. Additional Risk factors for TB (Check all that apply): Consider these in relation to symptoms and history  [  ] Person has conditions placing them at higher risk for TB disease (i.e. immunosuppressive therapy)  [  ] Person has lived in a country for 3 months or more where TB is common  [  ] Person lives in a high risk environment for TB (i.e. long term care, health care worker, incarcerated, homeless)  [  ] Person injects illicit drugs  [  ] Person is HIV positive or at high risk for HIV    ****************************************************************  BASED ON THE TB RISK ASSESSMENT ABOVE, THE PATIENT'S RISK FOR TB IS:                       [ x ] LOW RISK FOR TB            [  ] SIGNIFICANT RISK FOR TB  ****************************************************************    IV. Based on the Determined Risk for TB, the following Action(s) are Recommended:  [ x ] Low risk for TB infection --> Proceed with the diagnostic procedure    [  ] Significant risk for TB infection:  1. Perform the procedure in a negative pressure room, with appropriate personal protective equipment (PPE) for healthcare personnel (i.e. N95 respirator)    2. If it is not feasible to move the patient or defer the procedure:    a. Use a single-bedded room in a low traffic area to perform the bronchoscopy procedure    b. Place a portable high-efficiency particulate air (HEPA) filter in the space prior to starting the procedure and keep the door closed. Refer to Infection Control policy titled "Tuberculosis Control Strategy Plan" for additional information.    c. All healthcare personnel in the procedure room shall wear and N95 disposible respirator.    3. Documentation of the tuberculosis risk assessment is to be included in the patient's medical record.
STRUCTURAL HEART CHART UPDATE    72F w/PMH CAD s/p bioAVR/CABG (2018, 21mm Inspiris, SVG-RCA), IE on chronic PCN, afib (not on A/C), HTN, HLD, and bronchiectasis who was initially admitted for planned LAAO. Patient has had chronic hemoptysis on A/C, presumed to be from bronchiectasis, which resolves A/C. Given inability to tolerate A/C and elevated CHADSVASC, decision made to proceed w/LAAO. Prior to procedure, patient placed on DAPT w/ASA/Plavix for multiple weeks which was well tolerated. No reported episodes of hemoptysis on DAPT. Patient underwent successful, uncomplicated LAAO on 6/28 w/31mm Watchman FX device. Post-procedure pt was noted to have recurrent hemoptysis, likely due to procedural anticoagulation (unable to reverse given high risk for DRT and associated CVA). Patient hemodynamically stable, w/stable hgb and no evidence of airway compromise, so decision made to monitor o/n. As hemoptysis persisted despite normalization of coags, ENT/pulm c/s for further evaluation. No noted oropharyngeal source, so patient transferred to MICU for intubation and bronchoscopy which demonstrated active RLL bleeding which could not be easily controlled.    - Plan for CTA to evaluate for possible IR embolization  - Keep intubated for airway protection  - Patient at elevated risk for DRT and associated CVA. As patient hemodynamically stable, not req pRBC, and with protected airway would NOT discontinue DAPT at this time. Will reassess if clinical status deteriorates, but please discuss with Structural Heart team prior to discontinuation of either antiplatelet agent  - Rest of plan as per MICU    Structural Heart service will continue to follow.    --  Brooks Guardado MD  Structural Heart Disease Fellow
Admitting Diagnosis:   Patient is a 72y old  Female who presents with a chief complaint of valvular disease   tachycardiac (04 Jul 2023 15:16)      PAST MEDICAL & SURGICAL HISTORY:  Atrial fibrillation  History of bronchiectasis  CAD (coronary artery disease)  Hyperlipidemia  HTN (hypertension)  History of peripheral vascular disease  Rheumatic aortic disease  History of endocarditis  History of hemoptysis  Elective surgery  bioAVR/CABG  Elective surgery  SVG-RCA 11/2018    Current Nutrition Order:  Diet, NPO with Tube Feed via NG: Jevity 1.2 [place] @40mL/hr x18hr to provide 720mL total volume, 864kcal (14kcal/kg ABW 59.9kg), 40g pro (0.7g/kg ABW 59.9kg), and 581mL free water.     PO Intake: Good (%) [   ]  Fair (50-75%) [   ] Poor (<25%) [   ] - N/A, NPO with EN    GI Issues:   Pt denies n/v/d/c, last recorded BM today 07/05  Denies abd pain/distension    Pain:  Pt reports sore throat - pain regimen noted     Skin Integrity:  No pressure injuries or edema documented  Gildardo score 19    Labs:   07-05    140  |  104  |  16  ----------------------------<  164<H>  4.1   |  24  |  0.50    Ca    8.5      05 Jul 2023 06:01  Phos  3.0     07-05  Mg     2.1     07-05    TPro  7.0  /  Alb  3.1<L>  /  TBili  0.5  /  DBili  x   /  AST  26  /  ALT  16  /  AlkPhos  91  07-05    CAPILLARY BLOOD GLUCOSE    POCT Blood Glucose.: 138 mg/dL (05 Jul 2023 11:21)    Medications:  MEDICATIONS  (STANDING):  albuterol/ipratropium for Nebulization 3 milliLiter(s) Nebulizer every 6 hours  aspirin  chewable 81 milliGRAM(s) Oral every 24 hours  atorvastatin 20 milliGRAM(s) Oral at bedtime  budesonide  80 MICROgram(s)/formoterol 4.5 MICROgram(s) Inhaler 2 Puff(s) Inhalation two times a day  busPIRone 7.5 milliGRAM(s) Oral <User Schedule>  chlorhexidine 0.12% Liquid 5 milliLiter(s) Oral Mucosa two times a day  chlorhexidine 2% Cloths 1 Application(s) Topical <User Schedule>  clonazePAM  Tablet 0.5 milliGRAM(s) Oral two times a day  clopidogrel Tablet 75 milliGRAM(s) Oral daily  diltiazem    Tablet 30 milliGRAM(s) Oral every 6 hours  pantoprazole   Suspension 40 milliGRAM(s) Oral daily  piperacillin/tazobactam IVPB.. 4.5 Gram(s) IV Intermittent every 8 hours  polyethylene glycol 3350 17 Gram(s) Oral daily  QUEtiapine 25 milliGRAM(s) Oral daily    MEDICATIONS  (PRN):  acetaminophen   Oral Liquid .. 650 milliGRAM(s) Oral every 6 hours PRN Mild Pain (1 - 3)    Anthropometrics:  Height: 5'4.5"  Weight: 132lb/59.9kg  BMI: 22.3    IBW: 125lb/56.8kg    % IBW: 106%    Weight Change:   No additional wts obtained since admission. Recommend nursing to trend wts weekly. RD to continue monitoring weights as able.     Estimated energy needs:   Calories: 1198-1497kcal/d (20-25kcal/kg)  Protein: 66-78g/d (1.1-1.3g/kg)  Fluid: 1497-1797mL/d (25-30mL/kg)  Estimated needs based on ABW 132lb/59.9kg as pt's wt is 106% IBW 125lb/56.8kg. Needs adjusted for age and post-op healing.     Subjective:   73yo Cantonese-speaking F PMH HTN, HLD, CAD s/p bioAVR/CABG (11/2018 SVG-RCA, Catalan Inspiris 21mm), endocarditis (no longer on lifetime PCN after bioAVR in 2018), AF on warfarin, AAA, PVD, COPD/ bronchiectasis (on Breo Ellipita and Proair) ? hx MAC, with worsening cough and hemoptysis on warfarin, s/p watchman device placement (6/28), postop Day 1 (6/29) c/b worsening cough with hemoptysis, ENT scope showing no bleeding source, and CTA Chest did not reveal source of bleed, brought to MICU (6/29) intubated/paralyzed (6/29) for bronch with findings of blood-filled R lung, IR unable to embolize. Pt was given Tranexamic acid nebs, repeat Bronch (7/1) showed no active bleeding seen, frail tissues/inflammation, BAL with Pseudomonas and started on Zosyn (6/20), clinically improving and extubated on (7/2 am). Pt transferred to [address] (7/3), failed TOV with smith cath placement (7/3).     On follow-up assessment, pt sitting in chair at bedside. Pt prefers Cantonese,  used - #[id number]. Pt s/p failed SLP eval (7/3). Observed Jevity 1.2 running at 40mL/hr. Pt denies GI distress, endorses tolerance of TF. Reports sore throat, denies regurgitation of enteral feed. Endorses increased anxiety. RN notified of all pt concerns. Answered pt questions regarding TF. See nutrition recommendations. RD to follow-up per protocol.     Previous Nutrition Diagnosis:  Inadequate oral intake    Active [   ]  Resolved [   ] - discontinued given NPO/EN, see below    New PES:   Increased nutrient needs related to increased physiological demand as evidenced by s/p OR.     Goal:  Consistently meet >75% nutrient needs via feasible route.     Recommendations:  1. To better meet pt nutrition needs as medically feasible, recommend INCREASE EN: Jevity 1.2 to 67mL/hr x18hr to provide 1206mL total volume, 1447kcal (24kcal/kg ABW 59.9kg), 67g (1.1g/kg ABW 59.9kg), and 973mL total fluid.  >>Defer free water flushes to team.  >>Monitor GI tolerance & maintain aspiration precautions. RD to remain available to adjust EN regimen prn.   2. Monitor weight trends, labs, skin integrity, & hydration status.   3. RD remains available to provide dietary education prn.     Education:   Discussed purpose of EN as sole source nutrition. Pt aware RD remains available for additional questions/concerns.     Risk Level: High [ x ] Moderate [   ] Low [   ]

## 2023-07-11 LAB
ANION GAP SERPL CALC-SCNC: 12 MMOL/L — SIGNIFICANT CHANGE UP (ref 5–17)
BUN SERPL-MCNC: 17 MG/DL — SIGNIFICANT CHANGE UP (ref 7–23)
CALCIUM SERPL-MCNC: 8.6 MG/DL — SIGNIFICANT CHANGE UP (ref 8.4–10.5)
CHLORIDE SERPL-SCNC: 97 MMOL/L — SIGNIFICANT CHANGE UP (ref 96–108)
CO2 SERPL-SCNC: 28 MMOL/L — SIGNIFICANT CHANGE UP (ref 22–31)
CREAT SERPL-MCNC: 0.77 MG/DL — SIGNIFICANT CHANGE UP (ref 0.5–1.3)
EGFR: 81 ML/MIN/1.73M2 — SIGNIFICANT CHANGE UP
GLUCOSE SERPL-MCNC: 135 MG/DL — HIGH (ref 70–99)
HCT VFR BLD CALC: 34.2 % — LOW (ref 34.5–45)
HGB BLD-MCNC: 11.1 G/DL — LOW (ref 11.5–15.5)
MAGNESIUM SERPL-MCNC: 2.2 MG/DL — SIGNIFICANT CHANGE UP (ref 1.6–2.6)
MCHC RBC-ENTMCNC: 30 PG — SIGNIFICANT CHANGE UP (ref 27–34)
MCHC RBC-ENTMCNC: 32.5 GM/DL — SIGNIFICANT CHANGE UP (ref 32–36)
MCV RBC AUTO: 92.4 FL — SIGNIFICANT CHANGE UP (ref 80–100)
NRBC # BLD: 0 /100 WBCS — SIGNIFICANT CHANGE UP (ref 0–0)
PLATELET # BLD AUTO: 409 K/UL — HIGH (ref 150–400)
POTASSIUM SERPL-MCNC: 3.2 MMOL/L — LOW (ref 3.5–5.3)
POTASSIUM SERPL-SCNC: 3.2 MMOL/L — LOW (ref 3.5–5.3)
RBC # BLD: 3.7 M/UL — LOW (ref 3.8–5.2)
RBC # FLD: 14 % — SIGNIFICANT CHANGE UP (ref 10.3–14.5)
SODIUM SERPL-SCNC: 137 MMOL/L — SIGNIFICANT CHANGE UP (ref 135–145)
WBC # BLD: 6.28 K/UL — SIGNIFICANT CHANGE UP (ref 3.8–10.5)
WBC # FLD AUTO: 6.28 K/UL — SIGNIFICANT CHANGE UP (ref 3.8–10.5)

## 2023-07-11 PROCEDURE — 71045 X-RAY EXAM CHEST 1 VIEW: CPT | Mod: 26

## 2023-07-11 PROCEDURE — 99232 SBSQ HOSP IP/OBS MODERATE 35: CPT

## 2023-07-11 RX ORDER — ACYCLOVIR 50 MG/G
1 OINTMENT TOPICAL
Refills: 0 | Status: DISCONTINUED | OUTPATIENT
Start: 2023-07-11 | End: 2023-07-12

## 2023-07-11 RX ORDER — POTASSIUM CHLORIDE 20 MEQ
40 PACKET (EA) ORAL ONCE
Refills: 0 | Status: DISCONTINUED | OUTPATIENT
Start: 2023-07-11 | End: 2023-07-11

## 2023-07-11 RX ORDER — DILTIAZEM HCL 120 MG
120 CAPSULE, EXT RELEASE 24 HR ORAL DAILY
Refills: 0 | Status: DISCONTINUED | OUTPATIENT
Start: 2023-07-11 | End: 2023-07-12

## 2023-07-11 RX ORDER — POTASSIUM CHLORIDE 20 MEQ
40 PACKET (EA) ORAL ONCE
Refills: 0 | Status: COMPLETED | OUTPATIENT
Start: 2023-07-11 | End: 2023-07-11

## 2023-07-11 RX ADMIN — Medication 81 MILLIGRAM(S): at 05:36

## 2023-07-11 RX ADMIN — Medication 1 APPLICATION(S): at 05:36

## 2023-07-11 RX ADMIN — ACYCLOVIR 1 APPLICATION(S): 50 OINTMENT TOPICAL at 17:09

## 2023-07-11 RX ADMIN — Medication 7.5 MILLIGRAM(S): at 05:37

## 2023-07-11 RX ADMIN — PIPERACILLIN AND TAZOBACTAM 25 GRAM(S): 4; .5 INJECTION, POWDER, LYOPHILIZED, FOR SOLUTION INTRAVENOUS at 13:23

## 2023-07-11 RX ADMIN — Medication 3 MILLILITER(S): at 11:59

## 2023-07-11 RX ADMIN — Medication 3 MILLILITER(S): at 04:24

## 2023-07-11 RX ADMIN — Medication 3 MILLILITER(S): at 21:31

## 2023-07-11 RX ADMIN — Medication 20 MILLIEQUIVALENT(S): at 11:59

## 2023-07-11 RX ADMIN — Medication 3 MILLILITER(S): at 15:43

## 2023-07-11 RX ADMIN — BUDESONIDE AND FORMOTEROL FUMARATE DIHYDRATE 2 PUFF(S): 160; 4.5 AEROSOL RESPIRATORY (INHALATION) at 05:41

## 2023-07-11 RX ADMIN — CLOPIDOGREL BISULFATE 75 MILLIGRAM(S): 75 TABLET, FILM COATED ORAL at 11:59

## 2023-07-11 RX ADMIN — Medication 120 MILLIGRAM(S): at 10:46

## 2023-07-11 RX ADMIN — BUDESONIDE AND FORMOTEROL FUMARATE DIHYDRATE 2 PUFF(S): 160; 4.5 AEROSOL RESPIRATORY (INHALATION) at 19:03

## 2023-07-11 RX ADMIN — VALACYCLOVIR 1000 MILLIGRAM(S): 500 TABLET, FILM COATED ORAL at 15:43

## 2023-07-11 RX ADMIN — Medication 0.5 MILLIGRAM(S): at 18:51

## 2023-07-11 RX ADMIN — PIPERACILLIN AND TAZOBACTAM 25 GRAM(S): 4; .5 INJECTION, POWDER, LYOPHILIZED, FOR SOLUTION INTRAVENOUS at 21:31

## 2023-07-11 RX ADMIN — Medication 40 MILLIGRAM(S): at 05:37

## 2023-07-11 RX ADMIN — QUETIAPINE FUMARATE 25 MILLIGRAM(S): 200 TABLET, FILM COATED ORAL at 18:51

## 2023-07-11 RX ADMIN — PIPERACILLIN AND TAZOBACTAM 25 GRAM(S): 4; .5 INJECTION, POWDER, LYOPHILIZED, FOR SOLUTION INTRAVENOUS at 05:36

## 2023-07-11 RX ADMIN — ATORVASTATIN CALCIUM 20 MILLIGRAM(S): 80 TABLET, FILM COATED ORAL at 21:31

## 2023-07-11 RX ADMIN — PANTOPRAZOLE SODIUM 40 MILLIGRAM(S): 20 TABLET, DELAYED RELEASE ORAL at 11:59

## 2023-07-11 RX ADMIN — VALACYCLOVIR 1000 MILLIGRAM(S): 500 TABLET, FILM COATED ORAL at 04:24

## 2023-07-11 RX ADMIN — Medication 0.5 MILLIGRAM(S): at 05:37

## 2023-07-11 RX ADMIN — Medication 40 MILLIEQUIVALENT(S): at 08:47

## 2023-07-11 NOTE — PROGRESS NOTE ADULT - ASSESSMENT
71yo Cantonese-speaking F PMH HTN, HLD, CAD s/p bioAVR/CABG (11/2018 SVG-RCA, Catalan Inspiris 21mm), endocarditis ( no longer on lifetime PCN after bioAVR in 2018), AF on warfarin, AAA, PVD, COPD/ bronchiectasis ( on Breo Ellipita and Proair) ? hx MAC, with worsening cough and hemoptysis on warfarin, s/p watchman device placement ( 6/28), postop Day 1 (6/29)c/b worsening cough with hemoptysis, ENT scope showing no bleeding source, and CTA Chest did not reveal source of bleed, brought to MICU ( 6/29) intubated/paralyzed ( 6/29) for bronch with findings of blood-filled R lung, IR unable to embolize. Pt was given Tranexamic acid nebs, repeat Bronch ( 7/1) showed no active bleeding seen, frail tissues/inflammation, BAL with Pseudomonas and started on Zosyn ( 6/20 @ 2200), clinically improving and extubated on ( 7/2 am). Pt failed SLP eval ( 7/3), keep NPO/NGT feed. Pt deemed medically stable and transferred to [address] ( 7/3), failed TOV with smith cath placement ( 7/3).     # COPD  # Bronchiectasis exacerbation ( Pseudomonas)   # Massive hemoptysis -resolved.  # Acute hypoxic respiratory failure   # Dysphagia  # CAD s/p CABG   # BioAVR   # Hx chronic Endocarditis ( on lifelong PCN)   # AFib s/p Watchman device ( 6/28)   # HTN  # HLD  # Depression/Anxiety  # New onset Atrial Flutter w. variable blocks( 7/4)   # Oral Herpes( s/p Valtrex 7/9)     - Pt was transferred out of MICU to [address] (7/3)  - Transferred to [facility] (7/4) under structural heart specialist, Dr. Moose Johnson   - the new onset Atrial Flutter -was given dig/diltiazem --> ally/converted to sinus - was placed TVP briefly, now removed- being monitored on tele for possible need for PPM  - EP f/u appreciated, Dr. Figueroa-  - cw diuretics lasix 40 mg po q daily.  - now off O2 with SpO2 96% RA, keep K>4 and Mg>2     - c/w DAPT: ASA 81mg po daily and Clopidogrel 75mg po daily for at least 45 days and plan for repeat CT  - HLD: Atorvastatin 20mg po qHS   - Pulm f/u appreciated     - Collateral obtained from primary Pulm Dr. 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 4.5 Gram(s) IV Intermittent every 8 hours ( 6/30 @ 2200) , total course 14 days, plan on transitioning to Levoquin po when ready for d/c   - Oral Herpes: getting Valtrex 1000 bid ( started 7/10- )   - add oral acyclor cream ( topical application )   - BCx: NGTD  - ICS/LABA: budesonide  80 MICROgram(s)/formoterol 4.5 MICROgram(s) Inhaler 2 Puff(s) Inhalation two times a day  ( uses Albuterol 90mcg/inh 2 puffs q4h PRN and Breo Ellipta 100/25 mcg/INH at home)   - STEWART/ROSSY: albuterol/ipratropium for Nebulization 3 milliLiter(s) Nebulizer every 6 hours ( Proair prn at home)   - add lozenges for throat pain   - s/p Extubation (7/2)  - SLP eval appreciated, NGT removed ( 7/7)    - pt tolerating regular diet ( 7/7)   - hx Anxiety:        clonazePAM  Tablet 0.5 milliGRAM(s) Oral two times a day       busPIRone 7.5 milliGRAM(s) Oral q HS <User Schedule>       QUEtiapine 25 milliGRAM(s) Oral daily   - Smith d/c'd ( 7/3) but failed TOV with smith cath replacement ( 7/3), passed TOV after smith removal ( 7/6)   - monitor bowel movement   - PT eval please ( OOBTC as tolerated)    GI ppx: protonix 40mg  DVT ppx: holding pharmacologic ppx for now given hemoptysis; SCDs  Code status: FULL CODE    Dispo: PT evaluation, follow up with EP ( being monitored on tele for possible need for PPM )    PMD: Dr.Helang Philippe Marvin ( [facility]) [phone]  Pulm; Dr. Efren Wise [phone]  [facility] Cardiology: Dr. Mitul Gonzalez/ Dr. Nubia Minor [phone]  [facility] [phone] ( [address] [place] [address])   Family contact: Joseph Vazquez (son) [phone]    POC as d/w 9 LA team

## 2023-07-11 NOTE — PROGRESS NOTE ADULT - ASSESSMENT
73 y/o F, Cantonese speaking, PMHx significant for HTN, HLD, CAD (hx of CABG in 2018), aortic valve disease (hx of bioprosthetic AVR in 2018), hx of AV endocarditis, AF, PVD, and COPD/bronchiectasis. Pt with worsening hemoptysis on warfarin therapy and decision made to proceed with surgical intervention of appendage closure. Underwent NOEMÍ closure with Watchman device 6/28/23 with Dr. Cervantes. POD1 noted to have worsening cough and hemoptysis. ENT consulted and patient had bedside scope that revealed no active bleeding source. Pulm consulted and pt transferred to MICU 6/29, intubated and bronch showed RLL bleed. S/p tranexamic acid. Repeat bronch 7/1 negative or active bleeding. BAL + pseudomonas, started on Zosyn therapy. Unable to tolerate PO post extubation and NGT placed per SLP, and TF started. 7/4 episodes of rapid AF, 150's. Transferred back to [facility] for management. EP consulted and pt given digoxin load in addition to diltiazem 30mg q6hr with improvement in rates. Continue Cardizem and Dig 250mcg daily per EP. 7/7 pt tolerating soft diet. Continuing Abx for total 14 days (on day 10) for PNA. 7/10 yesterday evening went into heart block with bradycardia into the 30s. TVP placed in RIJ. Overnight stable. Pending EP plan.     Plan:    Neurovascular:   -Pain well controlled with current regimen. PRN's: tylenol,   -depression/anxiety    -c/w klonapin, seroquel, buspar    Cardiovascular:   -Hemodynamically stable.   -Monitor: BP, HR, tele  -CAD    -c/w ASA/Plavix  -Afib    -dig and DCed after development of heart block    -restart Dilt 120XL per EP and observe overnight  Fluid overload    -c/w lasix 20mg IV BID  -HLD    -c/w lipitor  -Heart block    -RIJ TVP in place    -pending plan for EP    -currently in NSR    Respiratory:   -Oxygenating well on room air  -Encourage continued use of IS 10x/hr and frequent ambulation  -PNA    -c/w zosyn 4.5 Q 8h for a total of 14 days (started 6/30)    -if pt is discharged, she can switch to levaquin per pulm    -hypoxia, continue to wean O2, home O2 set up initiated    GI:  -GI PPX: protonix  -PO Diet  -Bowel Regimen: miralax, dulcolax, senna    Renal / :  -Continue to monitor renal function: BUN/Cr 17/.7  -Monitor I/O's daily     Endocrine:    -No hx of DM or thyroid dx  -A1c: 5.9  -TSH: 1.8    Hematologic:  -CBC: H/H- 11.1/34.2  -Coagulation Panel.    ID:  -Temperature: 36.1  -CBC: WBC- 6.2  -Continue to observe for SIRS/Sepsis Syndrome.    Prophylaxis:  -DVT prophylaxis with SCDs given hempotysis  -Continue with SCD's b/l while patient is at rest     Disposition:  -Discharge home once patient is medically ready

## 2023-07-11 NOTE — PROGRESS NOTE ADULT - ASSESSMENT
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 Pt with worsening hemoptysis on warfarin therapy and decision made to proceed with surgical intervention of appendage closure. Underwent NOEMÍ closure with Watchman device 6/28/23 with worsening cough and hemoptysis on POD 1. ENT consulted and patient had bedside scope that revealed no active bleeding source. Patient was transferred to MICU 6/29, intubated and s/p bronch showing RLL bleed. S/p tranexamic acid with repeat bronch 7/1 negative for active bleeding. Hospital course also significant for BAL + pseudomonas, (started on Zosyn therapy) and rapid AF ('s), initially managed with digoxin in addition to diltiazem. Transferred back to [facility] for management. EP consulted for management of paroxysmal afib and bradycardia. Digoxin discontinued 7/10 due to bradycardia into the 30s,  TVP placed in RIJ.  Currently, patient is hemodynamically stable, in sinus rhythm with PACs without recurrence of bradyarrhythmia thus far, on diltiazem only.       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 Presented with worsening hemoptysis on warfarin therapy and decision made to proceed with surgical intervention of appendage closure. Underwent NOEMÍ closure with Watchman device 6/28/23 (Dr. Minor and Dr. Sawyer) with worsening cough and hemoptysis on POD # 1. ENT consulted and patient had bedside scope that revealed no active bleeding source. Patient was transferred to MICU 6/29, intubated and s/p bronch showing RLL bleed. S/p tranexamic acid with repeat bronch 7/1 negative for active bleeding. Hospital course also significant for BAL + pseudomonas (treated with antibiotics) and rapid AF ('s), initially managed with digoxin in addition to diltiazem. Transferred back to [facility] for management. EP consulted for management of paroxysmal afib with RVR and junctional escape rhythm with eventual conversion to sinus rhythm. Digoxin discontinued 7/10 due to bradycardia into the 30s,  TVP placed in RIJ. Digoxin level normal. On ASA/Plavix, CBC stable. Currently, patient is hemodynamically stable, in sinus rhythm with PACs without recurrence of bradyarrhythmia thus far, on diltiazem only.       Plan:  Recommend continuation of telemetry monitoring  Continue Diltiazem CD 120mg daily with holding parameters  Aim to keep electrolytes within range, goal for K >4, Mg >2, cCa+ >8,  to minimize proarrhythmic triggers

## 2023-07-11 NOTE — PROGRESS NOTE ADULT - SUBJECTIVE AND OBJECTIVE BOX
EPS Progress Note    S: Patient seen at bedside, accompanied by family member. [employer]  (via telephone ) assisted with interpretation. Patient denies any CP, palpitations, dizziness/palpitations. Reports ongoing SOB and discomfort from oral lesions.     O: T(C): 36.6 (07-11-23 @ 13:51), Max: 36.6 (07-11-23 @ 13:51)  HR: 89 (07-11-23 @ 12:46) (85 - 95)  BP: 99/58 (07-11-23 @ 12:46) (99/58 - 117/61)  RR: 16 (07-11-23 @ 12:46) (16 - 17)  SpO2: 95% (07-11-23 @ 12:46) (93% - 95%)  Wt(kg): --    TELE: Sinus rhythm with HR 80s-90s bpm, PACs, V-couplet     PHYSICAL  General:  NAD        Chest:  decreased BS CTA B/L, no w/r/r  Cardiac:  RRR, + s1/s2 , III/VI DAISY heard best over precordium   Abdomen:   soft ND/NT  Extremities: No edema  Skin: no rash noted, normal color and pigmentation  Psych: A&Ox3, normal affect and mood  Neuro: no deficit noted     LABS:                        11.1   6.28  )-----------( 409      ( 11 Jul 2023 05:30 )             34.2     07-11    137  |  97  |  17  ----------------------------<  135<H>  3.2<L>   |  28  |  0.77    Ca    8.6      11 Jul 2023 05:30  Phos  3.7     07-10  Mg     2.2     07-11    TPro  7.1  /  Alb  3.0<L>  /  TBili  1.1  /  DBili  x   /  AST  22  /  ALT  31  /  AlkPhos  92  07-10    PT/INR - ( 10 Jul 2023 01:35 )   PT: 14.0 sec;   INR: 1.17          PTT - ( 10 Jul 2023 01:35 )  PTT:30.4 sec      MEDICATIONS:  acetaminophen   Oral Liquid .. 650 milliGRAM(s) Oral every 6 hours PRN  acyclovir Topical 5% Ointment 1 Application(s) Topical two times a day  albuterol/ipratropium for Nebulization 3 milliLiter(s) Nebulizer every 6 hours  aspirin  chewable 81 milliGRAM(s) Oral every 24 hours  atorvastatin 20 milliGRAM(s) Oral at bedtime  benzocaine/menthol Lozenge 1 Lozenge Oral every 4 hours PRN  bisacodyl 5 milliGRAM(s) Oral every 12 hours PRN  budesonide  80 MICROgram(s)/formoterol 4.5 MICROgram(s) Inhaler 2 Puff(s) Inhalation two times a day  busPIRone 7.5 milliGRAM(s) Oral <User Schedule>  clonazePAM  Tablet 0.5 milliGRAM(s) Oral two times a day  clopidogrel Tablet 75 milliGRAM(s) Oral daily  diltiazem    milliGRAM(s) Oral daily  furosemide    Tablet 40 milliGRAM(s) Oral daily  hydrocortisone 1% Cream 1 Application(s) Topical every 12 hours  pantoprazole   Suspension 40 milliGRAM(s) Oral daily  piperacillin/tazobactam IVPB.. 4.5 Gram(s) IV Intermittent every 8 hours  polyethylene glycol 3350 17 Gram(s) Oral daily  potassium chloride   Powder 20 milliEquivalent(s) Oral daily  QUEtiapine 25 milliGRAM(s) Oral daily  senna 2 Tablet(s) Oral at bedtime  valACYclovir 1000 milliGRAM(s) Oral every 12 hours      < from: TTE Echo Complete w/o Contrast w/ Doppler (06.29.23 @ 09:00) >  CONCLUSIONS:     1. Technically difficult study.   2. The left ventricle is normal in size, wall thickness, and systolic   function with a calculated ejection fraction of 65-70%.   3. The apical segments are not well visualized-possible apical   hypertrophy-consider repeat imaging with echo contrast if clinically   indicated.   4. Normal right ventricular size and systolic function.   5. Normal atria.   6. Bioprosthetic valve is seen in the aortic position withnormal   function.   7. No evidence of pulmonary hypertension.   8. No pericardial effusion.    < end of copied text >

## 2023-07-11 NOTE — PROGRESS NOTE ADULT - SUBJECTIVE AND OBJECTIVE BOX
GERMÁN GUPTA , [id number],  [facility] [address]LA [id number] [id number]    Time of encounter : 10:30 am   addison - [id number]  seen sitting on bedside chair.  was npo for possible PPM placement- per EP no need for PPM -diet to be resumed  herpes labiasis -   dyspnea with exertion.        T(C): 36.6 (07-11-23 @ 13:51), Max: 36.6 (07-11-23 @ 13:51)  HR: 89 (07-11-23 @ 12:46) (85 - 95)  BP: 99/58 (07-11-23 @ 12:46) (99/58 - 117/61)  RR: 16 (07-11-23 @ 12:46) (16 - 16)  SpO2: 95% (07-11-23 @ 12:46) (93% - 95%)    acetaminophen   Oral Liquid .. 650 milliGRAM(s) Oral every 6 hours PRN  acyclovir Topical 5% Ointment 1 Application(s) Topical two times a day  albuterol/ipratropium for Nebulization 3 milliLiter(s) Nebulizer every 6 hours  aspirin  chewable 81 milliGRAM(s) Oral every 24 hours  atorvastatin 20 milliGRAM(s) Oral at bedtime  benzocaine/menthol Lozenge 1 Lozenge Oral every 4 hours PRN  bisacodyl 5 milliGRAM(s) Oral every 12 hours PRN  budesonide  80 MICROgram(s)/formoterol 4.5 MICROgram(s) Inhaler 2 Puff(s) Inhalation two times a day  busPIRone 7.5 milliGRAM(s) Oral <User Schedule>  clonazePAM  Tablet 0.5 milliGRAM(s) Oral two times a day  clopidogrel Tablet 75 milliGRAM(s) Oral daily  diltiazem    milliGRAM(s) Oral daily  furosemide    Tablet 40 milliGRAM(s) Oral daily  hydrocortisone 1% Cream 1 Application(s) Topical every 12 hours  pantoprazole   Suspension 40 milliGRAM(s) Oral daily  piperacillin/tazobactam IVPB.. 4.5 Gram(s) IV Intermittent every 8 hours  polyethylene glycol 3350 17 Gram(s) Oral daily  potassium chloride   Powder 20 milliEquivalent(s) Oral daily  QUEtiapine 25 milliGRAM(s) Oral daily  senna 2 Tablet(s) Oral at bedtime  valACYclovir 1000 milliGRAM(s) Oral every 12 hours      Physical Exam :    General exam :  sitting on bedside chair.  herpes labialis  good air entry bilaterally.   no edema noted on bilateral lower ext.  neuro aao x 3 non focal.       CBC Full  -  ( 11 Jul 2023 05:30 )  WBC Count : 6.28 K/uL  RBC Count : 3.70 M/uL  Hemoglobin : 11.1 g/dL  Hematocrit : 34.2 %  Platelet Count - Automated : 409 K/uL  Mean Cell Volume : 92.4 fl  Mean Cell Hemoglobin : 30.0 pg  Mean Cell Hemoglobin Concentration : 32.5 gm/dL  Auto Neutrophil # : x  Auto Lymphocyte # : x  Auto Monocyte # : x  Auto Eosinophil # : x  Auto Basophil # : x  Auto Neutrophil % : x  Auto Lymphocyte % : x  Auto Monocyte % : x  Auto Eosinophil % : x  Auto Basophil % : x        07-11    137  |  97  |  17  ----------------------------<  135<H>  3.2<L>   |  28  |  0.77    Ca    8.6      11 Jul 2023 05:30  Phos  3.7     07-10  Mg     2.2     07-11    TPro  7.1  /  Alb  3.0<L>  /  TBili  1.1  /  DBili  x   /  AST  22  /  ALT  31  /  AlkPhos  92  07-10    Daily     Daily   CAPILLARY BLOOD GLUCOSE          Urinalysis Basic - ( 11 Jul 2023 05:30 )    Color: x / Appearance: x / SG: x / pH: x  Gluc: 135 mg/dL / Ketone: x  / Bili: x / Urobili: x   Blood: x / Protein: x / Nitrite: x   Leuk Esterase: x / RBC: x / WBC x   Sq Epi: x / Non Sq Epi: x / Bacteria: x        PT/INR - ( 10 Jul 2023 01:35 )   PT: 14.0 sec;   INR: 1.17          PTT - ( 10 Jul 2023 01:35 )  PTT:30.4 sec

## 2023-07-12 ENCOUNTER — TRANSCRIPTION ENCOUNTER (OUTPATIENT)
Age: 73
End: 2023-07-12

## 2023-07-12 VITALS — TEMPERATURE: 98 F

## 2023-07-12 PROBLEM — Z86.79 PERSONAL HISTORY OF OTHER DISEASES OF THE CIRCULATORY SYSTEM: Chronic | Status: ACTIVE | Noted: 2023-06-27

## 2023-07-12 PROBLEM — I10 ESSENTIAL (PRIMARY) HYPERTENSION: Chronic | Status: ACTIVE | Noted: 2023-06-27

## 2023-07-12 LAB
ANION GAP SERPL CALC-SCNC: 9 MMOL/L — SIGNIFICANT CHANGE UP (ref 5–17)
BUN SERPL-MCNC: 19 MG/DL — SIGNIFICANT CHANGE UP (ref 7–23)
CALCIUM SERPL-MCNC: 8.7 MG/DL — SIGNIFICANT CHANGE UP (ref 8.4–10.5)
CHLORIDE SERPL-SCNC: 103 MMOL/L — SIGNIFICANT CHANGE UP (ref 96–108)
CO2 SERPL-SCNC: 28 MMOL/L — SIGNIFICANT CHANGE UP (ref 22–31)
CREAT SERPL-MCNC: 0.76 MG/DL — SIGNIFICANT CHANGE UP (ref 0.5–1.3)
DIGITOXIN SERPL-MCNC: <5 NG/ML — LOW (ref 10–25)
EGFR: 82 ML/MIN/1.73M2 — SIGNIFICANT CHANGE UP
GLUCOSE SERPL-MCNC: 128 MG/DL — HIGH (ref 70–99)
HCT VFR BLD CALC: 32 % — LOW (ref 34.5–45)
HGB BLD-MCNC: 10.3 G/DL — LOW (ref 11.5–15.5)
MAGNESIUM SERPL-MCNC: 2.2 MG/DL — SIGNIFICANT CHANGE UP (ref 1.6–2.6)
MCHC RBC-ENTMCNC: 30.2 PG — SIGNIFICANT CHANGE UP (ref 27–34)
MCHC RBC-ENTMCNC: 32.2 GM/DL — SIGNIFICANT CHANGE UP (ref 32–36)
MCV RBC AUTO: 93.8 FL — SIGNIFICANT CHANGE UP (ref 80–100)
NRBC # BLD: 0 /100 WBCS — SIGNIFICANT CHANGE UP (ref 0–0)
PLATELET # BLD AUTO: 414 K/UL — HIGH (ref 150–400)
POTASSIUM SERPL-MCNC: 3.5 MMOL/L — SIGNIFICANT CHANGE UP (ref 3.5–5.3)
POTASSIUM SERPL-SCNC: 3.5 MMOL/L — SIGNIFICANT CHANGE UP (ref 3.5–5.3)
RBC # BLD: 3.41 M/UL — LOW (ref 3.8–5.2)
RBC # FLD: 14 % — SIGNIFICANT CHANGE UP (ref 10.3–14.5)
SODIUM SERPL-SCNC: 140 MMOL/L — SIGNIFICANT CHANGE UP (ref 135–145)
WBC # BLD: 7.26 K/UL — SIGNIFICANT CHANGE UP (ref 3.8–10.5)
WBC # FLD AUTO: 7.26 K/UL — SIGNIFICANT CHANGE UP (ref 3.8–10.5)

## 2023-07-12 PROCEDURE — 85610 PROTHROMBIN TIME: CPT

## 2023-07-12 PROCEDURE — 86901 BLOOD TYPING SEROLOGIC RH(D): CPT

## 2023-07-12 PROCEDURE — 93306 TTE W/DOPPLER COMPLETE: CPT

## 2023-07-12 PROCEDURE — 80162 ASSAY OF DIGOXIN TOTAL: CPT

## 2023-07-12 PROCEDURE — 84300 ASSAY OF URINE SODIUM: CPT

## 2023-07-12 PROCEDURE — 80299 QUANTITATIVE ASSAY DRUG: CPT

## 2023-07-12 PROCEDURE — 87040 BLOOD CULTURE FOR BACTERIA: CPT

## 2023-07-12 PROCEDURE — 97116 GAIT TRAINING THERAPY: CPT

## 2023-07-12 PROCEDURE — C1760: CPT

## 2023-07-12 PROCEDURE — 94640 AIRWAY INHALATION TREATMENT: CPT

## 2023-07-12 PROCEDURE — 86036 ANCA SCREEN EACH ANTIBODY: CPT

## 2023-07-12 PROCEDURE — 85025 COMPLETE CBC W/AUTO DIFF WBC: CPT

## 2023-07-12 PROCEDURE — 97161 PT EVAL LOW COMPLEX 20 MIN: CPT

## 2023-07-12 PROCEDURE — 86225 DNA ANTIBODY NATIVE: CPT

## 2023-07-12 PROCEDURE — 87641 MR-STAPH DNA AMP PROBE: CPT

## 2023-07-12 PROCEDURE — 82785 ASSAY OF IGE: CPT

## 2023-07-12 PROCEDURE — C8925: CPT

## 2023-07-12 PROCEDURE — 80048 BASIC METABOLIC PNL TOTAL CA: CPT

## 2023-07-12 PROCEDURE — 36415 COLL VENOUS BLD VENIPUNCTURE: CPT

## 2023-07-12 PROCEDURE — 92610 EVALUATE SWALLOWING FUNCTION: CPT

## 2023-07-12 PROCEDURE — 86038 ANTINUCLEAR ANTIBODIES: CPT

## 2023-07-12 PROCEDURE — 93005 ELECTROCARDIOGRAM TRACING: CPT

## 2023-07-12 PROCEDURE — 87102 FUNGUS ISOLATION CULTURE: CPT

## 2023-07-12 PROCEDURE — 86431 RHEUMATOID FACTOR QUANT: CPT

## 2023-07-12 PROCEDURE — 84155 ASSAY OF PROTEIN SERUM: CPT

## 2023-07-12 PROCEDURE — 80053 COMPREHEN METABOLIC PANEL: CPT

## 2023-07-12 PROCEDURE — 82962 GLUCOSE BLOOD TEST: CPT

## 2023-07-12 PROCEDURE — C1769: CPT

## 2023-07-12 PROCEDURE — 81001 URINALYSIS AUTO W/SCOPE: CPT

## 2023-07-12 PROCEDURE — 87640 STAPH A DNA AMP PROBE: CPT

## 2023-07-12 PROCEDURE — 82103 ALPHA-1-ANTITRYPSIN TOTAL: CPT

## 2023-07-12 PROCEDURE — 83605 ASSAY OF LACTIC ACID: CPT

## 2023-07-12 PROCEDURE — 89051 BODY FLUID CELL COUNT: CPT

## 2023-07-12 PROCEDURE — 86923 COMPATIBILITY TEST ELECTRIC: CPT

## 2023-07-12 PROCEDURE — 82330 ASSAY OF CALCIUM: CPT

## 2023-07-12 PROCEDURE — C1894: CPT

## 2023-07-12 PROCEDURE — 99232 SBSQ HOSP IP/OBS MODERATE 35: CPT

## 2023-07-12 PROCEDURE — 97164 PT RE-EVAL EST PLAN CARE: CPT

## 2023-07-12 PROCEDURE — 84132 ASSAY OF SERUM POTASSIUM: CPT

## 2023-07-12 PROCEDURE — 94002 VENT MGMT INPAT INIT DAY: CPT

## 2023-07-12 PROCEDURE — 87070 CULTURE OTHR SPECIMN AEROBIC: CPT

## 2023-07-12 PROCEDURE — 82784 ASSAY IGA/IGD/IGG/IGM EACH: CPT

## 2023-07-12 PROCEDURE — 86606 ASPERGILLUS ANTIBODY: CPT

## 2023-07-12 PROCEDURE — C1893: CPT

## 2023-07-12 PROCEDURE — C1889: CPT

## 2023-07-12 PROCEDURE — 86850 RBC ANTIBODY SCREEN: CPT

## 2023-07-12 PROCEDURE — 82803 BLOOD GASES ANY COMBINATION: CPT

## 2023-07-12 PROCEDURE — 86160 COMPLEMENT ANTIGEN: CPT

## 2023-07-12 PROCEDURE — 71275 CT ANGIOGRAPHY CHEST: CPT

## 2023-07-12 PROCEDURE — 87206 SMEAR FLUORESCENT/ACID STAI: CPT

## 2023-07-12 PROCEDURE — 82570 ASSAY OF URINE CREATININE: CPT

## 2023-07-12 PROCEDURE — 86334 IMMUNOFIX E-PHORESIS SERUM: CPT

## 2023-07-12 PROCEDURE — P9045: CPT

## 2023-07-12 PROCEDURE — 85730 THROMBOPLASTIN TIME PARTIAL: CPT

## 2023-07-12 PROCEDURE — 86200 CCP ANTIBODY: CPT

## 2023-07-12 PROCEDURE — 84295 ASSAY OF SERUM SODIUM: CPT

## 2023-07-12 PROCEDURE — 99238 HOSP IP/OBS DSCHRG MGMT 30/<: CPT

## 2023-07-12 PROCEDURE — 92526 ORAL FUNCTION THERAPY: CPT

## 2023-07-12 PROCEDURE — 71045 X-RAY EXAM CHEST 1 VIEW: CPT

## 2023-07-12 PROCEDURE — 87015 SPECIMEN INFECT AGNT CONCNTJ: CPT

## 2023-07-12 PROCEDURE — 97535 SELF CARE MNGMENT TRAINING: CPT

## 2023-07-12 PROCEDURE — 86235 NUCLEAR ANTIGEN ANTIBODY: CPT

## 2023-07-12 PROCEDURE — 97165 OT EVAL LOW COMPLEX 30 MIN: CPT

## 2023-07-12 PROCEDURE — 84100 ASSAY OF PHOSPHORUS: CPT

## 2023-07-12 PROCEDURE — 97530 THERAPEUTIC ACTIVITIES: CPT

## 2023-07-12 PROCEDURE — 83735 ASSAY OF MAGNESIUM: CPT

## 2023-07-12 PROCEDURE — 86900 BLOOD TYPING SEROLOGIC ABO: CPT

## 2023-07-12 PROCEDURE — 84443 ASSAY THYROID STIM HORMONE: CPT

## 2023-07-12 PROCEDURE — 94003 VENT MGMT INPAT SUBQ DAY: CPT

## 2023-07-12 PROCEDURE — 83036 HEMOGLOBIN GLYCOSYLATED A1C: CPT

## 2023-07-12 PROCEDURE — 83880 ASSAY OF NATRIURETIC PEPTIDE: CPT

## 2023-07-12 PROCEDURE — 84156 ASSAY OF PROTEIN URINE: CPT

## 2023-07-12 PROCEDURE — 84540 ASSAY OF URINE/UREA-N: CPT

## 2023-07-12 PROCEDURE — 84165 PROTEIN E-PHORESIS SERUM: CPT

## 2023-07-12 PROCEDURE — C1887: CPT

## 2023-07-12 PROCEDURE — 85027 COMPLETE CBC AUTOMATED: CPT

## 2023-07-12 PROCEDURE — 83516 IMMUNOASSAY NONANTIBODY: CPT

## 2023-07-12 PROCEDURE — 84145 PROCALCITONIN (PCT): CPT

## 2023-07-12 PROCEDURE — 87186 SC STD MICRODIL/AGAR DIL: CPT

## 2023-07-12 PROCEDURE — 83935 ASSAY OF URINE OSMOLALITY: CPT

## 2023-07-12 PROCEDURE — 87116 MYCOBACTERIA CULTURE: CPT

## 2023-07-12 RX ORDER — PANTOPRAZOLE SODIUM 20 MG/1
1 TABLET, DELAYED RELEASE ORAL
Qty: 30 | Refills: 0
Start: 2023-07-12 | End: 2023-08-10

## 2023-07-12 RX ORDER — SENNA PLUS 8.6 MG/1
2 TABLET ORAL
Qty: 28 | Refills: 0
Start: 2023-07-12 | End: 2023-07-25

## 2023-07-12 RX ORDER — CLONAZEPAM 1 MG
1 TABLET ORAL
Qty: 10 | Refills: 0
Start: 2023-07-12 | End: 2023-07-16

## 2023-07-12 RX ORDER — CLONAZEPAM 1 MG
1 TABLET ORAL
Refills: 0 | DISCHARGE

## 2023-07-12 RX ORDER — POTASSIUM CHLORIDE 20 MEQ
1 PACKET (EA) ORAL
Qty: 7 | Refills: 0
Start: 2023-07-12 | End: 2023-07-18

## 2023-07-12 RX ORDER — ATORVASTATIN CALCIUM 80 MG/1
1 TABLET, FILM COATED ORAL
Refills: 0 | DISCHARGE

## 2023-07-12 RX ORDER — QUETIAPINE FUMARATE 200 MG/1
1 TABLET, FILM COATED ORAL
Refills: 0 | DISCHARGE

## 2023-07-12 RX ORDER — AMLODIPINE BESYLATE 2.5 MG/1
1 TABLET ORAL
Refills: 0 | DISCHARGE

## 2023-07-12 RX ORDER — FUROSEMIDE 40 MG
1 TABLET ORAL
Qty: 7 | Refills: 0
Start: 2023-07-12 | End: 2023-07-18

## 2023-07-12 RX ORDER — ASPIRIN/CALCIUM CARB/MAGNESIUM 324 MG
1 TABLET ORAL
Refills: 0 | DISCHARGE

## 2023-07-12 RX ORDER — QUETIAPINE FUMARATE 200 MG/1
1 TABLET, FILM COATED ORAL
Qty: 30 | Refills: 0
Start: 2023-07-12 | End: 2023-08-10

## 2023-07-12 RX ORDER — POTASSIUM CHLORIDE 20 MEQ
40 PACKET (EA) ORAL ONCE
Refills: 0 | Status: COMPLETED | OUTPATIENT
Start: 2023-07-12 | End: 2023-07-12

## 2023-07-12 RX ORDER — POLYETHYLENE GLYCOL 3350 17 G/17G
17 POWDER, FOR SOLUTION ORAL
Qty: 85 | Refills: 0
Start: 2023-07-12 | End: 2023-07-16

## 2023-07-12 RX ORDER — CLOPIDOGREL BISULFATE 75 MG/1
1 TABLET, FILM COATED ORAL
Qty: 30 | Refills: 0
Start: 2023-07-12 | End: 2023-08-10

## 2023-07-12 RX ORDER — PANTOPRAZOLE SODIUM 20 MG/1
1 TABLET, DELAYED RELEASE ORAL
Refills: 0 | DISCHARGE

## 2023-07-12 RX ORDER — DILTIAZEM HCL 120 MG
1 CAPSULE, EXT RELEASE 24 HR ORAL
Qty: 30 | Refills: 0
Start: 2023-07-12 | End: 2023-08-10

## 2023-07-12 RX ORDER — ASPIRIN/CALCIUM CARB/MAGNESIUM 324 MG
1 TABLET ORAL
Qty: 30 | Refills: 0
Start: 2023-07-12 | End: 2023-08-10

## 2023-07-12 RX ORDER — ATORVASTATIN CALCIUM 80 MG/1
1 TABLET, FILM COATED ORAL
Qty: 30 | Refills: 0
Start: 2023-07-12 | End: 2023-08-10

## 2023-07-12 RX ADMIN — CLOPIDOGREL BISULFATE 75 MILLIGRAM(S): 75 TABLET, FILM COATED ORAL at 10:41

## 2023-07-12 RX ADMIN — Medication 0.5 MILLIGRAM(S): at 05:49

## 2023-07-12 RX ADMIN — Medication 81 MILLIGRAM(S): at 05:49

## 2023-07-12 RX ADMIN — Medication 120 MILLIGRAM(S): at 05:49

## 2023-07-12 RX ADMIN — ACYCLOVIR 1 APPLICATION(S): 50 OINTMENT TOPICAL at 05:51

## 2023-07-12 RX ADMIN — Medication 20 MILLIEQUIVALENT(S): at 10:43

## 2023-07-12 RX ADMIN — Medication 40 MILLIEQUIVALENT(S): at 08:38

## 2023-07-12 RX ADMIN — PIPERACILLIN AND TAZOBACTAM 25 GRAM(S): 4; .5 INJECTION, POWDER, LYOPHILIZED, FOR SOLUTION INTRAVENOUS at 05:50

## 2023-07-12 RX ADMIN — Medication 40 MILLIGRAM(S): at 05:49

## 2023-07-12 RX ADMIN — BUDESONIDE AND FORMOTEROL FUMARATE DIHYDRATE 2 PUFF(S): 160; 4.5 AEROSOL RESPIRATORY (INHALATION) at 06:52

## 2023-07-12 RX ADMIN — Medication 3 MILLILITER(S): at 05:50

## 2023-07-12 RX ADMIN — VALACYCLOVIR 1000 MILLIGRAM(S): 500 TABLET, FILM COATED ORAL at 05:50

## 2023-07-12 RX ADMIN — Medication 3 MILLILITER(S): at 10:43

## 2023-07-12 RX ADMIN — PANTOPRAZOLE SODIUM 40 MILLIGRAM(S): 20 TABLET, DELAYED RELEASE ORAL at 10:43

## 2023-07-12 RX ADMIN — Medication 1 APPLICATION(S): at 05:51

## 2023-07-12 RX ADMIN — Medication 7.5 MILLIGRAM(S): at 05:49

## 2023-07-12 NOTE — DISCHARGE NOTE PROVIDER - NSDCCPTREATMENT_GEN_ALL_CORE_FT
PRINCIPAL PROCEDURE  Procedure: Occlusion, left atrial appendage  Findings and Treatment: Left atrial appendage occlusion with watchmen device

## 2023-07-12 NOTE — DISCHARGE NOTE PROVIDER - HOSPITAL COURSE
71 y/o F, Cantonese speaking, PMHx significant for HTN, HLD, CAD (hx of CABG in 2018), aortic valve disease (hx of bioprosthetic AVR in 2018), hx of AV endocarditis, AF, PVD, and COPD/bronchiectasis. Pt with worsening hemoptysis on warfarin therapy and decision made to proceed with surgical intervention of appendage closure. Underwent NOEMÍ closure with Watchman device 6/28/23 with Dr. Cervantes. POD1 noted to have worsening cough and hemoptysis. ENT consulted and patient had bedside scope that revealed no active bleeding source. Pulm consulted and pt transferred to MICU POD2, intubated and bronch showed RLL bleed and given tranexamic acid. Repeat bronch POD3 negative or active bleeding. BAL + pseudomonas, started on Zosyn therapy. Unable to tolerate PO post extubation and NGT placed per SLP, and TF started. POD6 episodes of rapid AF, transferred back to [facility] for management. EP consulted and pt given digoxin and cardizem with improvement rate. POD9 pt tolerated soft diet.  POD12 reports heart block with rate in 40s, TVP placed transiently. POD12, TVP removed, remained stable. Toady POD13, patient feels well, ambulating on RA, tolerating PO Diet, + BM. Per EP patient stable for discharge with MCOT on Cardizem. Per Dr. Cervantes, patient ready for discharge.     Physical Exam  CONSTITUTIONAL:      Sitting comfortably in bed, NAD  NEURO:  AAOx3, no neuro deficits, CN grossly intact                   EYES:    WNL  ENMT:           WNL  CV:    S1S2, RRR, no mursmurs appreciated   RESPIRATORY:   CTA b/l, no w/r/r  GI: +BS, soft, nd/nd  : No smith, deferred  MUSKULOSKELETAL:   WWP, no edema, no calf tenderness, palpable peripheral pulses b/l   SKIN / BREAST:   groin soft, no hematoma.

## 2023-07-12 NOTE — PROGRESS NOTE ADULT - SUBJECTIVE AND OBJECTIVE BOX
GERMÁN GUPTA , [id number],  [facility] [address]LA [address] 01    encounter time 8:30 am.     core sore crusted on both side,   she is happy with the topical cream - to continue on discharge  saturating well on room air at rest 94 %  remain in sinus heart rate stable in 70-80     T(C): 36.3 (07-12-23 @ 09:28), Max: 36.6 (07-11-23 @ 13:51)  HR: 77 (07-12-23 @ 09:45) (75 - 98)  BP: 105/56 (07-12-23 @ 09:45) (98/55 - 117/57)  RR: 16 (07-12-23 @ 08:44) (16 - 16)  SpO2: 94% (07-12-23 @ 08:44) (93% - 96%)    acetaminophen   Oral Liquid .. 650 milliGRAM(s) Oral every 6 hours PRN  acyclovir Topical 5% Ointment 1 Application(s) Topical two times a day  albuterol/ipratropium for Nebulization 3 milliLiter(s) Nebulizer every 6 hours  aspirin  chewable 81 milliGRAM(s) Oral every 24 hours  atorvastatin 20 milliGRAM(s) Oral at bedtime  benzocaine/menthol Lozenge 1 Lozenge Oral every 4 hours PRN  bisacodyl 5 milliGRAM(s) Oral every 12 hours PRN  budesonide  80 MICROgram(s)/formoterol 4.5 MICROgram(s) Inhaler 2 Puff(s) Inhalation two times a day  busPIRone 7.5 milliGRAM(s) Oral <User Schedule>  clonazePAM  Tablet 0.5 milliGRAM(s) Oral two times a day  clopidogrel Tablet 75 milliGRAM(s) Oral daily  diltiazem    milliGRAM(s) Oral daily  furosemide    Tablet 40 milliGRAM(s) Oral daily  hydrocortisone 1% Cream 1 Application(s) Topical every 12 hours  pantoprazole   Suspension 40 milliGRAM(s) Oral daily  piperacillin/tazobactam IVPB.. 4.5 Gram(s) IV Intermittent every 8 hours  polyethylene glycol 3350 17 Gram(s) Oral daily  potassium chloride   Powder 20 milliEquivalent(s) Oral daily  QUEtiapine 25 milliGRAM(s) Oral daily  senna 2 Tablet(s) Oral at bedtime  valACYclovir 1000 milliGRAM(s) Oral every 12 hours      Physical Exam :    General exam :  saturating well on room air.   RRR with systolic murmur  good air entry bilaterally  abd- bs present, soft, nt, nd.  ext- no edema no tenderness  neuro -non focal  core sore on lips crusted.     CBC Full  -  ( 12 Jul 2023 05:30 )  WBC Count : 7.26 K/uL  RBC Count : 3.41 M/uL  Hemoglobin : 10.3 g/dL  Hematocrit : 32.0 %  Platelet Count - Automated : 414 K/uL  Mean Cell Volume : 93.8 fl  Mean Cell Hemoglobin : 30.2 pg  Mean Cell Hemoglobin Concentration : 32.2 gm/dL  Auto Neutrophil # : x  Auto Lymphocyte # : x  Auto Monocyte # : x  Auto Eosinophil # : x  Auto Basophil # : x  Auto Neutrophil % : x  Auto Lymphocyte % : x  Auto Monocyte % : x  Auto Eosinophil % : x  Auto Basophil % : x        07-12    140  |  103  |  19  ----------------------------<  128<H>  3.5   |  28  |  0.76    Ca    8.7      12 Jul 2023 05:30  Mg     2.2     07-12      Daily     Daily   CAPILLARY BLOOD GLUCOSE          Urinalysis Basic - ( 12 Jul 2023 05:30 )    Color: x / Appearance: x / SG: x / pH: x  Gluc: 128 mg/dL / Ketone: x  / Bili: x / Urobili: x   Blood: x / Protein: x / Nitrite: x   Leuk Esterase: x / RBC: x / WBC x   Sq Epi: x / Non Sq Epi: x / Bacteria: x

## 2023-07-12 NOTE — DISCHARGE NOTE PROVIDER - NSDCMRMEDTOKEN_GEN_ALL_CORE_FT
Albuterol (Eqv-ProAir HFA) 90 mcg/inh inhalation aerosol: 2 inhaled 4 times a day as needed for  shortness of breath and/or wheezing  amLODIPine 2.5 mg oral tablet: 1 orally  aspirin 81 mg oral tablet: 1 orally once a day  breo ellipta: 100-25 mcg/INH aepb  busPIRone 5 mg oral tablet: 1.5 tab(s) orally  clonazePAM 0.5 mg oral tablet: 1 orally  Lipitor 20 mg oral tablet: 1 orally once a day  pantoprazole 40 mg oral delayed release tablet: 1 orally once a day  Seroquel 25 mg oral tablet: 1 orally   Albuterol (Eqv-ProAir HFA) 90 mcg/inh inhalation aerosol: 2 inhaled 4 times a day as needed for  shortness of breath and/or wheezing  aspirin 81 mg oral tablet, chewable: 1 tab(s) orally every 24 hours  atorvastatin 20 mg oral tablet: 1 tab(s) orally once a day (at bedtime)  breo ellipta: 100-25 mcg/INH aepb  busPIRone 7.5 mg oral tablet: 1 tab(s) orally once a day  clopidogrel 75 mg oral tablet: 1 tab(s) orally once a day  dilTIAZem 120 mg/24 hours oral capsule, extended release: 1 cap(s) orally once a day  Lasix 20 mg oral tablet: 1 tab(s) orally  pantoprazole 40 mg oral delayed release tablet: 1 tab(s) orally once a day  polyethylene glycol 3350 oral powder for reconstitution: 17 gram(s) orally once a day  Potassium Chloride (Eqv-Klor-Con 10) 10 mEq oral tablet, extended release: 1 tab(s) orally  QUEtiapine 25 mg oral tablet: 1 tab(s) orally once a day  senna leaf extract oral tablet: 2 tab(s) orally once a day (at bedtime) as needed for  constipation

## 2023-07-12 NOTE — PROGRESS NOTE ADULT - ASSESSMENT
71yo Cantonese-speaking F PMH HTN, HLD, CAD s/p bioAVR/CABG (11/2018 SVG-RCA, Catalan Inspiris 21mm), endocarditis ( no longer on lifetime PCN after bioAVR in 2018), AF on warfarin, AAA, PVD, COPD/ bronchiectasis ( on Breo Ellipita and Proair) ? hx MAC, with worsening cough and hemoptysis on warfarin, s/p watchman device placement ( 6/28), postop Day 1 (6/29)c/b worsening cough with hemoptysis, ENT scope showing no bleeding source, and CTA Chest did not reveal source of bleed, brought to MICU ( 6/29) intubated/paralyzed ( 6/29) for bronch with findings of blood-filled R lung, IR unable to embolize. Pt was given Tranexamic acid nebs, repeat Bronch ( 7/1) showed no active bleeding seen, frail tissues/inflammation, BAL with Pseudomonas and started on Zosyn ( 6/20 @ 2200), clinically improving and extubated on ( 7/2 am). Pt failed SLP eval ( 7/3), keep NPO/NGT feed. Pt deemed medically stable and transferred to [address] ( 7/3), failed TOV with smith cath placement ( 7/3).     # COPD  # Bronchiectasis exacerbation ( Pseudomonas)   # Massive hemoptysis -resolved.  # Acute hypoxic respiratory failure   # Dysphagia  # CAD s/p CABG   # BioAVR   # Hx chronic Endocarditis ( on lifelong PCN)   # AFib s/p Watchman device ( 6/28)   # HTN  # HLD  # Depression/Anxiety  # New onset Atrial Flutter w. variable blocks( 7/4)   # Oral Herpes( s/p Valtrex 7/9)     - Pt was transferred out of MICU to [address] (7/3)  - Transferred to [facility] (7/4) under structural heart specialist, Dr. Moose Johnson   - the new onset Atrial Flutter -was given dig/diltiazem --> ally/converted to sinus - was placed TVP briefly, now removed-currently on Diltiazem 120 mg po q daily   heart rate stable, no need for PPM  - EP f/u appreciated, Dr. Figueroa-  - cw diuretics lasix 40 mg po q daily.  - now off O2 with SpO2 96% RA, keep K>4 and Mg>2     - c/w DAPT: ASA 81mg po daily and Clopidogrel 75mg po daily for at least 45 days and plan for repeat CT  - HLD: Atorvastatin 20mg po qHS   - Pulm f/u appreciated     - Collateral obtained from primary Pulm Dr. 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 4.5 Gram(s) IV Intermittent every 8 hours ( 6/30 @ 2200) , total course 14 days, plan on transitioning to Levaquine po when ready for d/c   - Oral Herpes: getting Valtrex 1000 bid ( started 7/10- ) to stop after today.   - add oral acyclor cream ( topical application )   - BCx: NGTD  - ICS/LABA: budesonide  80 MICROgram(s)/formoterol 4.5 MICROgram(s) Inhaler 2 Puff(s) Inhalation two times a day  ( uses Albuterol 90mcg/inh 2 puffs q4h PRN and Breo Ellipta 100/25 mcg/INH at home)   - STEWART/ROSSY: albuterol/ipratropium for Nebulization 3 milliLiter(s) Nebulizer every 6 hours ( Proair prn at home)   - add lozenges for throat pain   - s/p Extubation (7/2)  - SLP eval appreciated, NGT removed ( 7/7)    - pt tolerating regular diet ( 7/7)   - hx Anxiety:        clonazePAM  Tablet 0.5 milliGRAM(s) Oral two times a day       busPIRone 7.5 milliGRAM(s) Oral q HS <User Schedule>       QUEtiapine 25 milliGRAM(s) Oral daily     - PT eval please ( OOBTC as tolerated)    GI ppx: protonix 40mg  DVT ppx: holding pharmacologic ppx for now given hemoptysis; SCDs  Code status: FULL CODE      PMD: Dr.Helang Philippe Marvin ( [facility]) [phone]  Pulm; Dr. Efren Wise [phone]  [facility] Cardiology: Dr. Mitul Gonzalez/ Dr. Nubia Minor [phone]  [facility] [employer] [phone] ( [address] [place] [address])   Family contact: Joseph Vazquez (son) [phone]    POC as d/w 9 [place] team, sheila Vazquez  pt is medically stable for discharge home - to have follow up with Dr. Mitul Gonzalez ( cardiology ) Pulm Dr. Wise. and PMD.  questions from son answered.        73yo Cantonese-speaking F PMH HTN, HLD, CAD s/p bioAVR/CABG (11/2018 SVG-RCA, Catalan Inspiris 21mm), endocarditis ( no longer on lifetime PCN after bioAVR in 2018), AF on warfarin, AAA, PVD, COPD/ bronchiectasis ( on Breo Ellipita and Proair) ? hx MAC, with worsening cough and hemoptysis on warfarin, s/p watchman device placement ( 6/28), postop Day 1 (6/29)c/b worsening cough with hemoptysis, ENT scope showing no bleeding source, and CTA Chest did not reveal source of bleed, brought to MICU ( 6/29) intubated/paralyzed ( 6/29) for bronch with findings of blood-filled R lung, IR unable to embolize. Pt was given Tranexamic acid nebs, repeat Bronch ( 7/1) showed no active bleeding seen, frail tissues/inflammation, BAL with Pseudomonas and started on Zosyn ( 6/30 @ 2200), clinically improving and extubated on ( 7/2 am). Pt failed SLP eval ( 7/3), keep NPO/NGT feed. Pt deemed medically stable and transferred to [address] ( 7/3), failed TOV with smith cath placement ( 7/3).     # COPD  # Bronchiectasis exacerbation ( Pseudomonas)   # Massive hemoptysis -resolved.  # Acute hypoxic respiratory failure   # Dysphagia  # CAD s/p CABG   # BioAVR   # Hx chronic Endocarditis ( on lifelong PCN)   # AFib s/p Watchman device ( 6/28)   # HTN  # HLD  # Depression/Anxiety  # New onset Atrial Flutter w. variable blocks( 7/4)   # Oral Herpes( s/p Valtrex 7/9)     - Pt was transferred out of MICU to [address] (7/3)  - Transferred to [facility] (7/4) under structural heart specialist, Dr. Moose Johnson   - the new onset Atrial Flutter -was given dig/diltiazem --> ally/converted to sinus - was placed TVP briefly, now removed-currently on Diltiazem 120 mg po q daily   heart rate stable, no need for PPM  - EP f/u appreciated, Dr. Figueroa-  - cw diuretics lasix 40 mg po q daily.  - now off O2 with SpO2 96% RA, keep K>4 and Mg>2     - c/w DAPT: ASA 81mg po daily and Clopidogrel 75mg po daily for at least 45 days and plan for repeat CT  - HLD: Atorvastatin 20mg po qHS   - Pulm f/u appreciated     - Collateral obtained from primary Pulm Dr. 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 4.5 Gram(s) IV Intermittent every 8 hours ( 6/30 @ 2200) , total course 14 days, plan on transitioning to Levaquine po when ready for d/c   - Oral Herpes: getting Valtrex 1000 bid ( started 7/10- ) to stop after today.   - add oral acyclor cream ( topical application )   - BCx: NGTD  - ICS/LABA: budesonide  80 MICROgram(s)/formoterol 4.5 MICROgram(s) Inhaler 2 Puff(s) Inhalation two times a day  ( uses Albuterol 90mcg/inh 2 puffs q4h PRN and Breo Ellipta 100/25 mcg/INH at home)   - STEWART/ROSSY: albuterol/ipratropium for Nebulization 3 milliLiter(s) Nebulizer every 6 hours ( Proair prn at home)   - add lozenges for throat pain   - s/p Extubation (7/2)  - SLP eval appreciated, NGT removed ( 7/7)    - pt tolerating regular diet ( 7/7)   - hx Anxiety:        clonazePAM  Tablet 0.5 milliGRAM(s) Oral two times a day       busPIRone 7.5 milliGRAM(s) Oral q HS <User Schedule>       QUEtiapine 25 milliGRAM(s) Oral daily     - PT eval please ( OOBTC as tolerated)    GI ppx: protonix 40mg  DVT ppx: holding pharmacologic ppx for now given hemoptysis; SCDs  Code status: FULL CODE      PMD: Dr.Helang Philippe Marvin ( [facility]) [phone]  Pulm; Dr. Efren Wise [phone]  [facility] Cardiology: Dr. Mitul Gonzalez/ Dr. Nubia Minor [phone]  [facility] [employer] [phone] ( [address] [place] [address])   Family contact: Joseph Vazquez (son) [phone]    POC as d/w 9 [place] team, sheila Vazquez  pt is medically stable for discharge home - to have follow up with Dr. Mitul Gonzalez ( cardiology ) Pulm Dr. Wise. and PMD.  questions from son answered.        73yo Cantonese-speaking F PMH HTN, HLD, CAD s/p bioAVR/CABG (11/2018 SVG-RCA, Catalan Inspiris 21mm), endocarditis ( no longer on lifetime PCN after bioAVR in 2018), AF on warfarin, AAA, PVD, COPD/ bronchiectasis ( on Breo Ellipita and Proair) ? hx MAC, with worsening cough and hemoptysis on warfarin, s/p watchman device placement ( 6/28), postop Day 1 (6/29)c/b worsening cough with hemoptysis, ENT scope showing no bleeding source, and CTA Chest did not reveal source of bleed, brought to MICU ( 6/29) intubated/paralyzed ( 6/29) for bronch with findings of blood-filled R lung, IR unable to embolize. Pt was given Tranexamic acid nebs, repeat Bronch ( 7/1) showed no active bleeding seen, frail tissues/inflammation, BAL with Pseudomonas and started on Zosyn ( 6/30 @ 2200), clinically improving and extubated on ( 7/2 am). Pt failed SLP eval ( 7/3), keep NPO/NGT feed. Pt deemed medically stable and transferred to [address] ( 7/3), failed TOV with smith cath placement ( 7/3). -- now tolerating diet well, ambulatory and voiding well- A/flutter given dig/diltiazem- converted back to sinus -now on diltiazem 120 mg, saturating well on room air at rest -94 % 7/12-     # COPD  # Bronchiectasis exacerbation ( Pseudomonas)   # Massive hemoptysis -resolved.  # Acute hypoxic respiratory failure   # Dysphagia  # CAD s/p CABG   # BioAVR   # Hx chronic Endocarditis ( on lifelong PCN)   # AFib s/p Watchman device ( 6/28)   # HTN  # HLD  # Depression/Anxiety  # New onset Atrial Flutter w. variable blocks( 7/4)   # Oral Herpes( s/p Valtrex 7/9)     - Pt was transferred out of MICU to [address] (7/3)  - Transferred to [facility] (7/4) under structural heart specialist, Dr. Moose Johnson   - the new onset Atrial Flutter -was given dig/diltiazem --> ally/converted to sinus - was placed TVP briefly, now removed-currently on Diltiazem 120 mg po q daily   heart rate stable, no need for PPM  - EP f/u appreciated, Dr. Figueroa-  - cw diuretics lasix 40 mg po q daily--> 20 mg po q daily .  - now off O2 with SpO2 96% RA, keep K>4 and Mg>2 at rest      - c/w DAPT: ASA 81mg po daily and Clopidogrel 75mg po daily for at least 45 days and plan for repeat CT  - HLD: Atorvastatin 20mg po qHS   - Pulm f/u appreciated     - Collateral obtained from primary Pulm Dr. 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 4.5 Gram(s) IV Intermittent every 8 hours ( 6/30 @ 2200) , total course 14 days, plan on transitioning to Levaquine po when ready for d/c   - Oral Herpes: getting Valtrex 1000 bid ( started 7/10- ) to stop after today.   - add oral acyclor cream ( topical application )   - BCx: NGTD  - ICS/LABA: budesonide  80 MICROgram(s)/formoterol 4.5 MICROgram(s) Inhaler 2 Puff(s) Inhalation two times a day  ( uses Albuterol 90mcg/inh 2 puffs q4h PRN and Breo Ellipta 100/25 mcg/INH at home)   - STEWART/ROSSY: albuterol/ipratropium for Nebulization 3 milliLiter(s) Nebulizer every 6 hours ( Proair prn at home)   - add lozenges for throat pain   - s/p Extubation (7/2)  - SLP eval appreciated, NGT removed ( 7/7)    - pt tolerating regular diet ( 7/7)   - hx Anxiety:        clonazePAM  Tablet 0.5 milliGRAM(s) Oral two times a day       busPIRone 7.5 milliGRAM(s) Oral q HS <User Schedule>       QUEtiapine 25 milliGRAM(s) Oral daily     - PT eval please ( OOBTC as tolerated)    GI ppx: protonix 40mg  DVT ppx: holding pharmacologic ppx for now given hemoptysis; SCDs  Code status: FULL CODE      PMD: Dr.Helang Philippe Marvin ( [facility]) [phone]  Pulm; Dr. Efren Wise [phone]  [facility] Cardiology: Dr. Mitul Gonzalez/ Dr. Nubia Minor [phone]  [facility] [employer] [facility] [phone] ( [address] [place] [address])   Family contact: Joseph Vazquez (son) [phone]    POC as d/w 9 [place] team, sheila Vazquez  pt is medically stable for discharge home - to have follow up with Dr. Mitul Gonzalez ( cardiology ) Pulm Dr. Wise ( verbal hand off given ) . and PMD.  questions from son answered.

## 2023-07-12 NOTE — PROGRESS NOTE ADULT - PROVIDER SPECIALTY LIST ADULT
Electrophysiology
Electrophysiology
Hospitalist
Internal Medicine
Internal Medicine
MICU
MICU
Pulmonology
Pulmonology
Structural Heart
CT Surgery
Critical Care
Hospitalist
Hospitalist
Internal Medicine
MICU
Pulmonology
Structural Heart
Electrophysiology
Hospitalist
Hospitalist
Internal Medicine
Internal Medicine
Pulmonology
Structural Heart
Pulmonology

## 2023-07-12 NOTE — DISCHARGE NOTE PROVIDER - CARE PROVIDER_API CALL
Moose Cervantes  Interventional Cardiology  [address] [place] [address]  Phone: [phone]  Fax: [phone]  Follow Up Time:     Mitul Gonzalez  Cardiology  [address] [facility] [address]  [place] [address]  Phone: ()-  Fax: ()-  Follow Up Time:

## 2023-07-22 DIAGNOSIS — A41.9 SEPSIS, UNSPECIFIED ORGANISM: ICD-10-CM

## 2023-07-22 DIAGNOSIS — J47.1 BRONCHIECTASIS WITH (ACUTE) EXACERBATION: ICD-10-CM

## 2023-07-22 DIAGNOSIS — J96.01 ACUTE RESPIRATORY FAILURE WITH HYPOXIA: ICD-10-CM

## 2023-07-22 DIAGNOSIS — I10 ESSENTIAL (PRIMARY) HYPERTENSION: ICD-10-CM

## 2023-07-22 DIAGNOSIS — R79.1 ABNORMAL COAGULATION PROFILE: ICD-10-CM

## 2023-07-22 DIAGNOSIS — R04.2 HEMOPTYSIS: ICD-10-CM

## 2023-07-22 DIAGNOSIS — R57.8 OTHER SHOCK: ICD-10-CM

## 2023-07-22 DIAGNOSIS — B96.5 PSEUDOMONAS (AERUGINOSA) (MALLEI) (PSEUDOMALLEI) AS THE CAUSE OF DISEASES CLASSIFIED ELSEWHERE: ICD-10-CM

## 2023-07-22 DIAGNOSIS — J98.11 ATELECTASIS: ICD-10-CM

## 2023-07-22 DIAGNOSIS — I44.30 UNSPECIFIED ATRIOVENTRICULAR BLOCK: ICD-10-CM

## 2023-07-22 DIAGNOSIS — Z79.82 LONG TERM (CURRENT) USE OF ASPIRIN: ICD-10-CM

## 2023-07-22 DIAGNOSIS — I71.40 ABDOMINAL AORTIC ANEURYSM, WITHOUT RUPTURE, UNSPECIFIED: ICD-10-CM

## 2023-07-22 DIAGNOSIS — F05 DELIRIUM DUE TO KNOWN PHYSIOLOGICAL CONDITION: ICD-10-CM

## 2023-07-22 DIAGNOSIS — Z95.3 PRESENCE OF XENOGENIC HEART VALVE: ICD-10-CM

## 2023-07-22 DIAGNOSIS — B00.9 HERPESVIRAL INFECTION, UNSPECIFIED: ICD-10-CM

## 2023-07-22 DIAGNOSIS — I73.9 PERIPHERAL VASCULAR DISEASE, UNSPECIFIED: ICD-10-CM

## 2023-07-22 DIAGNOSIS — T45.515A ADVERSE EFFECT OF ANTICOAGULANTS, INITIAL ENCOUNTER: ICD-10-CM

## 2023-07-22 DIAGNOSIS — R13.10 DYSPHAGIA, UNSPECIFIED: ICD-10-CM

## 2023-07-22 DIAGNOSIS — F41.9 ANXIETY DISORDER, UNSPECIFIED: ICD-10-CM

## 2023-07-22 DIAGNOSIS — I95.81 POSTPROCEDURAL HYPOTENSION: ICD-10-CM

## 2023-07-22 DIAGNOSIS — Z79.2 LONG TERM (CURRENT) USE OF ANTIBIOTICS: ICD-10-CM

## 2023-07-22 DIAGNOSIS — I49.5 SICK SINUS SYNDROME: ICD-10-CM

## 2023-07-22 DIAGNOSIS — E78.5 HYPERLIPIDEMIA, UNSPECIFIED: ICD-10-CM

## 2023-07-22 DIAGNOSIS — R04.89 HEMORRHAGE FROM OTHER SITES IN RESPIRATORY PASSAGES: ICD-10-CM

## 2023-07-22 DIAGNOSIS — Z78.1 PHYSICAL RESTRAINT STATUS: ICD-10-CM

## 2023-07-22 DIAGNOSIS — Z79.51 LONG TERM (CURRENT) USE OF INHALED STEROIDS: ICD-10-CM

## 2023-07-22 DIAGNOSIS — I48.11 LONGSTANDING PERSISTENT ATRIAL FIBRILLATION: ICD-10-CM

## 2023-07-22 DIAGNOSIS — Z91.013 ALLERGY TO SEAFOOD: ICD-10-CM

## 2023-07-22 DIAGNOSIS — G92.8 OTHER TOXIC ENCEPHALOPATHY: ICD-10-CM

## 2023-07-22 DIAGNOSIS — I25.10 ATHEROSCLEROTIC HEART DISEASE OF NATIVE CORONARY ARTERY WITHOUT ANGINA PECTORIS: ICD-10-CM

## 2023-07-22 DIAGNOSIS — F32.A DEPRESSION, UNSPECIFIED: ICD-10-CM

## 2023-07-22 DIAGNOSIS — Z86.19 PERSONAL HISTORY OF OTHER INFECTIOUS AND PARASITIC DISEASES: ICD-10-CM

## 2023-07-22 DIAGNOSIS — T41.205A ADVERSE EFFECT OF UNSPECIFIED GENERAL ANESTHETICS, INITIAL ENCOUNTER: ICD-10-CM

## 2023-07-22 DIAGNOSIS — I39 ENDOCARDITIS AND HEART VALVE DISORDERS IN DISEASES CLASSIFIED ELSEWHERE: ICD-10-CM

## 2023-07-22 DIAGNOSIS — E87.29 OTHER ACIDOSIS: ICD-10-CM

## 2023-07-22 DIAGNOSIS — R57.1 HYPOVOLEMIC SHOCK: ICD-10-CM

## 2023-07-22 DIAGNOSIS — Z79.01 LONG TERM (CURRENT) USE OF ANTICOAGULANTS: ICD-10-CM

## 2023-07-22 DIAGNOSIS — Z88.2 ALLERGY STATUS TO SULFONAMIDES: ICD-10-CM

## 2023-07-22 DIAGNOSIS — R00.1 BRADYCARDIA, UNSPECIFIED: ICD-10-CM

## 2023-07-22 DIAGNOSIS — I48.92 UNSPECIFIED ATRIAL FLUTTER: ICD-10-CM

## 2023-07-22 DIAGNOSIS — Z95.1 PRESENCE OF AORTOCORONARY BYPASS GRAFT: ICD-10-CM

## 2023-07-22 DIAGNOSIS — Z88.1 ALLERGY STATUS TO OTHER ANTIBIOTIC AGENTS STATUS: ICD-10-CM

## 2023-07-24 ENCOUNTER — APPOINTMENT (OUTPATIENT)
Dept: CARDIOTHORACIC SURGERY | Facility: CLINIC | Age: 73
End: 2023-07-24

## 2023-07-25 DIAGNOSIS — Z00.6 ENCOUNTER FOR EXAMINATION FOR NORMAL COMPARISON AND CONTROL IN CLINICAL RESEARCH PROGRAM: ICD-10-CM

## 2023-07-28 LAB — MI2 AB SER QL: NEGATIVE — SIGNIFICANT CHANGE UP

## 2023-07-29 LAB
CULTURE RESULTS: SIGNIFICANT CHANGE UP
CULTURE RESULTS: SIGNIFICANT CHANGE UP
SPECIMEN SOURCE: SIGNIFICANT CHANGE UP
SPECIMEN SOURCE: SIGNIFICANT CHANGE UP

## 2023-08-03 PROBLEM — I06.9: Chronic | Status: ACTIVE | Noted: 2023-06-27

## 2023-08-03 PROBLEM — E78.5 HYPERLIPIDEMIA, UNSPECIFIED: Chronic | Status: ACTIVE | Noted: 2023-06-27

## 2023-08-03 PROBLEM — Z87.09 PERSONAL HISTORY OF OTHER DISEASES OF THE RESPIRATORY SYSTEM: Chronic | Status: ACTIVE | Noted: 2023-06-27

## 2023-08-03 PROBLEM — I48.91 UNSPECIFIED ATRIAL FIBRILLATION: Chronic | Status: ACTIVE | Noted: 2023-06-27

## 2023-08-03 PROBLEM — I25.10 ATHEROSCLEROTIC HEART DISEASE OF NATIVE CORONARY ARTERY WITHOUT ANGINA PECTORIS: Chronic | Status: ACTIVE | Noted: 2023-06-27

## 2023-08-03 PROBLEM — Z86.79 PERSONAL HISTORY OF OTHER DISEASES OF THE CIRCULATORY SYSTEM: Chronic | Status: ACTIVE | Noted: 2023-06-27

## 2023-08-03 PROBLEM — Z87.898 PERSONAL HISTORY OF OTHER SPECIFIED CONDITIONS: Chronic | Status: ACTIVE | Noted: 2023-06-27

## 2023-08-16 ENCOUNTER — APPOINTMENT (OUTPATIENT)
Dept: CT IMAGING | Facility: HOSPITAL | Age: 73
End: 2023-08-16
Payer: MEDICARE

## 2023-08-16 ENCOUNTER — APPOINTMENT (OUTPATIENT)
Dept: CARDIOTHORACIC SURGERY | Facility: CLINIC | Age: 73
End: 2023-08-16
Payer: MEDICARE

## 2023-08-16 ENCOUNTER — OUTPATIENT (OUTPATIENT)
Dept: OUTPATIENT SERVICES | Facility: HOSPITAL | Age: 73
LOS: 1 days | End: 2023-08-16
Payer: MEDICARE

## 2023-08-16 ENCOUNTER — APPOINTMENT (OUTPATIENT)
Dept: CARDIOTHORACIC SURGERY | Facility: CLINIC | Age: 73
End: 2023-08-16

## 2023-08-16 DIAGNOSIS — Z41.9 ENCOUNTER FOR PROCEDURE FOR PURPOSES OTHER THAN REMEDYING HEALTH STATE, UNSPECIFIED: Chronic | ICD-10-CM

## 2023-08-16 LAB
CULTURE RESULTS: SIGNIFICANT CHANGE UP
POCT ISTAT CREATININE: 0.5 MG/DL — SIGNIFICANT CHANGE UP (ref 0.5–1.3)
SPECIMEN SOURCE: SIGNIFICANT CHANGE UP

## 2023-08-16 PROCEDURE — 75573 CT HRT C+ STRUX CGEN HRT DS: CPT

## 2023-08-16 PROCEDURE — 82565 ASSAY OF CREATININE: CPT

## 2023-08-16 PROCEDURE — 75573 CT HRT C+ STRUX CGEN HRT DS: CPT | Mod: 26,MH

## 2023-08-16 PROCEDURE — 99213 OFFICE O/P EST LOW 20 MIN: CPT

## 2023-08-16 NOTE — PHYSICAL EXAM
[Sclera] : the sclera and conjunctiva were normal [Neck Appearance] : the appearance of the neck was normal [Respiration, Rhythm And Depth] : normal respiratory rhythm and effort [Auscultation Breath Sounds / Voice Sounds] : lungs were clear to auscultation bilaterally [Apical Impulse] : the apical impulse was normal [Heart Sounds] : normal S1 and S2 [2+] : left 2+ [Fingers] :  capillary refill of the fingers was normal [Abdomen Soft] : soft [Abdomen Tenderness] : non-tender [Abnormal Walk] : normal gait [Musculoskeletal - Swelling] : no joint swelling seen [Skin Color & Pigmentation] : normal skin color and pigmentation [] : no rash [Skin Lesions] : no skin lesions [Cranial Nerves] : cranial nerves 2-12 were intact [Deep Tendon Reflexes (DTR)] : deep tendon reflexes were 2+ and symmetric [Oriented To Time, Place, And Person] : oriented to person, place, and time

## 2023-08-16 NOTE — HISTORY OF PRESENT ILLNESS
[FreeTextEntry1] : 71 y/o Female with PMHx of HTN, HLD, CAD s/p bioAVR/CABG with Dr. Mitul Ly (11/2018 SVG-RCA, Catalan Inspiris 21mm), hx of Endocarditis on lifelong penicillin, AFib, AAA, PVD,  left atrial appendage closure with watchman device on 06/28/2023 who presents for follow up after testing.   Cantonese speaking,  was used.   Patient underwent CT Heart LA protocol today. Reports no hemopytsis while coumadin is off. Denies CP, SOB, palpitations, orthopnea, LE edema.

## 2023-08-22 NOTE — PROGRESS NOTE ADULT - ASSESSMENT
71 y/o Female with PMHx of HTN, HLD, CAD s/p bioAVR/CABG with Dr. Mitul Ly (11/2018 SVG-RCA, Catalan Inspiris 21mm), hx of Endocarditis on lifelong penicillin, AFib, AAA, PVD, presented with bronchiectasis worsening cough with hemoptysis on warfarin which has been stopped, referred by Dr. Mitul Gonzalez to Dr. Nubia Minor for pLAAo evaluation. Cantonese speaking,  was used. Patient underwent CT Heart LA protocol today. Reports no hemopytsis while coumadin is off. Denies CP, SOB, palpitations, orthopnea, LE edema. Patient seen in same day holding area; Reports no changes to PMHx or medications since last seen by our team. Pt underwent watchman device placement 6/28. Immediately post op developed some bradycardia and hypotension which resolved after she emerged from anesthesia. Pt developed some hemoptysis post op, likely related to elevated PTT.     Plan:    Neurovascular:   -Pain well controlled with current regimen. PRN's: tylenol  -c/w buspar and seroquel    Cardiovascular:   -Hemodynamically stable.   -Monitor: BP, HR, tele  -hx afib    -c/w ASA/Plavix  -HLD    -c/w lipitor    Respiratory:   -Oxygenating well on room air  -Encourage continued use of IS 10x/hr and frequent ambulation  -CXR: WNL    GI:  -GI PPX: protonix  -PO Diet  -Bowel Regimen: miralax    Renal / :  -Continue to monitor renal function: BUN/Cr 18/.45  -Monitor I/O's daily     Endocrine:    -No hx of DM or thyroid dx    Hematologic:  -CBC: H/H- 11.8/36.1  -Coagulation Panel.    ID:  -CBC: WBC- 7.56  -Continue to observe for SIRS/Sepsis Syndrome.    Prophylaxis:  -DVT prophylaxis held for now given hemoptysis  -Continue with SCD's b/l while patient is at rest     Disposition:  -Discharge home once patient is medically ready   Ftsg Text: The defect edges were debeveled with a #15 scalpel blade.  Given the location of the defect, shape of the defect and the proximity to free margins a full thickness skin graft was deemed most appropriate.  Using a sterile surgical marker, the primary defect shape was transferred to the donor site. The area thus outlined was incised deep to adipose tissue with a #15 scalpel blade.  The harvested graft was then trimmed of adipose tissue until only dermis and epidermis was left.  The skin margins of the secondary defect were undermined to an appropriate distance in all directions utilizing iris scissors.  The secondary defect was closed with interrupted buried subcutaneous sutures.  The skin edges were then re-apposed with running  sutures.  The skin graft was then placed in the primary defect and oriented appropriately.

## 2024-03-13 NOTE — PATIENT PROFILE ADULT - NSPROPOAURINARYCATHETER_GEN_A_NUR
PAST SURGICAL HISTORY:  Artificial cardiac pacemaker 6/ 2009    H/O: hysterectomy due to fibroids    History of appendectomy     History of cholecystectomy     History of transcatheter aortic valve replacement (TAVR) 4/2019     no

## 2024-07-26 NOTE — OCCUPATIONAL THERAPY INITIAL EVALUATION ADULT - PRECAUTIONS/LIMITATIONS, REHAB EVAL
Patient advised of insurance approval to proceed with injections and is agreeable to scheduling. Patient scheduled for procedure, pre-procedure instructions reviewed. Patient prefers Local sedation. Reviewed sedation instructions including No Fasting & No  Required. Patient has no medications to hold prior to procedure. Patient verbalized understanding of instructions, no further needs at this time.        [facility]  PRE-PROCEDURE INSTRUCTIONS WITHOUT SEDATION    Procedure: Left L4/5 TLESI       Appointment Date: 08/05/2024  Check-In Time: 09:00 AM      Prior to the procedure:  Please update us prior to the procedure if you are experiencing any symptoms of infection such as cough, fever, chills, urinary symptoms, or have recently been prescribed antibiotics, have open wounds, have recently had surgery or dental procedures.    Day of Procedure:  **Drivers will be required for patients who receive prescriptions for Valium.    NO FASTING REQUIRED  Please bring your Insurance Card, Photo ID, List of Current Medications and Referral (if applicable) to your appointment.  Please park in the [facility] [employer] [facility] and follow the signs to the [facility].  Check in at [facility] ([address] [place] [address]) outpatient registration in the [facility].  Please note-No prescriptions will be written by Pain Clinic in OR on the day of procedure. If you require a refill of medications, please contact the office 48 hours prior to your procedure.  If you have an implanted Spinal Cord or Peripheral Nerve Stimulator: Please remember to turn device off for procedure.        Medication Hold:    Number of days you need to be off for the following medications:    Aggrenox 10 days   Agrylin (Anagrelide) 10 days  Brilinta (Ticagrelor) 7 days  Imbruvica (Ibrutinib) 3 days   Enbrel (Etanercept) 24 hours   Fragmin (Dalteparin) 24 hours   Pletal (Cilostazol) 7 days  Effient (Prasugrel) 7 days  Pradaxa 10  days  Trental 7 days  Eliquis (Apixaban) 3 days  Xarelto (Rivaroxaban) 3 days  Lovenox (Enoxaparin) 24 hours  Aspirin  Greater than 81mg but less than 325mg   5 days  325mg and greater                  7 days  Coumadin       5 days  Procedure may be cancelled if INR is elevated.   Excedrin (with aspirin) 7 days  Plavix (Clopidogrel)                            7 days    NSAIDs: 24 hours preferred      Ibuprofen (Motrin, Advil, Vicoprofen), Naproxen (Naprosyn, Aleve), Piroxcam (Feldene), Meloxicam (Mobic), Oxaprozin (Daypro), Diclofenac (Voltaren), Indomethacin (Indocin), Etodolac (Lodine), Nabumetone (Relafen), Celebrex (Celecoxib)           HERBAL SUPPLEMENTS  5 days preferred  Fish oil, krill oil, Omega-3, Vascepa, Vitamin E, Turmeric, Garlic                       Insurance Authorization:   Most insurances are now requiring a preauthorization for all procedures.  In the event that your insurance does not authorize your procedure within 48 hours of the scheduled date, your procedure will be cancelled and rescheduled to a later date.  Please contact your insurance carrier to determine what your financial responsibility will be for the procedure(s).      Cancellation/Rescheduling Appointment:   In the event you need to cancel or reschedule your appointment, you must notify the office 24 hours prior.    Post-procedure instructions:        Please schedule a follow up visit within 2 to 4 weeks after your last procedure date   Please call our office with any questions or concerns before or after your procedure at  [phone].  If you are a diabetic, please increase the frequency of your glucose monitoring after the procedure as this may cause a temporary increase in your blood sugar.  Contact your primary care physician if your blood sugar rises as you may require some medication adjustment.  It is normal to have increased pain at injection site for up to 3-5 days after procedure, you can use heat or ice (20 minutes on  20 minutes off) for comfort.    **To hear a recorded version of these instructions, please call [phone] and follow the prompts.  **Para escuchar las instrucciones en Español, por favor de llamar el pipe [phone] opción 4.     cardiac precautions/fall precautions

## 2025-03-14 NOTE — PROCEDURAL SAFETY CHECKLIST WITH OR WITHOUT SEDATION - NSPRESEDATION2FT_GEN_ALL_CORE
Anesthesia confirms case reviewed for anesthesia risk alert.
Anesthesia confirms case reviewed for anesthesia risk alert.
no
Anesthesia confirms case reviewed for anesthesia risk alert.

## (undated) DEVICE — WARMING BLANKET LOWER ADULT

## (undated) DEVICE — PACK COR LT HEART

## (undated) DEVICE — DRSG QUICKCLOT HEMOSTATIC 4X4 FOIL

## (undated) DEVICE — ELCTR REM POLYHESIVE ADULT PT RETURN 15FT

## (undated) DEVICE — SUT VICRYL 2-0 27" CT-1

## (undated) DEVICE — PACK HYBRID LHH

## (undated) DEVICE — DRAPE ULTRASOUND TRANSDUCER COVER

## (undated) DEVICE — SPIKE LRG BORE SHRT SPIKE W/2IN 1-WAY STOPCOCK ON

## (undated) DEVICE — DRAPE PROBE COVER 5" X 96"

## (undated) DEVICE — ELCTR BOVIE PENCIL HANDPIECE ROCKER SWITCH 15FT

## (undated) DEVICE — CUFF FINGER CLEARSIGHT SM

## (undated) DEVICE — ELCTR ZOLL DEFIBRILLATOR PAD NO REPLACEMENT

## (undated) DEVICE — TUBING EXTENSION HI PRESSURE FLEX 48"

## (undated) DEVICE — CUFF FINGER CLEARSIGHT LG